# Patient Record
Sex: FEMALE | Race: WHITE | NOT HISPANIC OR LATINO | Employment: UNEMPLOYED | URBAN - METROPOLITAN AREA
[De-identification: names, ages, dates, MRNs, and addresses within clinical notes are randomized per-mention and may not be internally consistent; named-entity substitution may affect disease eponyms.]

---

## 2017-01-09 RX ORDER — DIGOXIN 125 MCG
125 TABLET ORAL
Qty: 90 TABLET | Refills: 0 | Status: SHIPPED | OUTPATIENT
Start: 2017-01-09 | End: 2017-04-19 | Stop reason: SDUPTHER

## 2017-01-10 RX ORDER — DIGOXIN 125 MCG
TABLET ORAL
Qty: 90 TABLET | Refills: 0 | OUTPATIENT
Start: 2017-01-10

## 2017-04-17 DIAGNOSIS — I48.0 PAF (PAROXYSMAL ATRIAL FIBRILLATION) (HCC): Primary | ICD-10-CM

## 2017-04-18 RX ORDER — DIGOXIN 125 MCG
TABLET ORAL
Qty: 90 TABLET | Refills: 0 | OUTPATIENT
Start: 2017-04-18

## 2017-04-18 NOTE — TELEPHONE ENCOUNTER
[facility] called about refilling pt's digoxin. I told the pharmacist the reason is she hasn't been seen since 2015 and she needed labs. She is going to deny it and let the pt know.

## 2017-04-19 RX ORDER — DIGOXIN 125 MCG
125 TABLET ORAL
Qty: 7 TABLET | Refills: 0 | Status: SHIPPED | OUTPATIENT
Start: 2017-04-19 | End: 2017-04-27

## 2017-04-27 ENCOUNTER — OFFICE VISIT (OUTPATIENT)
Dept: CARDIOLOGY | Facility: CLINIC | Age: 62
End: 2017-04-27

## 2017-04-27 VITALS
BODY MASS INDEX: 25.43 KG/M2 | DIASTOLIC BLOOD PRESSURE: 70 MMHG | HEART RATE: 62 BPM | WEIGHT: 162 LBS | HEIGHT: 67 IN | SYSTOLIC BLOOD PRESSURE: 120 MMHG

## 2017-04-27 DIAGNOSIS — I25.2 HISTORY OF MYOCARDIAL INFARCTION IN ADULTHOOD: Chronic | ICD-10-CM

## 2017-04-27 DIAGNOSIS — I48.20 CHRONIC ATRIAL FIBRILLATION (HCC): ICD-10-CM

## 2017-04-27 DIAGNOSIS — I27.20 PULMONARY HYPERTENSION (HCC): ICD-10-CM

## 2017-04-27 DIAGNOSIS — I10 ESSENTIAL HYPERTENSION: ICD-10-CM

## 2017-04-27 DIAGNOSIS — I25.10 CORONARY ARTERY DISEASE INVOLVING NATIVE CORONARY ARTERY OF NATIVE HEART WITHOUT ANGINA PECTORIS: Primary | Chronic | ICD-10-CM

## 2017-04-27 DIAGNOSIS — I50.32 CHRONIC DIASTOLIC HEART FAILURE (HCC): ICD-10-CM

## 2017-04-27 PROBLEM — I50.30 DIASTOLIC HEART FAILURE (HCC): Status: ACTIVE | Noted: 2017-04-27

## 2017-04-27 PROBLEM — Z72.0 TOBACCO ABUSE: Chronic | Status: ACTIVE | Noted: 2017-04-27

## 2017-04-27 PROBLEM — I48.91 ATRIAL FIBRILLATION (HCC): Status: ACTIVE | Noted: 2017-04-27

## 2017-04-27 PROBLEM — IMO0002 HISTORY OF RENAL STENT: Chronic | Status: ACTIVE | Noted: 2017-04-27

## 2017-04-27 PROBLEM — I67.1 NONRUPTURED CEREBRAL ANEURYSM: Status: ACTIVE | Noted: 2017-04-27

## 2017-04-27 PROCEDURE — 99213 OFFICE O/P EST LOW 20 MIN: CPT | Performed by: INTERNAL MEDICINE

## 2017-04-27 PROCEDURE — 93000 ELECTROCARDIOGRAM COMPLETE: CPT | Performed by: INTERNAL MEDICINE

## 2017-04-27 RX ORDER — HYDRALAZINE HYDROCHLORIDE 10 MG/1
10 TABLET, FILM COATED ORAL 3 TIMES DAILY
COMMUNITY
End: 2018-04-09 | Stop reason: HOSPADM

## 2017-04-27 RX ORDER — CARBAMAZEPINE 200 MG/1
200 TABLET ORAL 2 TIMES DAILY
COMMUNITY

## 2017-04-27 RX ORDER — ROSUVASTATIN CALCIUM 40 MG/1
40 TABLET, COATED ORAL NIGHTLY
COMMUNITY

## 2017-04-27 RX ORDER — AMLODIPINE BESYLATE 10 MG/1
10 TABLET ORAL DAILY
Status: ON HOLD | COMMUNITY
End: 2018-03-27

## 2017-04-27 RX ORDER — ESOMEPRAZOLE MAGNESIUM 40 MG/1
40 CAPSULE, DELAYED RELEASE ORAL
COMMUNITY

## 2017-04-27 RX ORDER — PRIMIDONE 250 MG/1
250 TABLET ORAL DAILY
COMMUNITY

## 2017-04-27 RX ORDER — ASPIRIN 81 MG/1
81 TABLET ORAL DAILY
COMMUNITY
End: 2018-04-09 | Stop reason: HOSPADM

## 2017-04-27 RX ORDER — ATENOLOL 50 MG/1
50 TABLET ORAL 2 TIMES DAILY
COMMUNITY
End: 2018-04-09 | Stop reason: HOSPADM

## 2017-04-27 NOTE — PROGRESS NOTES
Subjective:     Encounter Date:04/27/2017      Patient ID: Destiny Catherine is a 61 y.o. female.    Chief Complaint:  History of Present Illness    Dear Dr. Unger,    I had the pleasure of seeing this patient in the office today for follow-up of her cardiac status.  It's been a year and a half since her last visit.  She comes in simply because it was time for her routine follow-up.  She missed her one year appointment last October.    She denies any cardiac complaints.This patient denies any chest pain, pressure, tightness, squeezing, or heartburn.  This patient has not experienced any feeling of palpitations, tachycardia or heart racing and no presyncope or syncope.  There has not been any problems with dizziness or lightheadedness.  There has not been any orthopnea or PND, and no problems with lower extremity edema.  This patient denies any shortness of breath at rest or with activity and has not had any wheezing.  This patient has not had any problems with unexplained nausea or vomiting. The patient has continued to perform daily activities of living without any specific problem or change in the level of activity.  This patient has not been recently hospitalized for any reason.    She has a history of CAD and had a myocardial infarction in 2000. She had a catheter-based intervention at that time. That was before she was seen by Dr. Rausch and apparently he was never able to get the records. However, she states that since then she has not had any problems. She has not had any complaints of chest pain, pressure, tightness, squeezing, or heartburn. She has not been hospitalized or her heart for any reason. She has had 2 strokes in the past. She had a stroke in 2009 with distal beading on an angiogram at [facility] of the distal MCA and YOEL, then she had a cerebellar CVA in January 2010. Apparently on one of these to hospitalization she was started on Aggrenox and she has been on it ever since until it  was just recently stopped.. She had a cardiac evaluation at [facility] including a transesophageal echocardiogram that was normal. Normal LV systolic function and no cardiac source of emboli seen. Valvular function was normal.     The following portions of the patient's history were reviewed and updated as appropriate: allergies, current medications, past family history, past medical history, past social history, past surgical history and problem list.    Past Medical History:   Diagnosis Date   • Hyperlipidemia    • Hypertension    • Stroke        Past Surgical History:   Procedure Laterality Date   • APPENDECTOMY     • CHOLECYSTECTOMY     • CORONARY STENT PLACEMENT     • TOTAL HIP ARTHROPLASTY         Social History     Social History   • Marital status:      Spouse name: N/A   • Number of children: N/A   • Years of education: N/A     Occupational History   • Not on file.     Social History Main Topics   • Smoking status: Current Every Day Smoker     Types: Electronic Cigarette   • Smokeless tobacco: Not on file      Comment: caffine use   • Alcohol use No   • Drug use: No   • Sexual activity: Not on file     Other Topics Concern   • Not on file     Social History Narrative   • No narrative on file       Review of Systems   Constitution: Positive for weakness and malaise/fatigue. Negative for chills, decreased appetite, fever and night sweats.   HENT: Negative for ear discharge, ear pain, hearing loss, nosebleeds and sore throat.    Eyes: Negative for blurred vision, double vision and pain.   Cardiovascular: Negative for cyanosis.   Respiratory: Negative for hemoptysis and sputum production.    Endocrine: Negative for cold intolerance and heat intolerance.   Hematologic/Lymphatic: Negative for adenopathy.   Skin: Negative for dry skin, itching, nail changes, rash and suspicious lesions.   Musculoskeletal: Negative for arthritis, gout, muscle cramps, muscle weakness, myalgias and neck pain.  "  Gastrointestinal: Negative for anorexia, bowel incontinence, constipation, diarrhea, dysphagia, hematemesis and jaundice.   Genitourinary: Negative for bladder incontinence, dysuria, flank pain, frequency, hematuria and nocturia.   Neurological: Negative for focal weakness, numbness, paresthesias and seizures.   Psychiatric/Behavioral: Negative for altered mental status, hallucinations, hypervigilance, suicidal ideas and thoughts of violence.   Allergic/Immunologic: Negative for persistent infections.         ECG 12 Lead  Date/Time: 4/27/2017 1:55 PM  Performed by: ZURI FAY III  Authorized by: ZURI FAY III   Comparison: compared with previous ECG   Similar to previous ECG  Rhythm: atrial fibrillation  Rate: normal  Conduction: conduction normal  ST Segments: ST segments normal  T Waves: T waves normal  QRS axis: normal  Other: no other findings  Other findings: PRWP  Clinical impression: abnormal ECG               Objective:     Vitals:    04/27/17 1255   BP: 120/70   Pulse: 62   Weight: 162 lb (73.5 kg)   Height: 67\" (170.2 cm)         Physical Exam   Constitutional: She is oriented to person, place, and time. She appears well-developed and well-nourished. No distress.   HENT:   Head: Normocephalic and atraumatic.   Nose: Nose normal.   Mouth/Throat: Oropharynx is clear and moist.   Eyes: Conjunctivae and EOM are normal. Pupils are equal, round, and reactive to light. Right eye exhibits no discharge. Left eye exhibits no discharge.   Neck: Normal range of motion. Neck supple. No tracheal deviation present. No thyromegaly present.   Cardiovascular: Normal rate, S1 normal, S2 normal, normal heart sounds and normal pulses.  An irregularly irregular rhythm present. Exam reveals no S3.    Pulmonary/Chest: Effort normal and breath sounds normal. No stridor. No respiratory distress. She exhibits no tenderness.   Abdominal: Soft. Bowel sounds are normal. She exhibits no distension and no mass. There is no " tenderness. There is no rebound and no guarding.   Musculoskeletal: Normal range of motion. She exhibits no tenderness or deformity.   Lymphadenopathy:     She has no cervical adenopathy.   Neurological: She is alert and oriented to person, place, and time. She has normal reflexes.   Skin: Skin is warm and dry. No rash noted. She is not diaphoretic. No erythema.   Psychiatric: She has a normal mood and affect. Thought content normal.       Lab Review:             Performed        Assessment:          Diagnosis Plan   1. Coronary artery disease involving native coronary artery of native heart without angina pectoris  ECG 12 Lead   2. Chronic atrial fibrillation  ECG 12 Lead   3. Chronic diastolic heart failure  ECG 12 Lead   4. Essential hypertension     5. Pulmonary hypertension     6. History of myocardial infarction in adulthood  ECG 12 Lead          Plan:       1. Atrial Fibrillation and Atrial Flutter  Assessment  • The patient has permanent atrial fibrillation  • This is non-valvular in etiology  • The patient's CHADS2-VASc score is 5  • A [id number] score of 2 or more is considered a high risk for a thromboembolic event  • Dabigatran not prescribed for medical reasons  • Rivaroxaban not prescribed for medical reasons  • Apixaban not prescribed for medical reasons    Plan  • Continue in atrial fibrillation with rate control  • Continue aspirin for antithrombotic therapy, bleeding issues discussed  • Continue beta blocker and diltiazem for rate control    We will discontinue the digoxin.    2. Coronary Artery Disease  Assessment  • The patient has no angina    Plan  • Lifestyle modifications discussed include adhering to a heart healthy diet, avoidance of tobacco products, maintenance of a healthy weight, medication compliance, regular exercise and regular monitoring of cholesterol and blood pressure    Subjective - Objective  • There is a history of past MI  • Current antiplatelet therapy includes aspirin 81  mg and ticagrelor 90 mg      Thank you very much for allowing us to participate in the care of this pleasant patient.  Please don't hesitate to call if I can be of assistance in any way.      Current Outpatient Prescriptions:   •  amLODIPine (NORVASC) 10 MG tablet, Take 10 mg by mouth Daily., Disp: , Rfl:   •  aspirin 81 MG EC tablet, Take 81 mg by mouth Daily., Disp: , Rfl:   •  atenolol (TENORMIN) 50 MG tablet, Take 50 mg by mouth 3 (Three) Times a Day., Disp: , Rfl:   •  carBAMazepine (TEGretol) 200 MG tablet, Take 200 mg by mouth 4 (Four) Times a Day., Disp: , Rfl:   •  Ergocalciferol (VITAMIN D2 PO), Take  by mouth., Disp: , Rfl:   •  esomeprazole (nexIUM) 40 MG capsule, Take 40 mg by mouth Every Morning Before Breakfast., Disp: , Rfl:   •  hydrALAZINE (APRESOLINE) 10 MG tablet, Take 10 mg by mouth Daily., Disp: , Rfl:   •  primidone (MYSOLINE) 250 MG tablet, Take 250 mg by mouth 3 (Three) Times a Day., Disp: , Rfl:   •  rosuvastatin (CRESTOR) 40 MG tablet, Take 40 mg by mouth Daily., Disp: , Rfl:   •  ticagrelor (BRILINTA) 90 MG tablet tablet, Take 90 mg by mouth 2 (Two) Times a Day., Disp: , Rfl:          EMR Dragon/Transcription disclaimer:    Much of this encounter note is an electronic transcription/translation of spoken language to printed text. The electronic translation of spoken language may permit erroneous, or at times, nonsensical words or phrases to be inadvertently transcribed; Although I have reviewed the note for such errors, some may still exist.

## 2018-03-27 ENCOUNTER — HOSPITAL ENCOUNTER (INPATIENT)
Facility: HOSPITAL | Age: 63
LOS: 3 days | Discharge: HOME HEALTH | End: 2018-03-30
Attending: INTERNAL MEDICINE | Admitting: INTERNAL MEDICINE

## 2018-03-27 ENCOUNTER — APPOINTMENT (OUTPATIENT)
Dept: GENERAL RADIOLOGY | Facility: HOSPITAL | Age: 63
End: 2018-03-27

## 2018-03-27 DIAGNOSIS — I50.33 ACUTE ON CHRONIC DIASTOLIC HEART FAILURE (HCC): Primary | ICD-10-CM

## 2018-03-27 DIAGNOSIS — J96.21 ACUTE ON CHRONIC RESPIRATORY FAILURE WITH HYPOXIA (HCC): ICD-10-CM

## 2018-03-27 DIAGNOSIS — I27.20 PULMONARY HYPERTENSION (HCC): ICD-10-CM

## 2018-03-27 DIAGNOSIS — R26.2 DIFFICULTY WALKING: ICD-10-CM

## 2018-03-27 PROBLEM — G40.909 EPILEPSY (HCC): Status: ACTIVE | Noted: 2018-03-27

## 2018-03-27 LAB — TROPONIN T SERPL-MCNC: <0.01 NG/ML (ref 0–0.03)

## 2018-03-27 PROCEDURE — 71045 X-RAY EXAM CHEST 1 VIEW: CPT

## 2018-03-27 PROCEDURE — 25010000002 FUROSEMIDE PER 20 MG: Performed by: INTERNAL MEDICINE

## 2018-03-27 PROCEDURE — 93005 ELECTROCARDIOGRAM TRACING: CPT | Performed by: INTERNAL MEDICINE

## 2018-03-27 PROCEDURE — 25010000002 ENOXAPARIN PER 10 MG: Performed by: INTERNAL MEDICINE

## 2018-03-27 PROCEDURE — 93010 ELECTROCARDIOGRAM REPORT: CPT | Performed by: INTERNAL MEDICINE

## 2018-03-27 PROCEDURE — 84484 ASSAY OF TROPONIN QUANT: CPT | Performed by: INTERNAL MEDICINE

## 2018-03-27 RX ORDER — HYDRALAZINE HYDROCHLORIDE 10 MG/1
10 TABLET, FILM COATED ORAL 3 TIMES DAILY
Status: DISCONTINUED | OUTPATIENT
Start: 2018-03-27 | End: 2018-03-30 | Stop reason: HOSPADM

## 2018-03-27 RX ORDER — FUROSEMIDE 10 MG/ML
40 INJECTION INTRAMUSCULAR; INTRAVENOUS EVERY 12 HOURS
Status: DISCONTINUED | OUTPATIENT
Start: 2018-03-27 | End: 2018-03-29

## 2018-03-27 RX ORDER — CARBAMAZEPINE 200 MG/1
200 TABLET ORAL 2 TIMES DAILY
Status: DISCONTINUED | OUTPATIENT
Start: 2018-03-27 | End: 2018-03-30 | Stop reason: HOSPADM

## 2018-03-27 RX ORDER — AMLODIPINE BESYLATE 10 MG/1
10 TABLET ORAL DAILY
Status: ON HOLD | COMMUNITY
End: 2019-05-08

## 2018-03-27 RX ORDER — PANTOPRAZOLE SODIUM 40 MG/1
40 TABLET, DELAYED RELEASE ORAL EVERY MORNING
Status: DISCONTINUED | OUTPATIENT
Start: 2018-03-28 | End: 2018-03-30 | Stop reason: HOSPADM

## 2018-03-27 RX ORDER — ATENOLOL 50 MG/1
50 TABLET ORAL 2 TIMES DAILY
Status: DISCONTINUED | OUTPATIENT
Start: 2018-03-27 | End: 2018-03-30 | Stop reason: HOSPADM

## 2018-03-27 RX ORDER — ALBUTEROL SULFATE 2.5 MG/3ML
2.5 SOLUTION RESPIRATORY (INHALATION) EVERY 6 HOURS PRN
Status: DISCONTINUED | OUTPATIENT
Start: 2018-03-27 | End: 2018-03-30 | Stop reason: HOSPADM

## 2018-03-27 RX ORDER — ACETAMINOPHEN 325 MG/1
650 TABLET ORAL EVERY 4 HOURS PRN
Status: DISCONTINUED | OUTPATIENT
Start: 2018-03-27 | End: 2018-03-30 | Stop reason: HOSPADM

## 2018-03-27 RX ORDER — ROSUVASTATIN CALCIUM 40 MG/1
40 TABLET, COATED ORAL NIGHTLY
Status: DISCONTINUED | OUTPATIENT
Start: 2018-03-27 | End: 2018-03-30 | Stop reason: HOSPADM

## 2018-03-27 RX ORDER — AMLODIPINE BESYLATE 10 MG/1
10 TABLET ORAL DAILY
Status: DISCONTINUED | OUTPATIENT
Start: 2018-03-28 | End: 2018-03-30 | Stop reason: HOSPADM

## 2018-03-27 RX ORDER — PRIMIDONE 250 MG/1
250 TABLET ORAL DAILY
Status: DISCONTINUED | OUTPATIENT
Start: 2018-03-28 | End: 2018-03-30 | Stop reason: HOSPADM

## 2018-03-27 RX ORDER — SODIUM CHLORIDE 0.9 % (FLUSH) 0.9 %
1-10 SYRINGE (ML) INJECTION AS NEEDED
Status: DISCONTINUED | OUTPATIENT
Start: 2018-03-27 | End: 2018-03-30 | Stop reason: HOSPADM

## 2018-03-27 RX ADMIN — FUROSEMIDE 40 MG: 10 INJECTION, SOLUTION INTRAMUSCULAR; INTRAVENOUS at 23:09

## 2018-03-27 RX ADMIN — ROSUVASTATIN CALCIUM 40 MG: 40 TABLET, FILM COATED ORAL at 23:09

## 2018-03-27 RX ADMIN — TICAGRELOR 90 MG: 90 TABLET ORAL at 23:09

## 2018-03-27 RX ADMIN — ATENOLOL 50 MG: 50 TABLET ORAL at 23:09

## 2018-03-27 RX ADMIN — ENOXAPARIN SODIUM 40 MG: 40 INJECTION SUBCUTANEOUS at 23:09

## 2018-03-27 RX ADMIN — HYDRALAZINE HYDROCHLORIDE 10 MG: 10 TABLET, FILM COATED ORAL at 23:09

## 2018-03-27 RX ADMIN — CARBAMAZEPINE 200 MG: 200 TABLET ORAL at 23:09

## 2018-03-28 ENCOUNTER — APPOINTMENT (OUTPATIENT)
Dept: CARDIOLOGY | Facility: HOSPITAL | Age: 63
End: 2018-03-28
Attending: INTERNAL MEDICINE

## 2018-03-28 PROBLEM — D72.829 LEUKOCYTOSIS: Status: ACTIVE | Noted: 2018-03-28

## 2018-03-28 PROBLEM — N39.0 UTI (URINARY TRACT INFECTION): Status: ACTIVE | Noted: 2018-03-28

## 2018-03-28 PROBLEM — I10 HTN (HYPERTENSION): Chronic | Status: ACTIVE | Noted: 2018-03-28

## 2018-03-28 PROBLEM — J96.01 ACUTE RESPIRATORY FAILURE WITH HYPOXIA (HCC): Status: ACTIVE | Noted: 2018-03-28

## 2018-03-28 LAB
ANION GAP SERPL CALCULATED.3IONS-SCNC: 14.8 MMOL/L
ASCENDING AORTA: 2.9 CM
BACTERIA UR QL AUTO: ABNORMAL /HPF
BASOPHILS # BLD AUTO: 0.02 10*3/MM3 (ref 0–0.2)
BASOPHILS NFR BLD AUTO: 0.2 % (ref 0–1.5)
BH CV ECHO MEAS - AO MAX PG: 22 MMHG
BH CV ECHO MEAS - AO MEAN PG (FULL): 2 MMHG
BH CV ECHO MEAS - AO MEAN PG: 5 MMHG
BH CV ECHO MEAS - AO V2 MAX: 116 CM/SEC
BH CV ECHO MEAS - AO V2 MEAN: 109 CM/SEC
BH CV ECHO MEAS - AO V2 VTI: 33.1 CM
BH CV ECHO MEAS - AVA(I,A): 2.1 CM^2
BH CV ECHO MEAS - AVA(I,D): 2.1 CM^2
BH CV ECHO MEAS - BSA(HAYCOCK): 1.8 M^2
BH CV ECHO MEAS - BSA: 1.8 M^2
BH CV ECHO MEAS - BZI_BMI: 24.1 KILOGRAMS/M^2
BH CV ECHO MEAS - BZI_METRIC_HEIGHT: 170.2 CM
BH CV ECHO MEAS - BZI_METRIC_WEIGHT: 69.9 KG
BH CV ECHO MEAS - CONTRAST EF (2CH): 56.4 ML/M^2
BH CV ECHO MEAS - CONTRAST EF 4CH: 58 ML/M^2
BH CV ECHO MEAS - EDV(MOD-SP2): 55 ML
BH CV ECHO MEAS - EDV(MOD-SP4): 50 ML
BH CV ECHO MEAS - EDV(TEICH): 102.4 ML
BH CV ECHO MEAS - EF(CUBED): 65.4 %
BH CV ECHO MEAS - EF(MOD-SP2): 56.4 %
BH CV ECHO MEAS - EF(MOD-SP4): 57 %
BH CV ECHO MEAS - EF(TEICH): 56.9 %
BH CV ECHO MEAS - ESV(MOD-SP2): 24 ML
BH CV ECHO MEAS - ESV(MOD-SP4): 21 ML
BH CV ECHO MEAS - ESV(TEICH): 44.1 ML
BH CV ECHO MEAS - FS: 29.8 %
BH CV ECHO MEAS - IVS/LVPW: 1
BH CV ECHO MEAS - IVSD: 1 CM
BH CV ECHO MEAS - LAT PEAK E' VEL: 12 CM/SEC
BH CV ECHO MEAS - LV DIASTOLIC VOL/BSA (35-75): 27.6 ML/M^2
BH CV ECHO MEAS - LV MASS(C)D: 164.5 GRAMS
BH CV ECHO MEAS - LV MASS(C)DI: 90.9 GRAMS/M^2
BH CV ECHO MEAS - LV MAX PG: 33 MMHG
BH CV ECHO MEAS - LV MEAN PG: 3 MMHG
BH CV ECHO MEAS - LV SYSTOLIC VOL/BSA (12-30): 11.6 ML/M^2
BH CV ECHO MEAS - LV V1 MAX: 154 CM/SEC
BH CV ECHO MEAS - LV V1 MEAN: 86.3 CM/SEC
BH CV ECHO MEAS - LV V1 VTI: 22.4 CM
BH CV ECHO MEAS - LVIDD: 4.7 CM
BH CV ECHO MEAS - LVIDS: 3.3 CM
BH CV ECHO MEAS - LVLD AP2: 5.6 CM
BH CV ECHO MEAS - LVLD AP4: 6.7 CM
BH CV ECHO MEAS - LVLS AP2: 5 CM
BH CV ECHO MEAS - LVLS AP4: 5.8 CM
BH CV ECHO MEAS - LVOT AREA (M): 3.1 CM^2
BH CV ECHO MEAS - LVOT AREA: 3.1 CM^2
BH CV ECHO MEAS - LVOT DIAM: 2 CM
BH CV ECHO MEAS - LVPWD: 1 CM
BH CV ECHO MEAS - MED PEAK E' VEL: 16 CM/SEC
BH CV ECHO MEAS - MR MAX PG: 82.4 MMHG
BH CV ECHO MEAS - MR MAX VEL: 454 CM/SEC
BH CV ECHO MEAS - MV DEC SLOPE: 725 CM/SEC^2
BH CV ECHO MEAS - MV DEC TIME: 0.17 SEC
BH CV ECHO MEAS - MV E MAX VEL: 112 CM/SEC
BH CV ECHO MEAS - MV MEAN PG: 2 MMHG
BH CV ECHO MEAS - MV P1/2T MAX VEL: 115 CM/SEC
BH CV ECHO MEAS - MV P1/2T: 46.5 MSEC
BH CV ECHO MEAS - MV V2 MEAN: 61.7 CM/SEC
BH CV ECHO MEAS - MV V2 VTI: 18.8 CM
BH CV ECHO MEAS - MVA P1/2T LCG: 1.9 CM^2
BH CV ECHO MEAS - MVA(P1/2T): 4.7 CM^2
BH CV ECHO MEAS - MVA(VTI): 3.7 CM^2
BH CV ECHO MEAS - PA MAX PG (FULL): 2.3 MMHG
BH CV ECHO MEAS - PA MAX PG: 4.8 MMHG
BH CV ECHO MEAS - PA V2 MAX: 110 CM/SEC
BH CV ECHO MEAS - PULM A REVS DUR: 0.14 SEC
BH CV ECHO MEAS - PULM A REVS VEL: 32.9 CM/SEC
BH CV ECHO MEAS - PULM DIAS VEL: 51.1 CM/SEC
BH CV ECHO MEAS - PULM S/D: 0.86
BH CV ECHO MEAS - PULM SYS VEL: 44.2 CM/SEC
BH CV ECHO MEAS - PVA(V,A): 2 CM^2
BH CV ECHO MEAS - PVA(V,D): 2 CM^2
BH CV ECHO MEAS - QP/QS: 0.53
BH CV ECHO MEAS - RAP SYSTOLE: 8 MMHG
BH CV ECHO MEAS - RV MAX PG: 2.5 MMHG
BH CV ECHO MEAS - RV MEAN PG: 1 MMHG
BH CV ECHO MEAS - RV V1 MAX: 79 CM/SEC
BH CV ECHO MEAS - RV V1 MEAN: 43.7 CM/SEC
BH CV ECHO MEAS - RV V1 VTI: 13.2 CM
BH CV ECHO MEAS - RVOT AREA: 2.8 CM^2
BH CV ECHO MEAS - RVOT DIAM: 1.9 CM
BH CV ECHO MEAS - RVSP: 73 MMHG
BH CV ECHO MEAS - SI(CUBED): 37.5 ML/M^2
BH CV ECHO MEAS - SI(LVOT): 38.9 ML/M^2
BH CV ECHO MEAS - SI(MOD-SP2): 17.1 ML/M^2
BH CV ECHO MEAS - SI(MOD-SP4): 16 ML/M^2
BH CV ECHO MEAS - SI(TEICH): 32.2 ML/M^2
BH CV ECHO MEAS - SUP REN AO DIAM: 1.9 CM
BH CV ECHO MEAS - SV(CUBED): 67.9 ML
BH CV ECHO MEAS - SV(LVOT): 70.4 ML
BH CV ECHO MEAS - SV(MOD-SP2): 31 ML
BH CV ECHO MEAS - SV(MOD-SP4): 29 ML
BH CV ECHO MEAS - SV(RVOT): 37.4 ML
BH CV ECHO MEAS - SV(TEICH): 58.2 ML
BH CV ECHO MEAS - TAPSE (>1.6): 1.2 CM2
BH CV ECHO MEAS - TR MAX VEL: 404 CM/SEC
BH CV XLRA - RV BASE: 3.4 CM
BH CV XLRA - TDI S': 7 CM/SEC
BILIRUB UR QL STRIP: NEGATIVE
BUN BLD-MCNC: 20 MG/DL (ref 8–23)
BUN/CREAT SERPL: 19.2 (ref 7–25)
CALCIUM SPEC-SCNC: 8.9 MG/DL (ref 8.6–10.5)
CHLORIDE SERPL-SCNC: 100 MMOL/L (ref 98–107)
CLARITY UR: ABNORMAL
CO2 SERPL-SCNC: 23.2 MMOL/L (ref 22–29)
COLOR UR: YELLOW
CREAT BLD-MCNC: 1.04 MG/DL (ref 0.57–1)
DEPRECATED RDW RBC AUTO: 47 FL (ref 37–54)
E/E' RATIO: 14
EOSINOPHIL # BLD AUTO: 0.02 10*3/MM3 (ref 0–0.7)
EOSINOPHIL NFR BLD AUTO: 0.2 % (ref 0.3–6.2)
ERYTHROCYTE [DISTWIDTH] IN BLOOD BY AUTOMATED COUNT: 14.6 % (ref 11.7–13)
GFR SERPL CREATININE-BSD FRML MDRD: 54 ML/MIN/1.73
GLUCOSE BLD-MCNC: 100 MG/DL (ref 65–99)
GLUCOSE UR STRIP-MCNC: NEGATIVE MG/DL
HBA1C MFR BLD: 4.8 % (ref 4.8–5.6)
HCT VFR BLD AUTO: 31.2 % (ref 35.6–45.5)
HGB BLD-MCNC: 9.7 G/DL (ref 11.9–15.5)
HGB UR QL STRIP.AUTO: NEGATIVE
HYALINE CASTS UR QL AUTO: ABNORMAL /LPF
IMM GRANULOCYTES # BLD: 0.03 10*3/MM3 (ref 0–0.03)
IMM GRANULOCYTES NFR BLD: 0.2 % (ref 0–0.5)
KETONES UR QL STRIP: NEGATIVE
LEFT ATRIUM VOLUME INDEX: 47 ML/M2
LEUKOCYTE ESTERASE UR QL STRIP.AUTO: ABNORMAL
LV EF 2D ECHO EST: 57 %
LYMPHOCYTES # BLD AUTO: 1.2 10*3/MM3 (ref 0.9–4.8)
LYMPHOCYTES NFR BLD AUTO: 9.5 % (ref 19.6–45.3)
MCH RBC QN AUTO: 27.4 PG (ref 26.9–32)
MCHC RBC AUTO-ENTMCNC: 31.1 G/DL (ref 32.4–36.3)
MCV RBC AUTO: 88.1 FL (ref 80.5–98.2)
MONOCYTES # BLD AUTO: 1.1 10*3/MM3 (ref 0.2–1.2)
MONOCYTES NFR BLD AUTO: 8.7 % (ref 5–12)
NEUTROPHILS # BLD AUTO: 10.25 10*3/MM3 (ref 1.9–8.1)
NEUTROPHILS NFR BLD AUTO: 81.2 % (ref 42.7–76)
NITRITE UR QL STRIP: POSITIVE
PH UR STRIP.AUTO: 5.5 [PH] (ref 5–8)
PLATELET # BLD AUTO: 175 10*3/MM3 (ref 140–500)
PMV BLD AUTO: 10.7 FL (ref 6–12)
POTASSIUM BLD-SCNC: 4.1 MMOL/L (ref 3.5–5.2)
PROT UR QL STRIP: NEGATIVE
RBC # BLD AUTO: 3.54 10*6/MM3 (ref 3.9–5.2)
RBC # UR: ABNORMAL /HPF
REF LAB TEST METHOD: ABNORMAL
SINUS: 3 CM
SODIUM BLD-SCNC: 138 MMOL/L (ref 136–145)
SP GR UR STRIP: 1.01 (ref 1–1.03)
SQUAMOUS #/AREA URNS HPF: ABNORMAL /HPF
STJ: 2.6 CM
UROBILINOGEN UR QL STRIP: ABNORMAL
WBC NRBC COR # BLD: 12.62 10*3/MM3 (ref 4.5–10.7)
WBC UR QL AUTO: ABNORMAL /HPF

## 2018-03-28 PROCEDURE — 94799 UNLISTED PULMONARY SVC/PX: CPT

## 2018-03-28 PROCEDURE — 80048 BASIC METABOLIC PNL TOTAL CA: CPT | Performed by: INTERNAL MEDICINE

## 2018-03-28 PROCEDURE — 81001 URINALYSIS AUTO W/SCOPE: CPT | Performed by: HOSPITALIST

## 2018-03-28 PROCEDURE — 93306 TTE W/DOPPLER COMPLETE: CPT | Performed by: INTERNAL MEDICINE

## 2018-03-28 PROCEDURE — 99252 IP/OBS CONSLTJ NEW/EST SF 35: CPT | Performed by: INTERNAL MEDICINE

## 2018-03-28 PROCEDURE — 83036 HEMOGLOBIN GLYCOSYLATED A1C: CPT | Performed by: INTERNAL MEDICINE

## 2018-03-28 PROCEDURE — 94640 AIRWAY INHALATION TREATMENT: CPT

## 2018-03-28 PROCEDURE — 93306 TTE W/DOPPLER COMPLETE: CPT

## 2018-03-28 PROCEDURE — 25010000002 ENOXAPARIN PER 10 MG: Performed by: INTERNAL MEDICINE

## 2018-03-28 PROCEDURE — 87086 URINE CULTURE/COLONY COUNT: CPT | Performed by: HOSPITALIST

## 2018-03-28 PROCEDURE — 25010000002 FUROSEMIDE PER 20 MG: Performed by: INTERNAL MEDICINE

## 2018-03-28 PROCEDURE — 85025 COMPLETE CBC W/AUTO DIFF WBC: CPT | Performed by: INTERNAL MEDICINE

## 2018-03-28 PROCEDURE — 25010000002 CEFTRIAXONE PER 250 MG: Performed by: HOSPITALIST

## 2018-03-28 PROCEDURE — 87186 SC STD MICRODIL/AGAR DIL: CPT | Performed by: HOSPITALIST

## 2018-03-28 RX ORDER — IPRATROPIUM BROMIDE AND ALBUTEROL SULFATE 2.5; .5 MG/3ML; MG/3ML
3 SOLUTION RESPIRATORY (INHALATION)
Status: DISCONTINUED | OUTPATIENT
Start: 2018-03-28 | End: 2018-03-30 | Stop reason: HOSPADM

## 2018-03-28 RX ORDER — CEFTRIAXONE SODIUM 1 G/50ML
1 INJECTION, SOLUTION INTRAVENOUS EVERY 24 HOURS
Status: DISCONTINUED | OUTPATIENT
Start: 2018-03-28 | End: 2018-03-30

## 2018-03-28 RX ADMIN — ENOXAPARIN SODIUM 40 MG: 40 INJECTION SUBCUTANEOUS at 22:19

## 2018-03-28 RX ADMIN — FUROSEMIDE 40 MG: 10 INJECTION, SOLUTION INTRAMUSCULAR; INTRAVENOUS at 10:04

## 2018-03-28 RX ADMIN — HYDRALAZINE HYDROCHLORIDE 10 MG: 10 TABLET, FILM COATED ORAL at 20:51

## 2018-03-28 RX ADMIN — CARBAMAZEPINE 200 MG: 200 TABLET ORAL at 20:49

## 2018-03-28 RX ADMIN — ATENOLOL 50 MG: 50 TABLET ORAL at 20:49

## 2018-03-28 RX ADMIN — ATENOLOL 50 MG: 50 TABLET ORAL at 10:04

## 2018-03-28 RX ADMIN — TICAGRELOR 90 MG: 90 TABLET ORAL at 10:04

## 2018-03-28 RX ADMIN — HYDRALAZINE HYDROCHLORIDE 10 MG: 10 TABLET, FILM COATED ORAL at 10:04

## 2018-03-28 RX ADMIN — TICAGRELOR 90 MG: 90 TABLET ORAL at 20:49

## 2018-03-28 RX ADMIN — PRIMIDONE 250 MG: 250 TABLET ORAL at 10:04

## 2018-03-28 RX ADMIN — CEFTRIAXONE SODIUM 1 G: 1 INJECTION, SOLUTION INTRAVENOUS at 16:41

## 2018-03-28 RX ADMIN — IPRATROPIUM BROMIDE AND ALBUTEROL SULFATE 3 ML: .5; 3 SOLUTION RESPIRATORY (INHALATION) at 20:09

## 2018-03-28 RX ADMIN — IPRATROPIUM BROMIDE AND ALBUTEROL SULFATE 3 ML: .5; 3 SOLUTION RESPIRATORY (INHALATION) at 15:50

## 2018-03-28 RX ADMIN — ROSUVASTATIN CALCIUM 40 MG: 40 TABLET, FILM COATED ORAL at 20:49

## 2018-03-28 RX ADMIN — AMLODIPINE BESYLATE 10 MG: 10 TABLET ORAL at 10:04

## 2018-03-28 RX ADMIN — HYDRALAZINE HYDROCHLORIDE 10 MG: 10 TABLET, FILM COATED ORAL at 16:46

## 2018-03-28 RX ADMIN — PANTOPRAZOLE SODIUM 40 MG: 40 TABLET, DELAYED RELEASE ORAL at 06:36

## 2018-03-28 RX ADMIN — CARBAMAZEPINE 200 MG: 200 TABLET ORAL at 10:04

## 2018-03-29 LAB
ANION GAP SERPL CALCULATED.3IONS-SCNC: 14.3 MMOL/L
BUN BLD-MCNC: 21 MG/DL (ref 8–23)
BUN/CREAT SERPL: 20.8 (ref 7–25)
CALCIUM SPEC-SCNC: 8.2 MG/DL (ref 8.6–10.5)
CHLORIDE SERPL-SCNC: 93 MMOL/L (ref 98–107)
CO2 SERPL-SCNC: 24.7 MMOL/L (ref 22–29)
CREAT BLD-MCNC: 1.01 MG/DL (ref 0.57–1)
DEPRECATED RDW RBC AUTO: 47.7 FL (ref 37–54)
ERYTHROCYTE [DISTWIDTH] IN BLOOD BY AUTOMATED COUNT: 14.7 % (ref 11.7–13)
GFR SERPL CREATININE-BSD FRML MDRD: 56 ML/MIN/1.73
GLUCOSE BLD-MCNC: 106 MG/DL (ref 65–99)
HCT VFR BLD AUTO: 30.9 % (ref 35.6–45.5)
HGB BLD-MCNC: 9.4 G/DL (ref 11.9–15.5)
MCH RBC QN AUTO: 27.1 PG (ref 26.9–32)
MCHC RBC AUTO-ENTMCNC: 30.4 G/DL (ref 32.4–36.3)
MCV RBC AUTO: 89 FL (ref 80.5–98.2)
NT-PROBNP SERPL-MCNC: 2734 PG/ML (ref 0–900)
PLATELET # BLD AUTO: 168 10*3/MM3 (ref 140–500)
PMV BLD AUTO: 10.8 FL (ref 6–12)
POTASSIUM BLD-SCNC: 3.5 MMOL/L (ref 3.5–5.2)
RBC # BLD AUTO: 3.47 10*6/MM3 (ref 3.9–5.2)
SODIUM BLD-SCNC: 132 MMOL/L (ref 136–145)
WBC NRBC COR # BLD: 8.69 10*3/MM3 (ref 4.5–10.7)

## 2018-03-29 PROCEDURE — 80048 BASIC METABOLIC PNL TOTAL CA: CPT | Performed by: INTERNAL MEDICINE

## 2018-03-29 PROCEDURE — 99233 SBSQ HOSP IP/OBS HIGH 50: CPT | Performed by: INTERNAL MEDICINE

## 2018-03-29 PROCEDURE — 25010000002 CEFTRIAXONE PER 250 MG: Performed by: HOSPITALIST

## 2018-03-29 PROCEDURE — 25010000002 ENOXAPARIN PER 10 MG: Performed by: INTERNAL MEDICINE

## 2018-03-29 PROCEDURE — 94799 UNLISTED PULMONARY SVC/PX: CPT

## 2018-03-29 PROCEDURE — 85027 COMPLETE CBC AUTOMATED: CPT | Performed by: HOSPITALIST

## 2018-03-29 PROCEDURE — 83880 ASSAY OF NATRIURETIC PEPTIDE: CPT | Performed by: HOSPITALIST

## 2018-03-29 RX ORDER — FUROSEMIDE 40 MG/1
40 TABLET ORAL
Status: DISCONTINUED | OUTPATIENT
Start: 2018-03-29 | End: 2018-03-30 | Stop reason: HOSPADM

## 2018-03-29 RX ADMIN — IPRATROPIUM BROMIDE AND ALBUTEROL SULFATE 3 ML: .5; 3 SOLUTION RESPIRATORY (INHALATION) at 07:29

## 2018-03-29 RX ADMIN — ROSUVASTATIN CALCIUM 40 MG: 40 TABLET, FILM COATED ORAL at 21:45

## 2018-03-29 RX ADMIN — PANTOPRAZOLE SODIUM 40 MG: 40 TABLET, DELAYED RELEASE ORAL at 06:14

## 2018-03-29 RX ADMIN — ATENOLOL 50 MG: 50 TABLET ORAL at 09:58

## 2018-03-29 RX ADMIN — FUROSEMIDE 40 MG: 40 TABLET ORAL at 17:10

## 2018-03-29 RX ADMIN — TICAGRELOR 90 MG: 90 TABLET ORAL at 09:58

## 2018-03-29 RX ADMIN — IPRATROPIUM BROMIDE AND ALBUTEROL SULFATE 3 ML: .5; 3 SOLUTION RESPIRATORY (INHALATION) at 19:53

## 2018-03-29 RX ADMIN — CEFTRIAXONE SODIUM 1 G: 1 INJECTION, SOLUTION INTRAVENOUS at 15:23

## 2018-03-29 RX ADMIN — FUROSEMIDE 40 MG: 40 TABLET ORAL at 09:59

## 2018-03-29 RX ADMIN — ENOXAPARIN SODIUM 40 MG: 40 INJECTION SUBCUTANEOUS at 21:45

## 2018-03-29 RX ADMIN — CARBAMAZEPINE 200 MG: 200 TABLET ORAL at 09:59

## 2018-03-29 RX ADMIN — CARBAMAZEPINE 200 MG: 200 TABLET ORAL at 21:45

## 2018-03-29 RX ADMIN — IPRATROPIUM BROMIDE AND ALBUTEROL SULFATE 3 ML: .5; 3 SOLUTION RESPIRATORY (INHALATION) at 10:35

## 2018-03-29 RX ADMIN — AMLODIPINE BESYLATE 10 MG: 10 TABLET ORAL at 09:58

## 2018-03-29 RX ADMIN — TICAGRELOR 90 MG: 90 TABLET ORAL at 21:45

## 2018-03-29 RX ADMIN — IPRATROPIUM BROMIDE AND ALBUTEROL SULFATE 3 ML: .5; 3 SOLUTION RESPIRATORY (INHALATION) at 15:04

## 2018-03-29 RX ADMIN — HYDRALAZINE HYDROCHLORIDE 10 MG: 10 TABLET, FILM COATED ORAL at 21:45

## 2018-03-29 RX ADMIN — ATENOLOL 50 MG: 50 TABLET ORAL at 21:45

## 2018-03-29 RX ADMIN — PRIMIDONE 250 MG: 250 TABLET ORAL at 09:58

## 2018-03-29 RX ADMIN — HYDRALAZINE HYDROCHLORIDE 10 MG: 10 TABLET, FILM COATED ORAL at 15:23

## 2018-03-29 RX ADMIN — HYDRALAZINE HYDROCHLORIDE 10 MG: 10 TABLET, FILM COATED ORAL at 09:58

## 2018-03-30 VITALS
HEIGHT: 67 IN | DIASTOLIC BLOOD PRESSURE: 64 MMHG | TEMPERATURE: 97.7 F | HEART RATE: 76 BPM | SYSTOLIC BLOOD PRESSURE: 99 MMHG | WEIGHT: 293 LBS | OXYGEN SATURATION: 96 % | RESPIRATION RATE: 20 BRPM | BODY MASS INDEX: 45.99 KG/M2

## 2018-03-30 PROBLEM — D64.9 ANEMIA: Status: ACTIVE | Noted: 2018-03-30

## 2018-03-30 PROBLEM — J96.21 ACUTE ON CHRONIC RESPIRATORY FAILURE WITH HYPOXIA (HCC): Status: ACTIVE | Noted: 2018-03-28

## 2018-03-30 LAB
ANION GAP SERPL CALCULATED.3IONS-SCNC: 13.6 MMOL/L
BACTERIA SPEC AEROBE CULT: ABNORMAL
BACTERIA SPEC AEROBE CULT: ABNORMAL
BUN BLD-MCNC: 21 MG/DL (ref 8–23)
BUN/CREAT SERPL: 22.3 (ref 7–25)
CALCIUM SPEC-SCNC: 8.6 MG/DL (ref 8.6–10.5)
CHLORIDE SERPL-SCNC: 91 MMOL/L (ref 98–107)
CO2 SERPL-SCNC: 27.4 MMOL/L (ref 22–29)
CREAT BLD-MCNC: 0.94 MG/DL (ref 0.57–1)
DEPRECATED RDW RBC AUTO: 46.7 FL (ref 37–54)
ERYTHROCYTE [DISTWIDTH] IN BLOOD BY AUTOMATED COUNT: 14.5 % (ref 11.7–13)
GFR SERPL CREATININE-BSD FRML MDRD: 60 ML/MIN/1.73
GLUCOSE BLD-MCNC: 89 MG/DL (ref 65–99)
HCT VFR BLD AUTO: 29.8 % (ref 35.6–45.5)
HGB BLD-MCNC: 9.3 G/DL (ref 11.9–15.5)
MCH RBC QN AUTO: 27.4 PG (ref 26.9–32)
MCHC RBC AUTO-ENTMCNC: 31.2 G/DL (ref 32.4–36.3)
MCV RBC AUTO: 87.6 FL (ref 80.5–98.2)
NT-PROBNP SERPL-MCNC: 2125 PG/ML (ref 0–900)
PLATELET # BLD AUTO: 166 10*3/MM3 (ref 140–500)
PMV BLD AUTO: 11.1 FL (ref 6–12)
POTASSIUM BLD-SCNC: 4 MMOL/L (ref 3.5–5.2)
RBC # BLD AUTO: 3.4 10*6/MM3 (ref 3.9–5.2)
SODIUM BLD-SCNC: 132 MMOL/L (ref 136–145)
WBC NRBC COR # BLD: 7.43 10*3/MM3 (ref 4.5–10.7)

## 2018-03-30 PROCEDURE — 80048 BASIC METABOLIC PNL TOTAL CA: CPT | Performed by: INTERNAL MEDICINE

## 2018-03-30 PROCEDURE — 97110 THERAPEUTIC EXERCISES: CPT

## 2018-03-30 PROCEDURE — 97162 PT EVAL MOD COMPLEX 30 MIN: CPT

## 2018-03-30 PROCEDURE — 83880 ASSAY OF NATRIURETIC PEPTIDE: CPT | Performed by: HOSPITALIST

## 2018-03-30 PROCEDURE — 99232 SBSQ HOSP IP/OBS MODERATE 35: CPT | Performed by: INTERNAL MEDICINE

## 2018-03-30 PROCEDURE — 85027 COMPLETE CBC AUTOMATED: CPT | Performed by: HOSPITALIST

## 2018-03-30 PROCEDURE — 94799 UNLISTED PULMONARY SVC/PX: CPT

## 2018-03-30 RX ORDER — FUROSEMIDE 40 MG/1
40 TABLET ORAL DAILY
Qty: 30 TABLET | Refills: 0 | Status: SHIPPED | OUTPATIENT
Start: 2018-03-30 | End: 2019-12-18 | Stop reason: HOSPADM

## 2018-03-30 RX ORDER — CEPHALEXIN 500 MG/1
500 CAPSULE ORAL EVERY 8 HOURS SCHEDULED
Status: DISCONTINUED | OUTPATIENT
Start: 2018-03-30 | End: 2018-03-30 | Stop reason: HOSPADM

## 2018-03-30 RX ORDER — CEPHALEXIN 500 MG/1
500 CAPSULE ORAL EVERY 8 HOURS SCHEDULED
Qty: 6 CAPSULE | Refills: 0 | Status: SHIPPED | OUTPATIENT
Start: 2018-03-30 | End: 2018-04-01

## 2018-03-30 RX ADMIN — TICAGRELOR 90 MG: 90 TABLET ORAL at 08:37

## 2018-03-30 RX ADMIN — PANTOPRAZOLE SODIUM 40 MG: 40 TABLET, DELAYED RELEASE ORAL at 07:16

## 2018-03-30 RX ADMIN — PRIMIDONE 250 MG: 250 TABLET ORAL at 08:37

## 2018-03-30 RX ADMIN — CARBAMAZEPINE 200 MG: 200 TABLET ORAL at 08:37

## 2018-03-30 RX ADMIN — IPRATROPIUM BROMIDE AND ALBUTEROL SULFATE 3 ML: .5; 3 SOLUTION RESPIRATORY (INHALATION) at 07:04

## 2018-03-30 RX ADMIN — ATENOLOL 50 MG: 50 TABLET ORAL at 08:37

## 2018-03-30 RX ADMIN — CEPHALEXIN 500 MG: 500 CAPSULE ORAL at 13:36

## 2018-03-30 RX ADMIN — HYDRALAZINE HYDROCHLORIDE 10 MG: 10 TABLET, FILM COATED ORAL at 08:37

## 2018-03-30 RX ADMIN — AMLODIPINE BESYLATE 10 MG: 10 TABLET ORAL at 08:37

## 2018-04-09 ENCOUNTER — OFFICE VISIT (OUTPATIENT)
Dept: CARDIOLOGY | Facility: CLINIC | Age: 63
End: 2018-04-09

## 2018-04-09 ENCOUNTER — HOSPITAL ENCOUNTER (OUTPATIENT)
Dept: CARDIOLOGY | Facility: HOSPITAL | Age: 63
Discharge: HOME | End: 2018-04-09
Admitting: NURSE PRACTITIONER

## 2018-04-09 VITALS
HEART RATE: 86 BPM | HEIGHT: 67 IN | SYSTOLIC BLOOD PRESSURE: 124 MMHG | DIASTOLIC BLOOD PRESSURE: 76 MMHG | BODY MASS INDEX: 28.41 KG/M2 | WEIGHT: 181 LBS

## 2018-04-09 DIAGNOSIS — R60.0 LOCALIZED EDEMA: ICD-10-CM

## 2018-04-09 DIAGNOSIS — I27.20 PULMONARY HYPERTENSION (HCC): ICD-10-CM

## 2018-04-09 DIAGNOSIS — I50.33 ACUTE ON CHRONIC DIASTOLIC HEART FAILURE (HCC): Primary | ICD-10-CM

## 2018-04-09 DIAGNOSIS — I25.10 CORONARY ARTERY DISEASE INVOLVING NATIVE CORONARY ARTERY OF NATIVE HEART WITHOUT ANGINA PECTORIS: Chronic | ICD-10-CM

## 2018-04-09 DIAGNOSIS — I48.20 CHRONIC ATRIAL FIBRILLATION (HCC): ICD-10-CM

## 2018-04-09 DIAGNOSIS — J96.21 ACUTE ON CHRONIC RESPIRATORY FAILURE WITH HYPOXIA (HCC): ICD-10-CM

## 2018-04-09 DIAGNOSIS — I10 ESSENTIAL HYPERTENSION: ICD-10-CM

## 2018-04-09 LAB
BH CV LOWER VASCULAR LEFT COMMON FEMORAL AUGMENT: NORMAL
BH CV LOWER VASCULAR LEFT COMMON FEMORAL COMPETENT: NORMAL
BH CV LOWER VASCULAR LEFT COMMON FEMORAL COMPRESS: NORMAL
BH CV LOWER VASCULAR LEFT COMMON FEMORAL PHASIC: NORMAL
BH CV LOWER VASCULAR LEFT COMMON FEMORAL SPONT: NORMAL
BH CV LOWER VASCULAR LEFT DISTAL FEMORAL COMPRESS: NORMAL
BH CV LOWER VASCULAR LEFT GASTRONEMIUS COMPRESS: NORMAL
BH CV LOWER VASCULAR LEFT GREATER SAPH AK COMPRESS: NORMAL
BH CV LOWER VASCULAR LEFT GREATER SAPH BK COMPRESS: NORMAL
BH CV LOWER VASCULAR LEFT LESSER SAPH COMPRESS: NORMAL
BH CV LOWER VASCULAR LEFT MID FEMORAL AUGMENT: NORMAL
BH CV LOWER VASCULAR LEFT MID FEMORAL COMPETENT: NORMAL
BH CV LOWER VASCULAR LEFT MID FEMORAL COMPRESS: NORMAL
BH CV LOWER VASCULAR LEFT MID FEMORAL PHASIC: NORMAL
BH CV LOWER VASCULAR LEFT MID FEMORAL SPONT: NORMAL
BH CV LOWER VASCULAR LEFT PERONEAL COMPRESS: NORMAL
BH CV LOWER VASCULAR LEFT POPLITEAL AUGMENT: NORMAL
BH CV LOWER VASCULAR LEFT POPLITEAL COMPETENT: NORMAL
BH CV LOWER VASCULAR LEFT POPLITEAL COMPRESS: NORMAL
BH CV LOWER VASCULAR LEFT POPLITEAL PHASIC: NORMAL
BH CV LOWER VASCULAR LEFT POPLITEAL SPONT: NORMAL
BH CV LOWER VASCULAR LEFT POSTERIOR TIBIAL COMPRESS: NORMAL
BH CV LOWER VASCULAR LEFT PROXIMAL FEMORAL COMPRESS: NORMAL
BH CV LOWER VASCULAR LEFT SAPHENOFEMORAL JUNCTION AUGMENT: NORMAL
BH CV LOWER VASCULAR LEFT SAPHENOFEMORAL JUNCTION COMPETENT: NORMAL
BH CV LOWER VASCULAR LEFT SAPHENOFEMORAL JUNCTION COMPRESS: NORMAL
BH CV LOWER VASCULAR LEFT SAPHENOFEMORAL JUNCTION PHASIC: NORMAL
BH CV LOWER VASCULAR LEFT SAPHENOFEMORAL JUNCTION SPONT: NORMAL
BH CV LOWER VASCULAR RIGHT COMMON FEMORAL AUGMENT: NORMAL
BH CV LOWER VASCULAR RIGHT COMMON FEMORAL COMPETENT: NORMAL
BH CV LOWER VASCULAR RIGHT COMMON FEMORAL COMPRESS: NORMAL
BH CV LOWER VASCULAR RIGHT COMMON FEMORAL PHASIC: NORMAL
BH CV LOWER VASCULAR RIGHT COMMON FEMORAL SPONT: NORMAL

## 2018-04-09 PROCEDURE — 99214 OFFICE O/P EST MOD 30 MIN: CPT | Performed by: NURSE PRACTITIONER

## 2018-04-09 PROCEDURE — 93971 EXTREMITY STUDY: CPT

## 2018-04-09 PROCEDURE — 93000 ELECTROCARDIOGRAM COMPLETE: CPT | Performed by: NURSE PRACTITIONER

## 2018-04-09 RX ORDER — METOPROLOL SUCCINATE 100 MG/1
100 TABLET, EXTENDED RELEASE ORAL DAILY
COMMUNITY
End: 2020-01-10 | Stop reason: HOSPADM

## 2018-04-09 NOTE — PROGRESS NOTES
Date of Office Visit: 2018  Encounter Provider: FERDINAND Pate  Place of Service: [facility]  Patient Name: Destiny Catherine  :1955    Chief Complaint   Patient presents with   • Congestive Heart Failure     Diastolic   :     HPI: Destiny Catherine is a 62 y.o. female who presents today for Hospital follow-up.  She is a new patient to me and her previous records have been reviewed.  She has a past medical history of acute on chronic diastolic heart failure, pulmonary hypertension, nicotine dependence, coronary artery disease, myocardial infarction , atrial fibrillation, stroke ×2, cerebral aneurysm status post stenting (Brilinta), hypertension, and hyperlipidemia.  She is an established patient of Dr. Floyd Musa and was last seen in our office in 2017.    She recently presented to [facility] emergency department after having a syncopal episode.  She was hypoxic in the ED and was transferred to [facility].  and was diagnosed with acute on chronic diastolic heart failure with severe pulmonary hypertension.  She diuresed successfully and was placed on home oxygen.  She was also diagnosed with a urinary tract infection and started on antibiotic therapy.  A 2-D echocardiogram was obtained which showed the following: EF 57%, RVSP 73 mmHg, aortic valve calcification, mild tricuspid valve regurgitation, left atrium moderately dilated, and moderately reduced right ventricular systolic function.    Mrs. Catherine presents today for follow-up. Upon review of the discharge summary, she was on atenolol and she is now taking metoprolol which was changed by her primary care provider.  She was also on hydralazine 10 mg 3 times daily which was also discontinued.  She has continued with the other medications including the furosemide as directed.  Her blood pressure at home is been averaging 120s over 70s.  She has continued on 3 L oxygen nasal cannula and feels that  her shortness of breath has resolved.  She has noticed left lower extremity edema, not present on the right.  She denies any redness, warmness, or calf pain.  She remains fatigued.  She denies chest pain, paroxysmal nocturnal dyspnea, orthopnea, cough, palpitations, dizziness, or syncope.    The following portion of the patient's history were reviewed and updated as appropriate: past medical history, past surgical history, past social history, past family history, allergies, current medications, and problem list.    Past Medical History:   Diagnosis Date   • Acute and chronic respiratory failure with hypoxia    • Acute on chronic diastolic heart failure    • Anemia    • Atrial fibrillation    • CAD (coronary artery disease)    • Epilepsy    • History of renal stent 4/27/2017   • Hyperlipidemia    • Hypertension    • Leukocytosis    • Malignant neoplasm of anus 4/11/2018   • Myocardial infarction 2000   • Nonruptured cerebral aneurysm    • Pulmonary hypertension    • Stroke     2009 and January 2010- [facility]   • Tobacco abuse    • UTI (urinary tract infection)        Past Surgical History:   Procedure Laterality Date   • APPENDECTOMY     • CEREBRAL ANGIOGRAM      stent placement at [facility]   • CHOLECYSTECTOMY     • CORONARY STENT PLACEMENT     • TOTAL HIP ARTHROPLASTY         Social History     Social History   • Marital status:      Spouse name: N/A   • Number of children: N/A   • Years of education: N/A     Occupational History   • Not on file.     Social History Main Topics   • Smoking status: Former Smoker     Types: Electronic Cigarette   • Smokeless tobacco: Never Used      Comment: caffine use   • Alcohol use No   • Drug use: No   • Sexual activity: Not on file       Family History   Problem Relation Age of Onset   • Hypertension Mother    • Lung cancer Mother    • Heart disease Father    • Stroke Father        Review of Systems   Constitution: Positive for malaise/fatigue. Negative for chills, diaphoresis,  fever, night sweats, weight gain and weight loss.   HENT: Negative for hearing loss, nosebleeds, sore throat and tinnitus.    Eyes: Negative for blurred vision, double vision, pain and visual disturbance.   Cardiovascular: Positive for dyspnea on exertion and leg swelling. Negative for chest pain, claudication, cyanosis, irregular heartbeat, near-syncope, orthopnea, palpitations, paroxysmal nocturnal dyspnea and syncope.   Respiratory: Positive for snoring and wheezing. Negative for cough and hemoptysis.    Endocrine: Negative for cold intolerance, heat intolerance and polyuria.   Hematologic/Lymphatic: Negative for bleeding problem. Does not bruise/bleed easily.   Skin: Negative for color change, dry skin, flushing and itching.   Musculoskeletal: Negative for falls, joint pain, joint swelling, muscle cramps, muscle weakness and myalgias.   Gastrointestinal: Negative for abdominal pain, constipation, heartburn, melena, nausea and vomiting.   Genitourinary: Positive for frequency. Negative for dysuria and hematuria.   Neurological: Positive for excessive daytime sleepiness. Negative for dizziness, light-headedness, loss of balance, numbness, paresthesias, seizures and vertigo.   Psychiatric/Behavioral: Negative for altered mental status, depression, memory loss and substance abuse. The patient does not have insomnia and is not nervous/anxious.    Allergic/Immunologic: Negative for environmental allergies.       Allergies   Allergen Reactions   • Plavix [Clopidogrel Bisulfate] Other (See Comments)     bruises         Current Outpatient Prescriptions:   •  amLODIPine (NORVASC) 10 MG tablet, Take 10 mg by mouth Daily., Disp: , Rfl:   •  carBAMazepine (TEGretol) 200 MG tablet, Take 200 mg by mouth 2 (Two) Times a Day., Disp: , Rfl:   •  Ergocalciferol (VITAMIN D2 PO), Take 50,000 tablets by mouth 1 (One) Time Per Week., Disp: , Rfl:   •  esomeprazole (nexIUM) 40 MG capsule, Take 40 mg by mouth Every Morning Before  "Breakfast., Disp: , Rfl:   •  furosemide (LASIX) 40 MG tablet, Take 1 tablet by mouth Daily. (Patient taking differently: Take 40 mg by mouth Daily. 60mg daily), Disp: 30 tablet, Rfl: 0  •  ipratropium-albuterol (COMBIVENT RESPIMAT)  MCG/ACT inhaler, Inhale 1 puff 4 (Four) Times a Day., Disp: 4 g, Rfl: 0  •  metoprolol succinate XL (TOPROL-XL) 100 MG 24 hr tablet, Take 100 mg by mouth Daily., Disp: , Rfl:   •  primidone (MYSOLINE) 250 MG tablet, Take 250 mg by mouth Daily., Disp: , Rfl:   •  rosuvastatin (CRESTOR) 40 MG tablet, Take 40 mg by mouth Every Night., Disp: , Rfl:   •  ticagrelor (BRILINTA) 90 MG tablet tablet, Take 90 mg by mouth 2 (Two) Times a Day., Disp: , Rfl:       Objective:     Vitals:    04/09/18 1001   BP: 124/76   Pulse: 86   Weight: 82.1 kg (181 lb)   Height: 170.2 cm (67\")     Body mass index is 28.35 kg/m².    PHYSICAL EXAM:    Vitals Reviewed.   General Appearance: No acute distress, well developed and well nourished.   Eyes: Conjunctiva and lids: No erythema, swelling, or discharge. Sclera non-icteric.   HENT: Atraumatic, normocephalic. External eyes, ears, and nose normal. No hearing loss noted. Mucous membranes normal. Lips not cyanotic. Neck supple with no tenderness.  Respiratory: No signs of respiratory distress. Respiration rhythm and depth normal. 3 L nasal cannula oxygen, diminished lung sounds.  Cardiovascular:  Jugular Venous Pressure: Normal  Heart Rate and Rhythm: Irregularly, irregular.  Heart Sounds: Normal S1 and S2. No S3 or S4 noted.  Murmurs: No murmurs noted. No rubs, thrills, or gallops.   Arterial Pulses: Carotid pulses normal. No carotid bruit noted. Posterior tibialis and dorsalis pedis pulses normal.   Lower Extremities: S1 left lower extremity edema noted.  Gastrointestinal:  Abdomen soft, non-distended, non-tender. Normal bowel sounds. No hepatomegaly.   Musculoskeletal: Normal movement of extremities  Skin and Nails: General appearance normal. No pallor, " cyanosis, diaphoresis. Skin temperature normal. No clubbing of fingernails.   Psychiatric: Patient alert and oriented to person, place, and time. Speech and behavior appropriate. Normal mood and affect.       ECG 12 Lead  Date/Time: 4/9/2018 10:02 AM  Performed by: RAIN NICOLE  Authorized by: RAIN NICOLE   Comparison: compared with previous ECG from 4/27/2017  Similar to previous ECG  Rhythm: atrial fibrillation  Rate: normal  BPM: 86  Conduction: conduction normal  ST Segments: ST segments normal  T Waves: T waves normal  QRS axis: normal  Other: no other findings  Clinical impression: abnormal ECG              Assessment:       Diagnosis Plan   1. Acute on chronic diastolic heart failure     2. Pulmonary hypertension     3. Acute on chronic respiratory failure with hypoxia     4. Localized edema  Duplex Venous Lower Extremity - Left CAR   5. Chronic atrial fibrillation  Duplex Venous Lower Extremity - Left CAR   6. Coronary artery disease involving native coronary artery of native heart without angina pectoris     7. Essential hypertension            Plan:       1.  Diastolic Heart failure: She appears well compensated today and her shortness of breath has improved on oxygen therapy.  She remains on her diuretic therapy and her beta blocker was changed to Toprol. Hydralazine discontinued.  We discussed the importance of following a low sodium diet and calling with any worsening symptoms of heart failure.    Heart Failure  Assessment  • NYHA class III-A - There is limitation of physical activity. The patient is comfortable at rest, but ordinary activity causes fatigue, palpitations or shortness of breath.  • Beta blocker prescribed  • Diuretics prescribed  • Calcium channel blockers prescribed  • Left ventricular function is normal by qualitative assessment    Plan  • The patient has received heart failure education on the following topics: dietary sodium restriction, medication instructions, smoking  cessation, symptom management, physical activity and weight monitoring        2.  Pulmonary Hypertension: Severe; We'll continue with her diuretic therapy.    3.  Respiratory Failure with hypoxia: This is followed by her pulmonologist and she remains on oxygen.    4.  Edema: She has +1 left lower extremity edema and no edema present on the right.  I'm concerned with the possibility of a blood clot and will arrange for a left venous duplex today.    5.  Atrial fibrillation: Chronic and rate controlled.  She has not felt to be an anticoagulant candidate and will remain on Brilinta for her previous stroke.    Atrial Fibrillation and Atrial Flutter  Assessment  • The patient has persistent atrial fibrillation  • The patient's CHADS2-VASc score is 6  • A [id number] score of 2 or more is considered a high risk for a thromboembolic event    Plan  • Continue in atrial fibrillation with rate control  • Continue beta blocker for rate control    6. Coronary Artery Disease  Assessment  • The patient has no angina    Plan  • Lifestyle modifications discussed include adhering to a heart healthy diet, avoidance of tobacco products, maintenance of a healthy weight, medication compliance, regular exercise and regular monitoring of cholesterol and blood pressure    Subjective - Objective  • There is a history of past MI  • Current antiplatelet therapy includes ticagrelor 90 mg      7.  Hypertension: Her blood pressures averaging 120s over 70s at home is well-controlled today.  Her hydralazine was recently discontinued.    8.  She will follow-up with Dr. Floyd Musa in 4-6 weeks at the [place] location.    Addendum 4/10/18: I reviewed the left venous duplex and this was negative for DVT.  She was informed and verbalizes understanding.    As always, it has been a pleasure to participate in your patient's care.      Sincerely,         FERDINAND Art

## 2018-04-11 PROBLEM — R60.0 LOCALIZED EDEMA: Status: ACTIVE | Noted: 2018-04-11

## 2018-04-11 PROBLEM — C21.0 MALIGNANT NEOPLASM OF ANUS (HCC): Status: RESOLVED | Noted: 2018-04-11 | Resolved: 2018-04-11

## 2018-04-11 PROBLEM — C21.0 MALIGNANT NEOPLASM OF ANUS (HCC): Status: ACTIVE | Noted: 2018-04-11

## 2018-05-03 ENCOUNTER — OFFICE VISIT (OUTPATIENT)
Dept: CARDIOLOGY | Facility: CLINIC | Age: 63
End: 2018-05-03

## 2018-05-03 VITALS
HEART RATE: 82 BPM | WEIGHT: 175 LBS | BODY MASS INDEX: 27.47 KG/M2 | SYSTOLIC BLOOD PRESSURE: 112 MMHG | DIASTOLIC BLOOD PRESSURE: 84 MMHG | HEIGHT: 67 IN

## 2018-05-03 DIAGNOSIS — I50.32 CHRONIC DIASTOLIC HEART FAILURE (HCC): Primary | ICD-10-CM

## 2018-05-03 DIAGNOSIS — J96.21 ACUTE ON CHRONIC RESPIRATORY FAILURE WITH HYPOXIA (HCC): ICD-10-CM

## 2018-05-03 DIAGNOSIS — I48.20 CHRONIC ATRIAL FIBRILLATION (HCC): ICD-10-CM

## 2018-05-03 DIAGNOSIS — Z72.0 TOBACCO ABUSE: Chronic | ICD-10-CM

## 2018-05-03 DIAGNOSIS — I27.20 PULMONARY HYPERTENSION (HCC): Chronic | ICD-10-CM

## 2018-05-03 DIAGNOSIS — I25.2 HISTORY OF MYOCARDIAL INFARCTION IN ADULTHOOD: Chronic | ICD-10-CM

## 2018-05-03 PROBLEM — I48.91 ATRIAL FIBRILLATION (HCC): Chronic | Status: ACTIVE | Noted: 2017-04-27

## 2018-05-03 PROCEDURE — 93000 ELECTROCARDIOGRAM COMPLETE: CPT | Performed by: INTERNAL MEDICINE

## 2018-05-03 PROCEDURE — 99214 OFFICE O/P EST MOD 30 MIN: CPT | Performed by: INTERNAL MEDICINE

## 2018-05-03 NOTE — PROGRESS NOTES
Subjective:     Encounter Date:04/27/2017      Patient ID: Destiny Catherine is a 62 y.o. female.    Chief Complaint: CAD, Pulm HTN  History of Present Illness    Dear Dr. Unger,    I had the pleasure of seeing this patient in the office today for follow-up of her cardiac status.  He was recently seen in our office by Angélica MOYA in follow-up after her recent hospitalization for heart failure and pulmonary hypertension.    Today she says she's continuing to do well.  Her weight has been stable.  She is not having any new complaints.  She denies any chest pain or chest discomfort.  No new shortness of breath.  She does get short of breath with exertion but she feels like she is back to baseline.  Her weight has been stable with no changes since she's been home.  She still feels fatigued.    She recently presented to [facility] emergency department after having a syncopal episode.  She was hypoxic in the ED and was transferred to [facility].  and was diagnosed with acute on chronic diastolic heart failure with severe pulmonary hypertension.  She diuresed successfully and was placed on home oxygen.  She was also diagnosed with a urinary tract infection and started on antibiotic therapy.  A 2-D echocardiogram was obtained which showed the following: EF 57%, RVSP 73 mmHg, aortic valve calcification, mild tricuspid valve regurgitation, left atrium moderately dilated, and moderately reduced right ventricular systolic function.    She has a history of CAD and had a myocardial infarction in 2000. She had a catheter-based intervention at that time. That was before she was seen by Dr. Rausch and apparently he was never able to get the records. However, she states that since then she has not had any problems. She has not had any complaints of chest pain, pressure, tightness, squeezing, or heartburn. She has not been hospitalized or her heart for any reason. She has had 2 strokes in the past. She had a stroke in  2009 with distal beading on an angiogram at [facility] of the distal MCA and YOEL, then she had a cerebellar CVA in January 2010. Apparently on one of these to hospitalization she was started on Aggrenox and she has been on it ever since until it was just recently stopped.. She had a cardiac evaluation at [facility] including a transesophageal echocardiogram that was normal. Normal LV systolic function and no cardiac source of emboli seen. Valvular function was normal.     The following portions of the patient's history were reviewed and updated as appropriate: allergies, current medications, past family history, past medical history, past social history, past surgical history and problem list.    Past Medical History:   Diagnosis Date   • Acute and chronic respiratory failure with hypoxia    • Acute on chronic diastolic heart failure    • Anemia    • Atrial fibrillation    • CAD (coronary artery disease)    • Chronic diastolic heart failure 4/27/2017   • Epilepsy    • History of renal stent 4/27/2017   • Hyperlipidemia    • Hypertension    • Leukocytosis    • Malignant neoplasm of anus 4/11/2018   • Myocardial infarction 2000   • Nonruptured cerebral aneurysm    • Pulmonary hypertension    • Stroke     2009 and January 2010- [facility]   • Tobacco abuse    • UTI (urinary tract infection)        Past Surgical History:   Procedure Laterality Date   • APPENDECTOMY     • CEREBRAL ANGIOGRAM      stent [facility]   • CHOLECYSTECTOMY     • CORONARY STENT PLACEMENT     • TOTAL HIP ARTHROPLASTY         Social History     Social History   • Marital status:      Spouse name: N/A   • Number of children: N/A   • Years of education: N/A     Occupational History   • Not on file.     Social History Main Topics   • Smoking status: Former Smoker     Types: Electronic Cigarette   • Smokeless tobacco: Never Used      Comment: caffine use   • Alcohol use No   • Drug use: No   • Sexual activity: Not on  "file     Other Topics Concern   • Not on file     Social History Narrative   • No narrative on file       Review of Systems   Constitution: Positive for weakness and malaise/fatigue. Negative for chills, decreased appetite, fever and night sweats.   HENT: Negative for ear discharge, ear pain, hearing loss, nosebleeds and sore throat.    Eyes: Negative for blurred vision, double vision and pain.   Cardiovascular: Negative for cyanosis.   Respiratory: Negative for hemoptysis and sputum production.    Endocrine: Negative for cold intolerance and heat intolerance.   Hematologic/Lymphatic: Negative for adenopathy.   Skin: Negative for dry skin, itching, nail changes, rash and suspicious lesions.   Musculoskeletal: Negative for arthritis, gout, muscle cramps, muscle weakness, myalgias and neck pain.   Gastrointestinal: Negative for anorexia, bowel incontinence, constipation, diarrhea, dysphagia, hematemesis and jaundice.   Genitourinary: Negative for bladder incontinence, dysuria, flank pain, frequency, hematuria and nocturia.   Neurological: Negative for focal weakness, numbness, paresthesias and seizures.   Psychiatric/Behavioral: Negative for altered mental status, hallucinations, hypervigilance, suicidal ideas and thoughts of violence.   Allergic/Immunologic: Negative for persistent infections.     An ECG was not performed during today's exam  ECG 12 Lead  Date/Time: 5/3/2018 3:51 PM  Performed by: ZURI FAY III.  Authorized by: ZURI FAY III                  Objective:     Vitals:    05/03/18 1451   BP: 112/84   Pulse: 82   Weight: 79.4 kg (175 lb)   Height: 170.2 cm (67\")         Physical Exam   Constitutional: She is oriented to person, place, and time. She appears well-developed and well-nourished. No distress.   HENT:   Head: Normocephalic and atraumatic.   Nose: Nose normal.   Mouth/Throat: Oropharynx is clear and moist.   Eyes: Conjunctivae and EOM are normal. Pupils are equal, round, and reactive to " light. Right eye exhibits no discharge. Left eye exhibits no discharge.   Neck: Normal range of motion. Neck supple. No tracheal deviation present. No thyromegaly present.   Cardiovascular: Normal rate, S1 normal, S2 normal, normal heart sounds and normal pulses.  An irregularly irregular rhythm present. Exam reveals no S3.    Pulmonary/Chest: Effort normal and breath sounds normal. No stridor. No respiratory distress. She exhibits no tenderness.   Abdominal: Soft. Bowel sounds are normal. She exhibits no distension and no mass. There is no tenderness. There is no rebound and no guarding.   Musculoskeletal: Normal range of motion. She exhibits no tenderness or deformity.   Lymphadenopathy:     She has no cervical adenopathy.   Neurological: She is alert and oriented to person, place, and time. She has normal reflexes.   Skin: Skin is warm and dry. No rash noted. She is not diaphoretic. No erythema.   Psychiatric: She has a normal mood and affect. Thought content normal.       Lab Review:             Performed        Assessment:          Diagnosis Plan   1. Chronic diastolic heart failure  ECG 12 Lead   2. Chronic atrial fibrillation  ECG 12 Lead   3. Pulmonary hypertension  ECG 12 Lead   4. Acute on chronic respiratory failure with hypoxia     5. History of myocardial infarction in adulthood     6. Tobacco abuse            Plan:       1. Atrial Fibrillation and Atrial Flutter  Assessment  • The patient has permanent atrial fibrillation  • This is non-valvular in etiology  • The patient's CHADS2-VASc score is 5  • A [id number] score of 2 or more is considered a high risk for a thromboembolic event  • Dabigatran not prescribed for medical reasons  • Rivaroxaban not prescribed for medical reasons  • Apixaban not prescribed for medical reasons    Plan  • Continue in atrial fibrillation with rate control  • Continue aspirin for antithrombotic therapy, bleeding issues discussed  • Continue beta blocker and diltiazem for  rate control        2. Coronary Artery Disease  Assessment  • The patient has no angina    Plan  • Lifestyle modifications discussed include adhering to a heart healthy diet, avoidance of tobacco products, maintenance of a healthy weight, medication compliance, regular exercise and regular monitoring of cholesterol and blood pressure    Subjective - Objective  • There is a history of past MI  • Current antiplatelet therapy includes aspirin 81 mg and ticagrelor 90 mg      3. Heart Failure  Assessment  • Left ventricular function is normal by qualitative assessment  • Patient has chronic diastolic heart failure.  We will continue her diuretic therapy and blood pressure control.    Subjective/Objective    • Physical exam findings negative for S3 gallop.    4.  Pulmonary hypertension-continue current medical regimen  5.  Chronic respiratory failure  6.  Mixed hyperlipidemia-continue lipid-lowering therapy  Thank you very much for allowing us to participate in the care of this pleasant patient.  Please don't hesitate to call if I can be of assistance in any way.      Current Outpatient Prescriptions:   •  amLODIPine (NORVASC) 10 MG tablet, Take 10 mg by mouth Daily., Disp: , Rfl:   •  carBAMazepine (TEGretol) 200 MG tablet, Take 200 mg by mouth 2 (Two) Times a Day., Disp: , Rfl:   •  Ergocalciferol (VITAMIN D2 PO), Take 50,000 tablets by mouth 1 (One) Time Per Week., Disp: , Rfl:   •  esomeprazole (nexIUM) 40 MG capsule, Take 40 mg by mouth Every Morning Before Breakfast., Disp: , Rfl:   •  furosemide (LASIX) 40 MG tablet, Take 1 tablet by mouth Daily. (Patient taking differently: Take 40 mg by mouth Daily. 60mg daily), Disp: 30 tablet, Rfl: 0  •  ipratropium-albuterol (COMBIVENT RESPIMAT)  MCG/ACT inhaler, Inhale 1 puff 4 (Four) Times a Day., Disp: 4 g, Rfl: 0  •  metoprolol succinate XL (TOPROL-XL) 100 MG 24 hr tablet, Take 100 mg by mouth Daily., Disp: , Rfl:   •  primidone (MYSOLINE) 250 MG tablet, Take 250 mg  by mouth Daily., Disp: , Rfl:   •  rosuvastatin (CRESTOR) 40 MG tablet, Take 40 mg by mouth Every Night., Disp: , Rfl:   •  ticagrelor (BRILINTA) 90 MG tablet tablet, Take 90 mg by mouth 2 (Two) Times a Day., Disp: , Rfl:          EMR Dragon/Transcription disclaimer:    Much of this encounter note is an electronic transcription/translation of spoken language to printed text. The electronic translation of spoken language may permit erroneous, or at times, nonsensical words or phrases to be inadvertently transcribed; Although I have reviewed the note for such errors, some may still exist.

## 2018-12-05 ENCOUNTER — OFFICE VISIT (OUTPATIENT)
Dept: CARDIOLOGY | Facility: CLINIC | Age: 63
End: 2018-12-05

## 2018-12-05 VITALS
HEIGHT: 67 IN | SYSTOLIC BLOOD PRESSURE: 120 MMHG | HEART RATE: 77 BPM | DIASTOLIC BLOOD PRESSURE: 82 MMHG | BODY MASS INDEX: 28.25 KG/M2 | WEIGHT: 180 LBS

## 2018-12-05 DIAGNOSIS — I50.32 CHRONIC DIASTOLIC HEART FAILURE (HCC): Chronic | ICD-10-CM

## 2018-12-05 DIAGNOSIS — I48.20 CHRONIC ATRIAL FIBRILLATION (HCC): Chronic | ICD-10-CM

## 2018-12-05 DIAGNOSIS — I25.10 CORONARY ARTERY DISEASE INVOLVING NATIVE CORONARY ARTERY OF NATIVE HEART WITHOUT ANGINA PECTORIS: Primary | Chronic | ICD-10-CM

## 2018-12-05 PROCEDURE — 99213 OFFICE O/P EST LOW 20 MIN: CPT | Performed by: INTERNAL MEDICINE

## 2018-12-05 PROCEDURE — 93000 ELECTROCARDIOGRAM COMPLETE: CPT | Performed by: INTERNAL MEDICINE

## 2018-12-05 NOTE — PROGRESS NOTES
Subjective:     Encounter Date: 12/5/2018      Patient ID: Destiny Catherine is a 63 y.o. female.    Chief Complaint: CAD, Pulm HTN  History of Present Illness    Dear Dr. Unger,    I had the pleasure of seeing this patient in the office today for follow-up of her cardiac status.      Today she says she's continuing to do well.  She has not had any new cardiac complaints.  Her family is with her-they state that when she walks she seems to take deep breaths but she says she does not feel any shortness of breath with that.  Her weight has been stable.  She is not having any new complaints.  She denies any chest pain or chest discomfort.  No new shortness of breath.  She does get short of breath with exertion but she feels like she is back to baseline.  Her weight has been stable with no changes since she's been home.  She still feels fatigued.    She presented to [facility] emergency department 3/2018 after having a syncopal episode.  She was hypoxic in the ED and was transferred to [facility].  and was diagnosed with acute on chronic diastolic heart failure with severe pulmonary hypertension.  She diuresed successfully and was placed on home oxygen.  She was also diagnosed with a urinary tract infection and started on antibiotic therapy.  A 2-D echocardiogram was obtained which showed the following: EF 57%, RVSP 73 mmHg, aortic valve calcification, mild tricuspid valve regurgitation, left atrium moderately dilated, and moderately reduced right ventricular systolic function.    She has a history of CAD and had a myocardial infarction in 2000. She had a catheter-based intervention at that time. That was before she was seen by Dr. Rausch and apparently he was never able to get the records. However, she states that since then she has not had any problems. She has not had any complaints of chest pain, pressure, tightness, squeezing, or heartburn. She has not been hospitalized or her heart for any reason. She has  had 2 strokes in the past. She had a stroke in 2009 with distal beading on an angiogram at [facility] of the distal MCA and YOEL, then she had a cerebellar CVA in January 2010. Apparently on one of these to hospitalization she was started on Aggrenox and she has been on it ever since until it was just recently stopped.. She had a cardiac evaluation at [facility] including a transesophageal echocardiogram that was normal. Normal LV systolic function and no cardiac source of emboli seen. Valvular function was normal.     The following portions of the patient's history were reviewed and updated as appropriate: allergies, current medications, past family history, past medical history, past social history, past surgical history and problem list.    Past Medical History:   Diagnosis Date   • Acute and chronic respiratory failure with hypoxia (CMS/HCC)    • Acute on chronic diastolic heart failure (CMS/HCC)    • Anemia    • Atrial fibrillation (CMS/HCC)    • CAD (coronary artery disease)    • Chronic diastolic heart failure (CMS/HCC) 4/27/2017   • Epilepsy (CMS/HCC)    • History of renal stent 4/27/2017   • Hyperlipidemia    • Hypertension    • Leukocytosis    • Malignant neoplasm of anus (CMS/HCC) 4/11/2018   • Myocardial infarction (CMS/HCC) 2000   • Nonruptured cerebral aneurysm    • Pulmonary hypertension (CMS/HCC)    • Stroke (CMS/HCC)     2009 and January 2010- [facility]   • Tobacco abuse    • UTI (urinary tract infection)        Past Surgical History:   Procedure Laterality Date   • APPENDECTOMY     • CEREBRAL ANGIOGRAM      stent placement at [facility]   • CHOLECYSTECTOMY     • CORONARY STENT PLACEMENT     • TOTAL HIP ARTHROPLASTY         Social History     Socioeconomic History   • Marital status:      Spouse name: Not on file   • Number of children: Not on file   • Years of education: Not on file   • Highest education level: Not on file   Social Needs   • Financial resource strain: Not on  file   • Food insecurity - worry: Not on file   • Food insecurity - inability: Not on file   • Transportation needs - medical: Not on file   • Transportation needs - non-medical: Not on file   Occupational History   • Not on file   Tobacco Use   • Smoking status: Former Smoker     Types: Electronic Cigarette   • Smokeless tobacco: Never Used   • Tobacco comment: caffine use   Substance and Sexual Activity   • Alcohol use: No   • Drug use: No   • Sexual activity: Not on file   Other Topics Concern   • Not on file   Social History Narrative   • Not on file       Review of Systems   Constitution: Positive for weakness and malaise/fatigue. Negative for chills, decreased appetite, fever and night sweats.   HENT: Negative for ear discharge, ear pain, hearing loss, nosebleeds and sore throat.    Eyes: Negative for blurred vision, double vision and pain.   Cardiovascular: Negative for cyanosis.   Respiratory: Negative for hemoptysis and sputum production.    Endocrine: Negative for cold intolerance and heat intolerance.   Hematologic/Lymphatic: Negative for adenopathy.   Skin: Negative for dry skin, itching, nail changes, rash and suspicious lesions.   Musculoskeletal: Negative for arthritis, gout, muscle cramps, muscle weakness, myalgias and neck pain.   Gastrointestinal: Negative for anorexia, bowel incontinence, constipation, diarrhea, dysphagia, hematemesis and jaundice.   Genitourinary: Negative for bladder incontinence, dysuria, flank pain, frequency, hematuria and nocturia.   Neurological: Negative for focal weakness, numbness, paresthesias and seizures.   Psychiatric/Behavioral: Negative for altered mental status, hallucinations, hypervigilance, suicidal ideas and thoughts of violence.   Allergic/Immunologic: Negative for persistent infections.     An ECG was not performed during today's exam  ECG 12 Lead  Date/Time: 12/5/2018 1:21 PM  Performed by: Floyd Musa III, MD  Authorized by: Floyd Musa III, MD  "  Comparison: compared with previous ECG   Similar to previous ECG  Rhythm: atrial fibrillation  Rate: normal  Conduction: conduction normal  ST Segments: ST segments normal  T Waves: T waves normal  QRS axis: normal  Other: no other findings  Clinical impression: abnormal ECG               Objective:     Vitals:    12/05/18 1307   BP: 120/82   Pulse: 77   Weight: 81.6 kg (180 lb)   Height: 170.2 cm (67\")         Physical Exam   Constitutional: She is oriented to person, place, and time. She appears well-developed and well-nourished. No distress.   HENT:   Head: Normocephalic and atraumatic.   Nose: Nose normal.   Mouth/Throat: Oropharynx is clear and moist.   Eyes: Conjunctivae and EOM are normal. Pupils are equal, round, and reactive to light. Right eye exhibits no discharge. Left eye exhibits no discharge.   Neck: Normal range of motion. Neck supple. No tracheal deviation present. No thyromegaly present.   Cardiovascular: Normal rate, S1 normal, S2 normal, normal heart sounds and normal pulses. An irregularly irregular rhythm present. Exam reveals no S3.   Pulmonary/Chest: Effort normal and breath sounds normal. No stridor. No respiratory distress. She exhibits no tenderness.   Abdominal: Soft. Bowel sounds are normal. She exhibits no distension and no mass. There is no tenderness. There is no rebound and no guarding.   Musculoskeletal: Normal range of motion. She exhibits no tenderness or deformity.   Lymphadenopathy:     She has no cervical adenopathy.   Neurological: She is alert and oriented to person, place, and time. She has normal reflexes.   Skin: Skin is warm and dry. No rash noted. She is not diaphoretic. No erythema.   Psychiatric: She has a normal mood and affect. Thought content normal.       Lab Review:             Performed        Assessment:          Diagnosis Plan   1. Coronary artery disease involving native coronary artery of native heart without angina pectoris  ECG 12 Lead   2. Chronic atrial " fibrillation (CMS/[facility])  ECG 12 Lead   3. Chronic diastolic heart failure (CMS/HCC)  ECG 12 Lead          Plan:       1. Atrial Fibrillation and Atrial Flutter  Assessment  • The patient has permanent atrial fibrillation  • This is non-valvular in etiology  • The patient's CHADS2-VASc score is 5  • A [id number] score of 2 or more is considered a high risk for a thromboembolic event  • Dabigatran not prescribed for medical reasons  • Rivaroxaban not prescribed for medical reasons  • Apixaban not prescribed for medical reasons    Plan  • Continue in atrial fibrillation with rate control  • Continue aspirin for antithrombotic therapy, bleeding issues discussed  • Continue beta blocker and diltiazem for rate control        2. Coronary Artery Disease  Assessment  • The patient has no angina    Plan  • Lifestyle modifications discussed include adhering to a heart healthy diet, avoidance of tobacco products, maintenance of a healthy weight, medication compliance, regular exercise and regular monitoring of cholesterol and blood pressure    Subjective - Objective  • There is a history of past MI  • Current antiplatelet therapy includes aspirin 81 mg and ticagrelor 90 mg      3. Heart Failure  Assessment  • Left ventricular function is normal by qualitative assessment  • Patient has chronic diastolic heart failure.  We will continue her diuretic therapy and blood pressure control.    Subjective/Objective    • Physical exam findings negative for S3 gallop.    4.  Pulmonary hypertension-continue current medical regimen  5.  Chronic respiratory failure  6.  Mixed hyperlipidemia-continue lipid-lowering therapy  Thank you very much for allowing us to participate in the care of this pleasant patient.  Please don't hesitate to call if I can be of assistance in any way.      Current Outpatient Medications:   •  amLODIPine (NORVASC) 10 MG tablet, Take 10 mg by mouth Daily., Disp: , Rfl:   •  carBAMazepine (TEGretol) 200 MG tablet,  Take 200 mg by mouth 2 (Two) Times a Day., Disp: , Rfl:   •  Ergocalciferol (VITAMIN D2 PO), Take 50,000 tablets by mouth 1 (One) Time Per Week., Disp: , Rfl:   •  esomeprazole (nexIUM) 40 MG capsule, Take 40 mg by mouth Every Morning Before Breakfast., Disp: , Rfl:   •  furosemide (LASIX) 40 MG tablet, Take 1 tablet by mouth Daily. (Patient taking differently: Take 40 mg by mouth Daily. 60mg daily), Disp: 30 tablet, Rfl: 0  •  ipratropium-albuterol (COMBIVENT RESPIMAT)  MCG/ACT inhaler, Inhale 1 puff 4 (Four) Times a Day., Disp: 4 g, Rfl: 0  •  metoprolol succinate XL (TOPROL-XL) 100 MG 24 hr tablet, Take 100 mg by mouth Daily., Disp: , Rfl:   •  primidone (MYSOLINE) 250 MG tablet, Take 250 mg by mouth Daily., Disp: , Rfl:   •  rosuvastatin (CRESTOR) 40 MG tablet, Take 40 mg by mouth Every Night., Disp: , Rfl:   •  ticagrelor (BRILINTA) 90 MG tablet tablet, Take 90 mg by mouth 2 (Two) Times a Day., Disp: , Rfl:          EMR Dragon/Transcription disclaimer:    Much of this encounter note is an electronic transcription/translation of spoken language to printed text. The electronic translation of spoken language may permit erroneous, or at times, nonsensical words or phrases to be inadvertently transcribed; Although I have reviewed the note for such errors, some may still exist.

## 2019-04-04 ENCOUNTER — TELEPHONE (OUTPATIENT)
Dept: GASTROENTEROLOGY | Facility: CLINIC | Age: 64
End: 2019-04-04

## 2019-04-04 ENCOUNTER — OFFICE VISIT (OUTPATIENT)
Dept: GASTROENTEROLOGY | Facility: CLINIC | Age: 64
End: 2019-04-04

## 2019-04-04 VITALS
WEIGHT: 189.2 LBS | DIASTOLIC BLOOD PRESSURE: 84 MMHG | BODY MASS INDEX: 29.7 KG/M2 | SYSTOLIC BLOOD PRESSURE: 126 MMHG | HEIGHT: 67 IN

## 2019-04-04 DIAGNOSIS — K21.9 GASTROESOPHAGEAL REFLUX DISEASE, ESOPHAGITIS PRESENCE NOT SPECIFIED: ICD-10-CM

## 2019-04-04 DIAGNOSIS — Z12.11 ENCOUNTER FOR SCREENING FOR MALIGNANT NEOPLASM OF COLON: Primary | ICD-10-CM

## 2019-04-04 DIAGNOSIS — Z86.010 HISTORY OF COLONIC POLYPS: ICD-10-CM

## 2019-04-04 PROCEDURE — 99214 OFFICE O/P EST MOD 30 MIN: CPT | Performed by: INTERNAL MEDICINE

## 2019-04-04 RX ORDER — HYDRALAZINE HYDROCHLORIDE 10 MG/1
10 TABLET, FILM COATED ORAL 3 TIMES DAILY
COMMUNITY
End: 2019-04-04

## 2019-04-04 RX ORDER — DIGOXIN 125 MCG
125 TABLET ORAL
COMMUNITY
End: 2019-04-04

## 2019-04-04 RX ORDER — ASPIRIN 81 MG/1
81 TABLET ORAL DAILY
Status: ON HOLD | COMMUNITY
End: 2019-05-08 | Stop reason: SDDI

## 2019-04-04 RX ORDER — NITROGLYCERIN 0.4 MG/1
TABLET SUBLINGUAL
Refills: 1 | COMMUNITY
Start: 2019-03-11

## 2019-04-04 NOTE — PROGRESS NOTES
PATIENT INFORMATION  Destiny Catherine       - 1955    CHIEF COMPLAINT  Chief Complaint   Patient presents with   • Colonoscopy     due for c/s       HISTORY OF PRESENT ILLNESS  HPI  She is here to discuss cls. Last one was in , and 24 polyps removed. She was due several years ago. She has had cva and diagnosed with aneurysm and maintained on asa and  brillinta.  She denies any changes in bowel habits or blood in the stool. bm are daily.  gerd controlled on nexium. She has some issues with breakthrough symptoms but does not take any other.      REVIEW OF SYSTEMS  Review of Systems   Respiratory: Positive for cough and wheezing.    Endocrine: Positive for polyuria.         ACTIVE PROBLEMS  Patient Active Problem List    Diagnosis   • Localized edema [R60.0]   • Anemia [D64.9]   • Acute on chronic respiratory failure with hypoxia (CMS/HCC) [J96.21]   • UTI (urinary tract infection) [N39.0]   • Leukocytosis [D72.829]   • HTN (hypertension) [I10]   • Epilepsy (CMS/HCC) [G40.909]   • Atrial fibrillation (CMS/HCC) [I48.91]   • Chronic diastolic heart failure (CMS/HCC) [I50.32]   • Hypertension [I10]   • Pulmonary hypertension (CMS/HCC) [I27.20]   • Nonruptured cerebral aneurysm [I67.1]   • Coronary artery disease involving native coronary artery of native heart without angina pectoris [I25.10]   • History of renal stent [Z98.890]   • Tobacco abuse [Z72.0]   • History of myocardial infarction in adulthood [I25.2]         PAST MEDICAL HISTORY  Past Medical History:   Diagnosis Date   • Acute and chronic respiratory failure with hypoxia (CMS/HCC)    • Acute on chronic diastolic heart failure (CMS/HCC)    • Anemia    • Atrial fibrillation (CMS/HCC)    • CAD (coronary artery disease)    • Chronic diastolic heart failure (CMS/HCC) 2017   • Colon polyp    • Epilepsy (CMS/HCC)    • History of renal stent 2017   • Hyperlipidemia    • Hypertension    • Leukocytosis    • 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   • Myocardial infarction (CMS/HCC) 2000   • Nonruptured cerebral aneurysm    • Pulmonary hypertension (CMS/HCC)    • Stroke (CMS/HCC)     2009 and January 2010- [facility]   • Tobacco abuse    • UTI (urinary tract infection)          SURGICAL HISTORY  Past Surgical History:   Procedure Laterality Date   • APPENDECTOMY     • CEREBRAL ANGIOGRAM      stent placement at [facility]   • CHOLECYSTECTOMY     • CORONARY STENT PLACEMENT     • TOTAL HIP ARTHROPLASTY           FAMILY HISTORY  Family History   Problem Relation Age of Onset   • Hypertension Mother    • Lung cancer Mother    • Heart disease Father    • Stroke Father    • Colon cancer Neg Hx    • Colon polyps Neg Hx          SOCIAL HISTORY  Social History     Occupational History   • Not on file   Tobacco Use   • Smoking status: Former Smoker     Types: Electronic Cigarette   • Smokeless tobacco: Never Used   • Tobacco comment: caffine use   Substance and Sexual Activity   • Alcohol use: No   • Drug use: No   • Sexual activity: Not on file       Debilities/Disabilities Identified: None    Emotional Behavior: Appropriate    CURRENT MEDICATIONS    Current Outpatient Medications:   •  amLODIPine (NORVASC) 10 MG tablet, Take 10 mg by mouth Daily., Disp: , Rfl:   •  aspirin 81 MG EC tablet, Take 81 mg by mouth Daily., Disp: , Rfl:   •  carBAMazepine (TEGretol) 200 MG tablet, Take 200 mg by mouth 2 (Two) Times a Day., Disp: , Rfl:   •  Ergocalciferol (VITAMIN D2 PO), Take 50,000 tablets by mouth 1 (One) Time Per Week., Disp: , Rfl:   •  esomeprazole (nexIUM) 40 MG capsule, Take 40 mg by mouth Every Morning Before Breakfast., Disp: , Rfl:   •  furosemide (LASIX) 40 MG tablet, Take 1 tablet by mouth Daily. (Patient taking differently: Take 40 mg by mouth Daily. 60mg daily), Disp: 30 tablet, Rfl: 0  •  primidone (MYSOLINE) 250 MG tablet, Take 250 mg by mouth Daily., Disp: , Rfl:   •  rosuvastatin (CRESTOR) 40 MG tablet, Take 40 mg by mouth Every Night., Disp: , Rfl:   •   "ticagrelor (BRILINTA) 90 MG tablet tablet, Take 90 mg by mouth 2 (Two) Times a Day., Disp: , Rfl:   •  ipratropium-albuterol (COMBIVENT RESPIMAT)  MCG/ACT inhaler, Inhale 1 puff 4 (Four) Times a Day., Disp: 4 g, Rfl: 0  •  metoprolol succinate XL (TOPROL-XL) 100 MG 24 hr tablet, Take 100 mg by mouth Daily., Disp: , Rfl:   •  nitroglycerin (NITROSTAT) 0.4 MG SL tablet, PLEASE SEE ATTACHED FOR DETAILED DIRECTIONS, Disp: , Rfl: 1    ALLERGIES  Plavix [clopidogrel bisulfate]    VITALS  Vitals:    04/04/19 1139   BP: 126/84   Weight: 85.8 kg (189 lb 3.2 oz)   Height: 170.2 cm (67.01\")       LAST RESULTS   Office Visit on 04/09/2018   Component Date Value Ref Range Status   • Right Common Femoral Spont 04/09/2018 Y   Final   • Right Common Femoral Phasic 04/09/2018 Y   Final   • Right Common Femoral Augment 04/09/2018 Y   Final   • Right Common Femoral Competent 04/09/2018 Y   Final   • Right Common Femoral Compress 04/09/2018 C   Final   • Left Common Femoral Spont 04/09/2018 Y   Final   • Left Common Femoral Phasic 04/09/2018 Y   Final   • Left Common Femoral Augment 04/09/2018 Y   Final   • Left Common Femoral Competent 04/09/2018 Y   Final   • Left Common Femoral Compress 04/09/2018 C   Final   • Left Saphenofemoral Junction Spont 04/09/2018 Y   Final   • Left Saphenofemoral Junction Phasic 04/09/2018 Y   Final   • Left Saphenofemoral Junction Augme* 04/09/2018 Y   Final   • Left Saphenofemoral Junction Compe* 04/09/2018 Y   Final   • Left Saphenofemoral Junction Compr* 04/09/2018 C   Final   • Left Proximal Femoral Compress 04/09/2018 C   Final   • Left Mid Femoral Spont 04/09/2018 Y   Final   • Left Mid Femoral Phasic 04/09/2018 Y   Final   • Left Mid Femoral Augment 04/09/2018 Y   Final   • Left Mid Femoral Competent 04/09/2018 Y   Final   • Left Mid Femoral Compress 04/09/2018 C   Final   • Left Distal Femoral Compress 04/09/2018 C   Final   • Left Popliteal Spont 04/09/2018 Y   Final   • Left Popliteal " Phasic 04/09/2018 Y   Final   • Left Popliteal Augment 04/09/2018 Y   Final   • Left Popliteal Competent 04/09/2018 Y   Final   • Left Popliteal Compress 04/09/2018 C   Final   • Left Posterior Tibial Compress 04/09/2018 C   Final   • Left Peroneal Compress 04/09/2018 C   Final   • Left GastronemiusSoleal Compress 04/09/2018 C   Final   • Left Greater Saph AK Compress 04/09/2018 C   Final   • Left Greater Saph BK Compress 04/09/2018 C   Final   • Left Lesser Saph Compress 04/09/2018 C   Final     No results found.    PHYSICAL EXAM  Physical Exam   Constitutional: She is oriented to person, place, and time. She appears well-developed and well-nourished. No distress.   HENT:   Head: Normocephalic and atraumatic.   Mouth/Throat: Oropharynx is clear and moist.   Eyes: EOM are normal. Pupils are equal, round, and reactive to light.   Neck: Normal range of motion. No tracheal deviation present.   Cardiovascular: Normal rate, regular rhythm, normal heart sounds and intact distal pulses. Exam reveals no gallop and no friction rub.   No murmur heard.  Pulmonary/Chest: Effort normal and breath sounds normal. No stridor. No respiratory distress. She has no wheezes. She has no rales. She exhibits no tenderness.   Abdominal: Soft. Bowel sounds are normal. She exhibits no distension. There is no tenderness. There is no rebound and no guarding.   Musculoskeletal: She exhibits no edema.   Lymphadenopathy:     She has no cervical adenopathy.   Neurological: She is alert and oriented to person, place, and time.   Skin: Skin is warm. She is not diaphoretic.   Psychiatric: She has a normal mood and affect. Her behavior is normal. Judgment and thought content normal.   Nursing note and vitals reviewed.      ASSESSMENT  Diagnoses and all orders for this visit:    Encounter for screening for malignant neoplasm of colon  -     Case Request; Standing  -     Case Request    Gastroesophageal reflux disease, esophagitis presence not  specified    History of colonic polyps  -     Case Request; Standing  -     Case Request    Other orders  -     nitroglycerin (NITROSTAT) 0.4 MG SL tablet; PLEASE SEE ATTACHED FOR DETAILED DIRECTIONS  -     Discontinue: digoxin (LANOXIN) 125 MCG tablet; Take 125 mcg by mouth Daily.  -     aspirin 81 MG EC tablet; Take 81 mg by mouth Daily.  -     Discontinue: hydrALAZINE (APRESOLINE) 10 MG tablet; Take 10 mg by mouth 3 (Three) Times a Day.  -     Follow Anesthesia Guidelines / Standing Orders; Future  -     Implement Anesthesia orders day of procedure.; Standing  -     Obtain informed consent; Standing          PLAN  No Follow-up on file.      Antireflux measures and dietary modifications reviewed. Low acid diet reviewed. Keep head of bed elevated. Stop eating/drinking at least 3 hours prior to bedtime. Eliminate caffeine and carbonated beverages.  Weight loss encouraged if BMI over 25.    Indications, risks and procedure were discussed with the patient, including but not limited to, bleeding, infection, possibility of perforation and possible polypectomy. All of the patient's questions were answered, and signed informed consent was obtained and placed on the chart.    Contact Dr. Green regarding coming off brilinta prior to scope.  Contact Dr. Musa to make sure ok for scope.

## 2019-04-04 NOTE — TELEPHONE ENCOUNTER
Dr. Cedeno would like to schedule this patient for a colonoscopy. Is it okay to proceed and hold her Brillinta 5 days prior?

## 2019-04-11 PROBLEM — Z86.010 HISTORY OF COLONIC POLYPS: Status: ACTIVE | Noted: 2019-04-11

## 2019-04-11 PROBLEM — Z12.11 ENCOUNTER FOR SCREENING FOR MALIGNANT NEOPLASM OF COLON: Status: ACTIVE | Noted: 2019-04-11

## 2019-04-11 RX ORDER — SODIUM, POTASSIUM,MAG SULFATES 17.5-3.13G
1 SOLUTION, RECONSTITUTED, ORAL ORAL EVERY 12 HOURS
Qty: 2 BOTTLE | Refills: 0 | Status: ON HOLD | OUTPATIENT
Start: 2019-04-11 | End: 2019-05-08

## 2019-04-11 NOTE — TELEPHONE ENCOUNTER
Has instructions    Dr. Deborah Cedeno   Date: _____5/8/19_________     Arrival Time: __7:30am_______    Hospital:  [facility] ([address] [place] [address] [place] [address]) (back of hospital at the emergency room)      Please call the office as soon as possible if you need to reschedule or cancel your procedure please give two weeks’ notice.  If you do not show up or frequently reschedule your procedure, your provider has the option of not rescheduling.    Stop the following medications 5 days prior to your procedure- check with the prescribing doctor prior to holding.  No Aspirin, Advil, Aleve, Motrin, IBU or anything listed on the back of this form.    If you are taking any medications on the attached list and/or pills that contain iron please stop them one week prior. Insulin will be addressed separately.    1.  your prescription AND a 10 oz. bottle of Magnesium Citrate (over the counter) as soon as possible. Do not wait until the day before in case of issues with cost or etc.    The day before your procedure  It is very important that you follow the instructions on this form and not on the prep you were prescribed.    You will be on a clear liquid diet only which may include coffee , tea, soft drinks, broth(chicken beef or vegetable), popsicles, Jell-O, Gatorade, juice (no orange, grapefruit or V-8).  ®No red or purple dyes and no dairy or non-dairy.    2. At 8:00 am drink the bottle of magnesium citrate.  Drink plenty of fluids in between    3. At    2:00 pm drink first dose or ½ of prep, mix as directed on container/box    Drink plenty of fluids between    4. At   10:00 pm      drink second dose or ½ of prep, mix as directed on container/box    5. If you start to get nauseated while drinking your prep try stepping away for 15-20 min. then resume again at a slower pace.    You may have clear liquids only up to 4 hours before your procedure.  No gum or candy!     No smoking or tobacco  products!    You may only take your morning prescription blood pressure (no diuretic combinations), breathing, seizure, psych or heart medications.     You will need a  to accompany you for your exam. Bus or uber not allowed The average time spent in our facility is around 2-3 hours.  You are not to drive, operate machinery or make legal decisions the remainder of the day following you procedure.    Please call [phone] if you have questions about any of this information.  If it is after hours or the weekend and you need to reach the doctor or have questions for the office please call [phone].     It is your responsibility to check with your insurance company to determine benefits and out of pocket costs.      Avoid these medications 5-7 days prior to surgery  Please check with your prescribing doctor before stopping any medications  NSAIDS- Advil, Aleve, Motrin, Ibuprofen, Midol, Excedrin, Fiorinal, Izabella-Abie  (Some cold medications may have these in them)    All herbal medications- iron, vitamin E, fish oil, decongestants (phenylephrine, pseudoephedrine), ginkgo, garlic, ginseng, Samantha’s wart    Mobic (meloxicam), Celebrex, Diclofenac (Voltaren), Nambumetone (Relafen), Daypro, Naproxen, Sulindac, Indomethacin, Toradol, Feldine, Salsalate, Etodolac (Lodine),     Viagra, Cialis, Levitra    Aspirin, Plavix (clopidogrel), Effient, Pletal, Coumadin, Pradaxa, Brilinta, Ticlide, Eliquis, (Xaralto- 3 days)      Diet pills -Adipex (phentermine) -2 weeks prior

## 2019-04-30 ENCOUNTER — OFFICE VISIT (OUTPATIENT)
Dept: OBSTETRICS AND GYNECOLOGY | Facility: CLINIC | Age: 64
End: 2019-04-30

## 2019-04-30 VITALS
SYSTOLIC BLOOD PRESSURE: 110 MMHG | DIASTOLIC BLOOD PRESSURE: 80 MMHG | WEIGHT: 187 LBS | BODY MASS INDEX: 29.35 KG/M2 | HEIGHT: 67 IN

## 2019-04-30 DIAGNOSIS — Z01.419 PAP SMEAR, LOW-RISK: Primary | ICD-10-CM

## 2019-04-30 DIAGNOSIS — Z11.51 SPECIAL SCREENING EXAMINATION FOR HUMAN PAPILLOMAVIRUS (HPV): ICD-10-CM

## 2019-04-30 DIAGNOSIS — Z87.42 HISTORY OF ABNORMAL CERVICAL PAP SMEAR: ICD-10-CM

## 2019-04-30 PROCEDURE — 99203 OFFICE O/P NEW LOW 30 MIN: CPT | Performed by: OBSTETRICS & GYNECOLOGY

## 2019-04-30 NOTE — PROGRESS NOTES
Patient Care Team:  Becca Unger MD as PCP - General  Becca Unger MD as PCP - Family Medicine    Patient new to practice? yes Patient new to examiner? yes     -----------------------------------------------------HISTORY---------------------------------------------------    Chief Complaint:   Chief Complaint   Patient presents with   • Follow-up     pt has history of abnormal pap smear     New problem to examiner? yes    No LMP recorded. Patient is postmenopausal.    HPI: History of Present Illness      Pt states she had an abnormal      ROS:  Review of Systems   Constitutional: Negative.    HENT: Negative.    Eyes: Negative.    Respiratory: Negative.    Cardiovascular: Negative.    Gastrointestinal: Negative.    Endocrine: Negative.    Genitourinary: Negative.    Musculoskeletal: Negative.    Skin: Negative.    Allergic/Immunologic: Negative.    Neurological: Negative.    Hematological: Negative.    Psychiatric/Behavioral: Negative.    :      PFSH: PAST HISTORY REVIEWED     1.    Past Medical History:   Diagnosis Date   • Acute and chronic respiratory failure with hypoxia (CMS/HCC)    • Acute on chronic diastolic heart failure (CMS/HCC)    • Anemia    • Atrial fibrillation (CMS/HCC)    • CAD (coronary artery disease)    • Chronic diastolic heart failure (CMS/HCC) 4/27/2017   • Colon polyp    • Epilepsy (CMS/HCC)    • History of renal stent 4/27/2017   • Hyperlipidemia    • Hypertension    • Leukocytosis    • Malignant neoplasm of anus (CMS/HCC) 4/11/2018   • Myocardial infarction (CMS/HCC) 2000   • Nonruptured cerebral aneurysm    • Pulmonary hypertension (CMS/HCC)    • Stroke (CMS/HCC)     2009 and January 2010- [facility]   • Tobacco abuse    • UTI (urinary tract infection)            Status of: reviewed.      2.   Family History   Problem Relation Age of Onset   • Hypertension Mother    • Lung cancer Mother    • Heart disease Father    • Stroke Father    • Colon cancer Neg Hx    • Colon polyps Neg Hx         3. Social History: :    Employment/occupation:  no  Smoker: no  Alcohol: no Recreational drugs: no     4.   Past Surgical History:   Procedure Laterality Date   • APPENDECTOMY     • CEREBRAL ANGIOGRAM      stent placement at [facility]   • CHOLECYSTECTOMY     • CORONARY STENT PLACEMENT     • TOTAL HIP ARTHROPLASTY              5.   Current Outpatient Medications:   •  amLODIPine (NORVASC) 10 MG tablet, Take 10 mg by mouth Daily., Disp: , Rfl:   •  aspirin 81 MG EC tablet, Take 81 mg by mouth Daily., Disp: , Rfl:   •  carBAMazepine (TEGretol) 200 MG tablet, Take 200 mg by mouth 2 (Two) Times a Day., Disp: , Rfl:   •  Ergocalciferol (VITAMIN D2 PO), Take 50,000 tablets by mouth 1 (One) Time Per Week., Disp: , Rfl:   •  esomeprazole (nexIUM) 40 MG capsule, Take 40 mg by mouth Every Morning Before Breakfast., Disp: , Rfl:   •  furosemide (LASIX) 40 MG tablet, Take 1 tablet by mouth Daily. (Patient taking differently: Take 40 mg by mouth Daily. 60mg daily), Disp: 30 tablet, Rfl: 0  •  ipratropium-albuterol (COMBIVENT RESPIMAT)  MCG/ACT inhaler, Inhale 1 puff 4 (Four) Times a Day., Disp: 4 g, Rfl: 0  •  metoprolol succinate XL (TOPROL-XL) 100 MG 24 hr tablet, Take 100 mg by mouth Daily., Disp: , Rfl:   •  nitroglycerin (NITROSTAT) 0.4 MG SL tablet, PLEASE SEE ATTACHED FOR DETAILED DIRECTIONS, Disp: , Rfl: 1  •  primidone (MYSOLINE) 250 MG tablet, Take 250 mg by mouth Daily., Disp: , Rfl:   •  rosuvastatin (CRESTOR) 40 MG tablet, Take 40 mg by mouth Every Night., Disp: , Rfl:   •  sodium-potassium-magnesium sulfates (SUPREP BOWEL PREP KIT) 17.5-3.13-1.6 GM/177ML solution oral solution, Take 1 bottle by mouth Every 12 (Twelve) Hours., Disp: 2 bottle, Rfl: 0  •  ticagrelor (BRILINTA) 90 MG tablet tablet, Take 90 mg by mouth 2 (Two) Times a Day., Disp: , Rfl:     6.   Allergies   Allergen Reactions   • Plavix [Clopidogrel Bisulfate] Other (See Comments)     bruises  "                    -----------------------------------------------PHYSICAL EXAM----------------------------------------------    Vital Signs: /80   Ht 170.2 cm (67.01\")   Wt 84.8 kg (187 lb)   BMI 29.28 kg/m²    Flowsheet Rows      First Filed Value   Admission Height  170.2 cm (67.01\") Documented at 04/30/2019 1541   Admission Weight  84.8 kg (187 lb) Documented at 04/30/2019 1541          Physical Exam   Constitutional: She is oriented to person, place, and time. She appears well-developed and well-nourished. No distress.   HENT:   Head: Normocephalic and atraumatic.   Eyes: EOM are normal.   Neck: Normal range of motion.   Cardiovascular: Normal rate.   Pulmonary/Chest: Effort normal.   Abdominal: Soft. She exhibits no distension and no mass. There is no tenderness. There is no guarding.   Genitourinary: Vagina normal and uterus normal.   Genitourinary Comments: cx wnl, pap done   Musculoskeletal: Normal range of motion.   Neurological: She is alert and oriented to person, place, and time.   Skin: Skin is warm and dry. No rash noted. She is not diaphoretic. No erythema. No pallor.   Breast exam declined   Psychiatric: She has a normal mood and affect. Her behavior is normal. Judgment and thought content normal.   Nursing note and vitals reviewed.                          -----------------------------------------------MEDICAL DECISION MAKING-----------------------------  Risk counseling done:  no    Results Reviewed:     1.   Lab Results (last 24 hours)     ** No results found for the last 24 hours. **        2.   Imaging Results (last 24 hours)     ** No results found for the last 24 hours. **        3.   ECG/EMG Results (most recent)     None          Old records reviewed?  Not available    Diagnoses and/or chronic conditions reviewed with pt:  Destiny was seen today for follow-up.    Diagnoses and all orders for this visit:    History of abnormal cervical Pap smear        IMP:  Possible hx abnormal pap. "      PLAN: call for results and proceed accordingly.     Labs/imaging ordered: pap    RTO Return if symptoms worsen or fail to improve.        Lucas Damon MD  4:22 PM  04/30/19

## 2019-05-03 LAB
CYTOLOGIST CVX/VAG CYTO: ABNORMAL
CYTOLOGY CVX/VAG DOC THIN PREP: ABNORMAL
DX ICD CODE: ABNORMAL
HIV 1 & 2 AB SER-IMP: ABNORMAL
HPV I/H RISK 1 DNA CVX QL PROBE+SIG AMP: POSITIVE
OTHER STN SPEC: ABNORMAL
PATH REPORT.FINAL DX SPEC: ABNORMAL
STAT OF ADQ CVX/VAG CYTO-IMP: ABNORMAL

## 2019-05-06 ENCOUNTER — TELEPHONE (OUTPATIENT)
Dept: OBSTETRICS AND GYNECOLOGY | Facility: CLINIC | Age: 64
End: 2019-05-06

## 2019-05-06 PROBLEM — B97.7 HPV IN FEMALE: Status: ACTIVE | Noted: 2019-05-06

## 2019-05-07 ENCOUNTER — ANESTHESIA EVENT (OUTPATIENT)
Dept: PERIOP | Facility: HOSPITAL | Age: 64
End: 2019-05-07

## 2019-05-07 ENCOUNTER — TELEPHONE (OUTPATIENT)
Dept: GASTROENTEROLOGY | Facility: CLINIC | Age: 64
End: 2019-05-07

## 2019-05-07 NOTE — TELEPHONE ENCOUNTER
Patient took her first bottle of Suprep and for got to add water to it before drinking. She did drink at least 16 oz of water after. Pharmacist could not find anything about what to do if patient did not mix with water.  I spoke with Dr. Cedeno and she advised the patient to increase her water intake and she should be fine. Patient aware.

## 2019-05-08 ENCOUNTER — ANESTHESIA (OUTPATIENT)
Dept: PERIOP | Facility: HOSPITAL | Age: 64
End: 2019-05-08

## 2019-05-08 ENCOUNTER — HOSPITAL ENCOUNTER (OUTPATIENT)
Facility: HOSPITAL | Age: 64
Setting detail: HOSPITAL OUTPATIENT SURGERY
Discharge: HOME | End: 2019-05-08
Attending: INTERNAL MEDICINE | Admitting: INTERNAL MEDICINE

## 2019-05-08 VITALS
OXYGEN SATURATION: 92 % | SYSTOLIC BLOOD PRESSURE: 135 MMHG | HEART RATE: 69 BPM | DIASTOLIC BLOOD PRESSURE: 90 MMHG | TEMPERATURE: 97.3 F | RESPIRATION RATE: 16 BRPM | WEIGHT: 182.6 LBS | HEIGHT: 67 IN | BODY MASS INDEX: 28.66 KG/M2

## 2019-05-08 DIAGNOSIS — Z86.010 HISTORY OF COLONIC POLYPS: ICD-10-CM

## 2019-05-08 DIAGNOSIS — Z12.11 ENCOUNTER FOR SCREENING FOR MALIGNANT NEOPLASM OF COLON: ICD-10-CM

## 2019-05-08 PROCEDURE — 88305 TISSUE EXAM BY PATHOLOGIST: CPT | Performed by: INTERNAL MEDICINE

## 2019-05-08 PROCEDURE — 45380 COLONOSCOPY AND BIOPSY: CPT | Performed by: INTERNAL MEDICINE

## 2019-05-08 PROCEDURE — 25010000002 PROPOFOL 10 MG/ML EMULSION: Performed by: NURSE ANESTHETIST, CERTIFIED REGISTERED

## 2019-05-08 RX ORDER — MAGNESIUM HYDROXIDE 1200 MG/15ML
LIQUID ORAL AS NEEDED
Status: DISCONTINUED | OUTPATIENT
Start: 2019-05-08 | End: 2019-05-08 | Stop reason: HOSPADM

## 2019-05-08 RX ORDER — SODIUM CHLORIDE, SODIUM LACTATE, POTASSIUM CHLORIDE, CALCIUM CHLORIDE 600; 310; 30; 20 MG/100ML; MG/100ML; MG/100ML; MG/100ML
9 INJECTION, SOLUTION INTRAVENOUS CONTINUOUS
Status: DISCONTINUED | OUTPATIENT
Start: 2019-05-08 | End: 2019-05-08 | Stop reason: HOSPADM

## 2019-05-08 RX ORDER — SODIUM CHLORIDE 0.9 % (FLUSH) 0.9 %
3 SYRINGE (ML) INJECTION EVERY 12 HOURS SCHEDULED
Status: DISCONTINUED | OUTPATIENT
Start: 2019-05-08 | End: 2019-05-08 | Stop reason: HOSPADM

## 2019-05-08 RX ORDER — MEPERIDINE HYDROCHLORIDE 25 MG/ML
12.5 INJECTION INTRAMUSCULAR; INTRAVENOUS; SUBCUTANEOUS
Status: CANCELLED | OUTPATIENT
Start: 2019-05-08 | End: 2019-05-09

## 2019-05-08 RX ORDER — SODIUM CHLORIDE 0.9 % (FLUSH) 0.9 %
1-10 SYRINGE (ML) INJECTION AS NEEDED
Status: DISCONTINUED | OUTPATIENT
Start: 2019-05-08 | End: 2019-05-08 | Stop reason: HOSPADM

## 2019-05-08 RX ORDER — LIDOCAINE HYDROCHLORIDE 10 MG/ML
0.5 INJECTION, SOLUTION EPIDURAL; INFILTRATION; INTRACAUDAL; PERINEURAL ONCE AS NEEDED
Status: COMPLETED | OUTPATIENT
Start: 2019-05-08 | End: 2019-05-08

## 2019-05-08 RX ORDER — PROPOFOL 10 MG/ML
VIAL (ML) INTRAVENOUS AS NEEDED
Status: DISCONTINUED | OUTPATIENT
Start: 2019-05-08 | End: 2019-05-08 | Stop reason: SURG

## 2019-05-08 RX ORDER — SODIUM CHLORIDE, SODIUM LACTATE, POTASSIUM CHLORIDE, CALCIUM CHLORIDE 600; 310; 30; 20 MG/100ML; MG/100ML; MG/100ML; MG/100ML
100 INJECTION, SOLUTION INTRAVENOUS CONTINUOUS
Status: CANCELLED | OUTPATIENT
Start: 2019-05-08

## 2019-05-08 RX ORDER — ONDANSETRON 2 MG/ML
4 INJECTION INTRAMUSCULAR; INTRAVENOUS ONCE AS NEEDED
Status: CANCELLED | OUTPATIENT
Start: 2019-05-08

## 2019-05-08 RX ORDER — SODIUM CHLORIDE 9 MG/ML
40 INJECTION, SOLUTION INTRAVENOUS AS NEEDED
Status: DISCONTINUED | OUTPATIENT
Start: 2019-05-08 | End: 2019-05-08 | Stop reason: HOSPADM

## 2019-05-08 RX ORDER — LIDOCAINE HYDROCHLORIDE 20 MG/ML
INJECTION, SOLUTION INFILTRATION; PERINEURAL AS NEEDED
Status: DISCONTINUED | OUTPATIENT
Start: 2019-05-08 | End: 2019-05-08 | Stop reason: SURG

## 2019-05-08 RX ADMIN — PROPOFOL 50 MG: 10 INJECTION, EMULSION INTRAVENOUS at 09:00

## 2019-05-08 RX ADMIN — LIDOCAINE HYDROCHLORIDE 0.2 ML: 10 INJECTION, SOLUTION EPIDURAL; INFILTRATION; INTRACAUDAL; PERINEURAL at 08:10

## 2019-05-08 RX ADMIN — PROPOFOL 50 MG: 10 INJECTION, EMULSION INTRAVENOUS at 09:04

## 2019-05-08 RX ADMIN — LIDOCAINE HYDROCHLORIDE 100 MG: 20 INJECTION, SOLUTION INFILTRATION; PERINEURAL at 08:51

## 2019-05-08 RX ADMIN — PROPOFOL 50 MG: 10 INJECTION, EMULSION INTRAVENOUS at 09:09

## 2019-05-08 RX ADMIN — PROPOFOL 50 MG: 10 INJECTION, EMULSION INTRAVENOUS at 09:07

## 2019-05-08 RX ADMIN — PROPOFOL 50 MG: 10 INJECTION, EMULSION INTRAVENOUS at 08:55

## 2019-05-08 RX ADMIN — PROPOFOL 50 MG: 10 INJECTION, EMULSION INTRAVENOUS at 09:25

## 2019-05-08 RX ADMIN — PROPOFOL 50 MG: 10 INJECTION, EMULSION INTRAVENOUS at 09:13

## 2019-05-08 RX ADMIN — PROPOFOL 50 MG: 10 INJECTION, EMULSION INTRAVENOUS at 09:16

## 2019-05-08 RX ADMIN — PROPOFOL 50 MG: 10 INJECTION, EMULSION INTRAVENOUS at 08:51

## 2019-05-08 RX ADMIN — PROPOFOL 50 MG: 10 INJECTION, EMULSION INTRAVENOUS at 09:21

## 2019-05-08 RX ADMIN — SODIUM CHLORIDE, POTASSIUM CHLORIDE, SODIUM LACTATE AND CALCIUM CHLORIDE 9 ML/HR: 600; 310; 30; 20 INJECTION, SOLUTION INTRAVENOUS at 08:10

## 2019-05-08 NOTE — OP NOTE
Patient Name:  Destiny Catherine  YOB: 1955    Date of Procedure:  5/8/2019    Procedure Performed:colonoscopy     Indications:  History of polyps    Pre-op Diagnosis:   Encounter for screening for malignant neoplasm of colon [Z12.11]  History of colonic polyps [Z86.010]    Post-Op Diagnosis Codes:     * Encounter for screening for malignant neoplasm of colon [Z12.11]     * History of colonic polyps [Z86.010]         Staff:  Surgeon(s):  Deborah Cedeno MD         Consent: Procedure colonoscopy indications, risks and procedure were discussed with the patient, including but not limited to, bleeding, infection, possibility of perforation and possible polypectomy. All of the patient's questions were answered, and signed informed consent was obtained and placed on the chart.      Sedation: Sedation was provided by anesthesia.      Estimated Blood Loss: minimal    Specimens:   ID Type Source Tests Collected by Time   A : X4 Polyp Large Intestine, Right / Ascending Colon TISSUE PATHOLOGY EXAM Deborah Cedeno MD 5/8/2019 0904   B :  Polyp Large Intestine, Rectum TISSUE PATHOLOGY EXAM Deborah Cedeno MD 5/8/2019 0927         Description of Procedure:   After excellent sedation a digital rectal examination was performed and a flexible colonoscope was inserted into the rectum passed to the cecum.  The cecum was identified by both the ileocecal valve and the appendiceal orifice.  The overall preparation was fair.  Terminal ileum was entered this appeared normal.  Upon withdrawal scope careful examination mucosa was made.  Pertinent findings include a total of 4 a sending colon polyps that were between 4 to 5 mm sessile removed using forceps.  Single rectal polyp that was 4 mm sessile removed using forceps.  The scope was then carefully withdrawn to the rectum and retroflexed were internal hemorrhoids are noted.  The scope was then straightened and withdrawn out the patient with no immediate complications and she  tolerated the procedure well.     Withdrawal time: 27 minutes    Quality of bowel preparation: Fair to good    Findings:   Colon polyps 5 total removed using forceps  Internal hemorrhoids  We will await biopsy results and repeat colonoscopy will likely be recommended for 3 years    Complications: None        Deborah Cedeno MD     Date: 5/8/2019  Time: 9:38 AM

## 2019-05-08 NOTE — ANESTHESIA POSTPROCEDURE EVALUATION
Patient: Destiny Catherine    Procedure Summary     Date:  05/08/19 Room / Location:  [facility] ENDOSCOPY 2 /  [facility] OR    Anesthesia Start:  0847 Anesthesia Stop:  0931    Procedure:  COLONOSCOPY; POLYPECTOMY (N/A ) Diagnosis:       Encounter for screening for malignant neoplasm of colon      History of colonic polyps      (Encounter for screening for malignant neoplasm of colon [Z12.11])      (History of colonic polyps [Z86.010])    Surgeon:  Deborah Cedeno MD Provider:  Raz Yee CRNA    Anesthesia Type:  MAC ASA Status:  3          Anesthesia Type: MAC  Last vitals  BP   122/97 (05/08/19 1005)   Temp   97.3 °F (36.3 °C) (05/08/19 0934)   Pulse   71 (05/08/19 1005)   Resp   16 (05/08/19 1005)     SpO2   94 % (05/08/19 1005)     Post Anesthesia Care and Evaluation    Patient location during evaluation: PHASE II  Patient participation: complete - patient participated  Level of consciousness: awake and alert  Pain score: 0  Pain management: adequate  Airway patency: patent  Anesthetic complications: No anesthetic complications  PONV Status: none  Cardiovascular status: acceptable  Respiratory status: acceptable  Hydration status: acceptable

## 2019-05-08 NOTE — H&P
PATIENT INFORMATION   Destiny Catherine    - 1955   CHIEF COMPLAINT        Chief Complaint   Patient presents with   • Colonoscopy     due for c/s     HISTORY OF PRESENT ILLNESS   HPI   She is here to discuss cls. Last one was in , and 24 polyps removed. She was due several years ago. She has had cva and diagnosed with aneurysm and maintained on asa and brillinta.   She denies any changes in bowel habits or blood in the stool. bm are daily.   gerd controlled on nexium. She has some issues with breakthrough symptoms but does not take any other.   REVIEW OF SYSTEMS   Review of Systems   Respiratory: Positive for cough and wheezing.   Endocrine: Positive for polyuria.     ACTIVE PROBLEMS       Patient Active Problem List    Diagnosis   • Localized edema [R60.0]   • Anemia [D64.9]   • Acute on chronic respiratory failure with hypoxia (CMS/HCC) [J96.21]   • UTI (urinary tract infection) [N39.0]   • Leukocytosis [D72.829]   • HTN (hypertension) [I10]   • Epilepsy (CMS/HCC) [G40.909]   • Atrial fibrillation (CMS/HCC) [I48.91]   • Chronic diastolic heart failure (CMS/HCC) [I50.32]   • Hypertension [I10]   • Pulmonary hypertension (CMS/HCC) [I27.20]   • Nonruptured cerebral aneurysm [I67.1]   • Coronary artery disease involving native coronary artery of native heart without angina pectoris [I25.10]   • History of renal stent [Z98.890]   • Tobacco abuse [Z72.0]   • History of myocardial infarction in adulthood [I25.2]     PAST MEDICAL HISTORY   Medical History        Past Medical History:   Diagnosis Date   • Acute and chronic respiratory failure with hypoxia (CMS/HCC)    • Acute on chronic diastolic heart failure (CMS/HCC)    • Anemia    • Atrial fibrillation (CMS/HCC)    • CAD (coronary artery disease)    • Chronic diastolic heart failure (CMS/HCC) 2017   • Colon polyp    • Epilepsy (CMS/HCC)    • History of renal stent 2017   • Hyperlipidemia    • Hypertension    • Leukocytosis    • Malignant neoplasm of  anus (CMS/[facility]) 4/11/2018   • Myocardial infarction (CMS/[facility]) 2000   • Nonruptured cerebral aneurysm    • Pulmonary hypertension (CMS/[facility])    • Stroke (CMS/[facility])     2009 and January 2010- [facility]   • Tobacco abuse    • UTI (urinary tract infection)      SURGICAL HISTORY   Surgical History         Past Surgical History:   Procedure Laterality Date   • APPENDECTOMY     • CEREBRAL ANGIOGRAM      stent placement at [facility]   • CHOLECYSTECTOMY     • CORONARY STENT PLACEMENT     • TOTAL HIP ARTHROPLASTY       FAMILY HISTORY         Family History   Problem Relation Age of Onset   • Hypertension Mother    • Lung cancer Mother    • Heart disease Father    • Stroke Father    • Colon cancer Neg Hx    • Colon polyps Neg Hx      SOCIAL HISTORY   Social History           Occupational History   • Not on file   Tobacco Use   • Smoking status: Former Smoker     Types: Electronic Cigarette   • Smokeless tobacco: Never Used   • Tobacco comment: caffine use   Substance and Sexual Activity   • Alcohol use: No   • Drug use: No   • Sexual activity: Not on file     Debilities/Disabilities Identified: None   Emotional Behavior: Appropriate   CURRENT MEDICATIONS     Current Outpatient Medications:   • amLODIPine (NORVASC) 10 MG tablet, Take 10 mg by mouth Daily., Disp: , Rfl:   • aspirin 81 MG EC tablet, Take 81 mg by mouth Daily., Disp: , Rfl:   • carBAMazepine (TEGretol) 200 MG tablet, Take 200 mg by mouth 2 (Two) Times a Day., Disp: , Rfl:   • Ergocalciferol (VITAMIN D2 PO), Take 50,000 tablets by mouth 1 (One) Time Per Week., Disp: , Rfl:   • esomeprazole (nexIUM) 40 MG capsule, Take 40 mg by mouth Every Morning Before Breakfast., Disp: , Rfl:   • furosemide (LASIX) 40 MG tablet, Take 1 tablet by mouth Daily. (Patient taking differently: Take 40 mg by mouth Daily. 60mg daily), Disp: 30 tablet, Rfl: 0   • primidone (MYSOLINE) 250 MG tablet, Take 250 mg by mouth Daily., Disp: , Rfl:   • rosuvastatin (CRESTOR) 40 MG tablet, Take 40 mg by mouth  "Every Night., Disp: , Rfl:   • ticagrelor (BRILINTA) 90 MG tablet tablet, Take 90 mg by mouth 2 (Two) Times a Day., Disp: , Rfl:   • ipratropium-albuterol (COMBIVENT RESPIMAT)  MCG/ACT inhaler, Inhale 1 puff 4 (Four) Times a Day., Disp: 4 g, Rfl: 0   • metoprolol succinate XL (TOPROL-XL) 100 MG 24 hr tablet, Take 100 mg by mouth Daily., Disp: , Rfl:   • nitroglycerin (NITROSTAT) 0.4 MG SL tablet, PLEASE SEE ATTACHED FOR DETAILED DIRECTIONS, Disp: , Rfl: 1   ALLERGIES   Plavix [clopidogrel bisulfate]   VITALS   /93 (BP Location: Left arm, Patient Position: Lying)   Pulse 66   Temp 97.8 °F (36.6 °C) (Oral)   Resp 16   Ht 170.2 cm (67\")   Wt 82.8 kg (182 lb 9.6 oz)   SpO2 95%   BMI 28.60 kg/m²                                     LAST RESULTS           Office Visit on 04/09/2018   Component Date Value Ref Range Status   • Right Common Femoral Spont 04/09/2018 Y   Final   • Right Common Femoral Phasic 04/09/2018 Y   Final   • Right Common Femoral Augment 04/09/2018 Y   Final   • Right Common Femoral Competent 04/09/2018 Y   Final   • Right Common Femoral Compress 04/09/2018 C   Final   • Left Common Femoral Spont 04/09/2018 Y   Final   • Left Common Femoral Phasic 04/09/2018 Y   Final   • Left Common Femoral Augment 04/09/2018 Y   Final   • Left Common Femoral Competent 04/09/2018 Y   Final   • Left Common Femoral Compress 04/09/2018 C   Final   • Left Saphenofemoral Junction Spont 04/09/2018 Y   Final   • Left Saphenofemoral Junction Phasic 04/09/2018 Y   Final   • Left Saphenofemoral Junction Augme* 04/09/2018 Y   Final   • Left Saphenofemoral Junction Compe* 04/09/2018 Y   Final   • Left Saphenofemoral Junction Compr* 04/09/2018 C   Final   • Left Proximal Femoral Compress 04/09/2018 C   Final   • Left Mid Femoral Spont 04/09/2018 Y   Final   • Left Mid Femoral Phasic 04/09/2018 Y   Final   • Left Mid Femoral Augment 04/09/2018 Y   Final   • Left Mid Femoral Competent 04/09/2018 Y   Final   • Left " Mid Femoral Compress 04/09/2018 C   Final   • Left Distal Femoral Compress 04/09/2018 C   Final   • Left Popliteal Spont 04/09/2018 Y   Final   • Left Popliteal Phasic 04/09/2018 Y   Final   • Left Popliteal Augment 04/09/2018 Y   Final   • Left Popliteal Competent 04/09/2018 Y   Final   • Left Popliteal Compress 04/09/2018 C   Final   • Left Posterior Tibial Compress 04/09/2018 C   Final   • Left Peroneal Compress 04/09/2018 C   Final   • Left GastronemiusSoleal Compress 04/09/2018 C   Final   • Left Greater Saph AK Compress 04/09/2018 C   Final   • Left Greater Saph BK Compress 04/09/2018 C   Final   • Left Lesser Saph Compress 04/09/2018 C   Final     No results found.   PHYSICAL EXAM   Physical Exam   Constitutional: She is oriented to person, place, and time. She appears well-developed and well-nourished. No distress.   HENT:   Head: Normocephalic and atraumatic.   Mouth/Throat: Oropharynx is clear and moist.   Eyes: EOM are normal. Pupils are equal, round, and reactive to light.   Neck: Normal range of motion. No tracheal deviation present.   Cardiovascular: Normal rate, regular rhythm, normal heart sounds and intact distal pulses. Exam reveals no gallop and no friction rub.   No murmur heard.   Pulmonary/Chest: Effort normal and breath sounds normal. No stridor. No respiratory distress. She has no wheezes. She has no rales. She exhibits no tenderness.   Abdominal: Soft. Bowel sounds are normal. She exhibits no distension. There is no tenderness. There is no rebound and no guarding.   Musculoskeletal: She exhibits no edema.   Lymphadenopathy:   She has no cervical adenopathy.   Neurological: She is alert and oriented to person, place, and time.   Skin: Skin is warm. She is not diaphoretic.   Psychiatric: She has a normal mood and affect. Her behavior is normal. Judgment and thought content normal.   Nursing note and vitals reviewed.     ASSESSMENT   Diagnoses and all orders for this visit:   Encounter for  screening for malignant neoplasm of colon   - Case Request; Standing   - Case Request   Gastroesophageal reflux disease, esophagitis presence not specified   History of colonic polyps   - Case Request; Standing   - Case Request   Other orders   - nitroglycerin (NITROSTAT) 0.4 MG SL tablet; PLEASE SEE ATTACHED FOR DETAILED DIRECTIONS   - Discontinue: digoxin (LANOXIN) 125 MCG tablet; Take 125 mcg by mouth Daily.   - aspirin 81 MG EC tablet; Take 81 mg by mouth Daily.   - Discontinue: hydrALAZINE (APRESOLINE) 10 MG tablet; Take 10 mg by mouth 3 (Three) Times a Day.   - Follow Anesthesia Guidelines / Standing Orders; Future   - Implement Anesthesia orders day of procedure.; Standing   - Obtain informed consent; Standing     PLAN   No Follow-up on file.   Antireflux measures and dietary modifications reviewed. Low acid diet reviewed. Keep head of bed elevated. Stop eating/drinking at least 3 hours prior to bedtime. Eliminate caffeine and carbonated beverages. Weight loss encouraged if BMI over 25.   Indications, risks and procedure were discussed with the patient, including but not limited to, bleeding, infection, possibility of perforation and possible polypectomy. All of the patient's questions were answered, and signed informed consent was obtained and placed on the chart.   Contact Dr. Green regarding coming off brilinta prior to scope.   Contact Dr. Musa to make sure ok for scope.

## 2019-05-08 NOTE — ANESTHESIA PREPROCEDURE EVALUATION
Anesthesia Evaluation     Patient summary reviewed and Nursing notes reviewed   no history of anesthetic complications:  NPO Solid Status: > 8 hours  NPO Liquid Status: > 8 hours           Airway   Mallampati: II  TM distance: >3 FB  Neck ROM: full  No difficulty expected  Dental - normal exam     Pulmonary    (+) a smoker (Quit 10 years ago. 40+ years smoking.) Former, COPD (Patient denies), sleep apnea (Snores), decreased breath sounds,     ROS comment: Pulmonary HTN  Cardiovascular   Exercise tolerance: poor (<4 METS)    ECG reviewed  Rhythm: irregular  Rate: normal    (+) hypertension 2 medications or greater, valvular problems/murmurs TI, past MI (20 years ago)  >12 months, CAD, cardiac stents dysrhythmias Atrial Fib, CHF, PVD, hyperlipidemia,     ROS comment: EKG 12/5/18:  Comparison: compared with previous ECG   Similar to previous ECG  Rhythm: atrial fibrillation  Rate: normal  Conduction: conduction normal  ST Segments: ST segments normal  T Waves: T waves normal  QRS axis: normal  Other: no other findings  Clinical impression: abnormal ECG    ECHO 3/28/18:  Interpretation Summary     · Calculated right ventricular systolic pressure from tricuspid regurgitation is 73 mmHg.  · Left ventricular systolic function is normal. Estimated EF = 57%.  · Mild tricuspid valve regurgitation is present.  · There is calcification of the aortic valve.  · Left atrial cavity size is moderately dilated.  · Moderately reduced right ventricular systolic function noted.    Neuro/Psych  (+) seizures (> 20 years ago.), CVA (Left sided weakness) residual symptoms,       ROS Comment: Brain aneurysm stented about 3 years ago.  GI/Hepatic/Renal/Endo    (+)  GERD well controlled,      Musculoskeletal (-) negative ROS    Abdominal  - normal exam   Substance History - negative use     OB/GYN negative ob/gyn ROS         Other   (+) blood dyscrasia     ROS/Med Hx Other: Brilinta last on 5/3.  Tegretol taken today.      Phys Exam Other:  Baseline room SaO2 88% on RA.                Anesthesia Plan    ASA 3     MAC     intravenous induction   Anesthetic plan, all risks, benefits, and alternatives have been provided, discussed and informed consent has been obtained with: patient.  Use of blood products discussed with patient  Consented to blood products.

## 2019-05-09 LAB
CYTO UR: NORMAL
LAB AP CASE REPORT: NORMAL
PATH REPORT.FINAL DX SPEC: NORMAL
PATH REPORT.GROSS SPEC: NORMAL

## 2019-12-16 ENCOUNTER — HOSPITAL ENCOUNTER (OUTPATIENT)
Facility: HOSPITAL | Age: 64
Setting detail: OBSERVATION
Discharge: HOME | End: 2019-12-18
Attending: EMERGENCY MEDICINE | Admitting: INTERNAL MEDICINE

## 2019-12-16 ENCOUNTER — APPOINTMENT (OUTPATIENT)
Dept: GENERAL RADIOLOGY | Facility: HOSPITAL | Age: 64
End: 2019-12-16

## 2019-12-16 ENCOUNTER — APPOINTMENT (OUTPATIENT)
Dept: NUCLEAR MEDICINE | Facility: HOSPITAL | Age: 64
End: 2019-12-16

## 2019-12-16 DIAGNOSIS — I50.9 ACUTE CONGESTIVE HEART FAILURE, UNSPECIFIED HEART FAILURE TYPE (HCC): Primary | ICD-10-CM

## 2019-12-16 DIAGNOSIS — J96.21 ACUTE ON CHRONIC RESPIRATORY FAILURE WITH HYPOXIA (HCC): ICD-10-CM

## 2019-12-16 DIAGNOSIS — I50.812 CHRONIC RIGHT-SIDED HEART FAILURE (HCC): ICD-10-CM

## 2019-12-16 DIAGNOSIS — I27.20 PULMONARY HYPERTENSION (HCC): Chronic | ICD-10-CM

## 2019-12-16 DIAGNOSIS — N17.9 ACUTE KIDNEY INJURY (HCC): ICD-10-CM

## 2019-12-16 DIAGNOSIS — R53.1 WEAKNESS: ICD-10-CM

## 2019-12-16 PROBLEM — J96.01 ACUTE RESPIRATORY FAILURE WITH HYPOXIA (HCC): Status: ACTIVE | Noted: 2019-12-16

## 2019-12-16 LAB
ALBUMIN SERPL-MCNC: 3.7 G/DL (ref 3.5–5.2)
ALBUMIN/GLOB SERPL: 0.9 G/DL
ALP SERPL-CCNC: 196 U/L (ref 39–117)
ALT SERPL W P-5'-P-CCNC: 7 U/L (ref 1–33)
ANION GAP SERPL CALCULATED.3IONS-SCNC: 11.9 MMOL/L (ref 5–15)
AST SERPL-CCNC: 13 U/L (ref 1–32)
BASOPHILS # BLD AUTO: 0.05 10*3/MM3 (ref 0–0.2)
BASOPHILS NFR BLD AUTO: 0.6 % (ref 0–1.5)
BILIRUB SERPL-MCNC: 0.3 MG/DL (ref 0.2–1.2)
BUN BLD-MCNC: 19 MG/DL (ref 8–23)
BUN/CREAT SERPL: 13.8 (ref 7–25)
CALCIUM SPEC-SCNC: 8.5 MG/DL (ref 8.6–10.5)
CARBAMAZEPINE SERPL-MCNC: 5.4 MCG/ML (ref 4–12)
CHLORIDE SERPL-SCNC: 101 MMOL/L (ref 98–107)
CO2 SERPL-SCNC: 27.1 MMOL/L (ref 22–29)
CREAT BLD-MCNC: 1.38 MG/DL (ref 0.57–1)
D DIMER PPP FEU-MCNC: 0.66 MCGFEU/ML (ref 0–0.46)
DEPRECATED RDW RBC AUTO: 54.8 FL (ref 37–54)
EOSINOPHIL # BLD AUTO: 0.08 10*3/MM3 (ref 0–0.4)
EOSINOPHIL NFR BLD AUTO: 0.9 % (ref 0.3–6.2)
ERYTHROCYTE [DISTWIDTH] IN BLOOD BY AUTOMATED COUNT: 18.4 % (ref 12.3–15.4)
GFR SERPL CREATININE-BSD FRML MDRD: 38 ML/MIN/1.73
GLOBULIN UR ELPH-MCNC: 4 GM/DL
GLUCOSE BLD-MCNC: 77 MG/DL (ref 65–99)
HCT VFR BLD AUTO: 36.3 % (ref 34–46.6)
HGB BLD-MCNC: 10.8 G/DL (ref 12–15.9)
IMM GRANULOCYTES # BLD AUTO: 0.04 10*3/MM3 (ref 0–0.05)
IMM GRANULOCYTES NFR BLD AUTO: 0.5 % (ref 0–0.5)
LYMPHOCYTES # BLD AUTO: 0.88 10*3/MM3 (ref 0.7–3.1)
LYMPHOCYTES NFR BLD AUTO: 10.1 % (ref 19.6–45.3)
MCH RBC QN AUTO: 24.5 PG (ref 26.6–33)
MCHC RBC AUTO-ENTMCNC: 29.8 G/DL (ref 31.5–35.7)
MCV RBC AUTO: 82.5 FL (ref 79–97)
MONOCYTES # BLD AUTO: 0.91 10*3/MM3 (ref 0.1–0.9)
MONOCYTES NFR BLD AUTO: 10.4 % (ref 5–12)
NEUTROPHILS # BLD AUTO: 6.75 10*3/MM3 (ref 1.7–7)
NEUTROPHILS NFR BLD AUTO: 77.5 % (ref 42.7–76)
NRBC BLD AUTO-RTO: 0 /100 WBC (ref 0–0.2)
NT-PROBNP SERPL-MCNC: ABNORMAL PG/ML (ref 5–900)
PLATELET # BLD AUTO: 224 10*3/MM3 (ref 140–450)
PMV BLD AUTO: 11 FL (ref 6–12)
POTASSIUM BLD-SCNC: 3.2 MMOL/L (ref 3.5–5.2)
PROCALCITONIN SERPL-MCNC: 0.09 NG/ML (ref 0.1–0.25)
PROT SERPL-MCNC: 7.7 G/DL (ref 6–8.5)
RBC # BLD AUTO: 4.4 10*6/MM3 (ref 3.77–5.28)
SODIUM BLD-SCNC: 140 MMOL/L (ref 136–145)
TROPONIN T SERPL-MCNC: <0.01 NG/ML (ref 0–0.03)
WBC NRBC COR # BLD: 8.71 10*3/MM3 (ref 3.4–10.8)

## 2019-12-16 PROCEDURE — A9539 TC99M PENTETATE: HCPCS | Performed by: EMERGENCY MEDICINE

## 2019-12-16 PROCEDURE — 0 TECHNETIUM TC 99M PENTETATE KIT: Performed by: EMERGENCY MEDICINE

## 2019-12-16 PROCEDURE — G0378 HOSPITAL OBSERVATION PER HR: HCPCS

## 2019-12-16 PROCEDURE — 83880 ASSAY OF NATRIURETIC PEPTIDE: CPT | Performed by: EMERGENCY MEDICINE

## 2019-12-16 PROCEDURE — 25010000002 FUROSEMIDE PER 20 MG: Performed by: HOSPITALIST

## 2019-12-16 PROCEDURE — 78582 LUNG VENTILAT&PERFUS IMAGING: CPT

## 2019-12-16 PROCEDURE — 84145 PROCALCITONIN (PCT): CPT | Performed by: EMERGENCY MEDICINE

## 2019-12-16 PROCEDURE — 94799 UNLISTED PULMONARY SVC/PX: CPT

## 2019-12-16 PROCEDURE — 71045 X-RAY EXAM CHEST 1 VIEW: CPT

## 2019-12-16 PROCEDURE — 85025 COMPLETE CBC W/AUTO DIFF WBC: CPT | Performed by: EMERGENCY MEDICINE

## 2019-12-16 PROCEDURE — 84484 ASSAY OF TROPONIN QUANT: CPT | Performed by: EMERGENCY MEDICINE

## 2019-12-16 PROCEDURE — 80053 COMPREHEN METABOLIC PANEL: CPT | Performed by: EMERGENCY MEDICINE

## 2019-12-16 PROCEDURE — 93005 ELECTROCARDIOGRAM TRACING: CPT | Performed by: EMERGENCY MEDICINE

## 2019-12-16 PROCEDURE — 80156 ASSAY CARBAMAZEPINE TOTAL: CPT | Performed by: EMERGENCY MEDICINE

## 2019-12-16 PROCEDURE — 85379 FIBRIN DEGRADATION QUANT: CPT | Performed by: EMERGENCY MEDICINE

## 2019-12-16 PROCEDURE — A9540 TC99M MAA: HCPCS | Performed by: EMERGENCY MEDICINE

## 2019-12-16 PROCEDURE — 96375 TX/PRO/DX INJ NEW DRUG ADDON: CPT

## 2019-12-16 PROCEDURE — 0 TECHNETIUM ALBUMIN AGGREGATED: Performed by: EMERGENCY MEDICINE

## 2019-12-16 PROCEDURE — 99284 EMERGENCY DEPT VISIT MOD MDM: CPT | Performed by: EMERGENCY MEDICINE

## 2019-12-16 PROCEDURE — 93010 ELECTROCARDIOGRAM REPORT: CPT | Performed by: INTERNAL MEDICINE

## 2019-12-16 PROCEDURE — 99219 PR INITIAL OBSERVATION CARE/DAY 50 MINUTES: CPT | Performed by: HOSPITALIST

## 2019-12-16 PROCEDURE — 99284 EMERGENCY DEPT VISIT MOD MDM: CPT

## 2019-12-16 RX ORDER — CARBAMAZEPINE 200 MG/1
200 TABLET ORAL 2 TIMES DAILY
Status: DISCONTINUED | OUTPATIENT
Start: 2019-12-17 | End: 2019-12-18 | Stop reason: HOSPADM

## 2019-12-16 RX ORDER — PANTOPRAZOLE SODIUM 40 MG/1
40 TABLET, DELAYED RELEASE ORAL EVERY MORNING
Status: DISCONTINUED | OUTPATIENT
Start: 2019-12-17 | End: 2019-12-18 | Stop reason: HOSPADM

## 2019-12-16 RX ORDER — NYSTATIN 100000 [USP'U]/G
POWDER TOPICAL EVERY 12 HOURS SCHEDULED
Status: DISCONTINUED | OUTPATIENT
Start: 2019-12-16 | End: 2019-12-18 | Stop reason: HOSPADM

## 2019-12-16 RX ORDER — ACETAMINOPHEN 325 MG/1
650 TABLET ORAL EVERY 6 HOURS PRN
Status: DISCONTINUED | OUTPATIENT
Start: 2019-12-16 | End: 2019-12-18 | Stop reason: HOSPADM

## 2019-12-16 RX ORDER — PRIMIDONE 250 MG/1
250 TABLET ORAL DAILY
Status: DISCONTINUED | OUTPATIENT
Start: 2019-12-17 | End: 2019-12-18 | Stop reason: HOSPADM

## 2019-12-16 RX ORDER — DOCUSATE SODIUM 100 MG/1
100 CAPSULE, LIQUID FILLED ORAL 2 TIMES DAILY PRN
Status: DISCONTINUED | OUTPATIENT
Start: 2019-12-16 | End: 2019-12-18 | Stop reason: HOSPADM

## 2019-12-16 RX ORDER — METOPROLOL SUCCINATE 50 MG/1
100 TABLET, EXTENDED RELEASE ORAL DAILY
Status: DISCONTINUED | OUTPATIENT
Start: 2019-12-17 | End: 2019-12-18 | Stop reason: HOSPADM

## 2019-12-16 RX ORDER — ROSUVASTATIN CALCIUM 5 MG/1
40 TABLET, COATED ORAL NIGHTLY
Status: DISCONTINUED | OUTPATIENT
Start: 2019-12-17 | End: 2019-12-18 | Stop reason: HOSPADM

## 2019-12-16 RX ORDER — IPRATROPIUM BROMIDE AND ALBUTEROL SULFATE 2.5; .5 MG/3ML; MG/3ML
3 SOLUTION RESPIRATORY (INHALATION)
Status: DISCONTINUED | OUTPATIENT
Start: 2019-12-17 | End: 2019-12-18 | Stop reason: HOSPADM

## 2019-12-16 RX ORDER — FUROSEMIDE 10 MG/ML
80 INJECTION INTRAMUSCULAR; INTRAVENOUS ONCE
Status: COMPLETED | OUTPATIENT
Start: 2019-12-16 | End: 2019-12-16

## 2019-12-16 RX ADMIN — NYSTATIN: 100000 POWDER TOPICAL at 22:19

## 2019-12-16 RX ADMIN — Medication 1 DOSE: at 19:05

## 2019-12-16 RX ADMIN — KIT FOR THE PREPARATION OF TECHNETIUM TC 99M PENTETATE 1 DOSE: 20 INJECTION, POWDER, LYOPHILIZED, FOR SOLUTION INTRAVENOUS; RESPIRATORY (INHALATION) at 19:05

## 2019-12-16 RX ADMIN — FUROSEMIDE 80 MG: 10 INJECTION, SOLUTION INTRAMUSCULAR; INTRAVENOUS at 22:19

## 2019-12-16 NOTE — ED NOTES
Pt cont. To ask for water or ice chips informed pt multiple times is Rubia Rodriguez, [place]  12/16/19 [id number]

## 2019-12-16 NOTE — ED NOTES
Pt in continent again in bed. Placed linens and chucks on bed. Pt waiting to go to [facility]     Rubia Marley RN  12/16/19 1179

## 2019-12-16 NOTE — ED NOTES
Pt up to BSC with assistance. Pt incontinent of urine. Pt had no urine in BSC. Changed bed linens and assisted pt into bed.     Rubia Marley RN  12/16/19 [id number]

## 2019-12-16 NOTE — ED NOTES
Pt to radiology for VQ scan. Transported by House supervisor on telemetry box     Rubia Marley RN  12/16/19 0553

## 2019-12-16 NOTE — ED PROVIDER NOTES
Subjective   History of Present Illness  History of Present Illness    Chief complaint: Short of breath and swelling    Location: Feet, right greater than left    Quality/Severity: Patient gets short of breath when she takes 4-5 steps    Timing/Onset/Duration: For the last several months, worse since Thursday    Modifying Factors: Worse with exertion, better with rest    Associated Symptoms: No headache.  No fever chills or cough.  No sore throat earache or nasal congestion.  No chest pain.  No abdominal pain.  No diarrhea or burning when she urinates.  No nausea or vomiting.    Narrative: This 64-year-old white female with a history of diastolic congestive heart failure, COPD, and coronary artery disease with cerebral aneurysm presents with shortness of breath on exertion.    PCP:  [facility]      Review of Systems   Constitutional: Negative for chills and fever.   HENT: Negative for ear pain and sore throat.    Respiratory: Positive for shortness of breath. Negative for cough and chest tightness.    Cardiovascular: Positive for leg swelling. Negative for chest pain and palpitations.   Gastrointestinal: Negative for abdominal pain, diarrhea, nausea and vomiting.   Genitourinary: Negative for dysuria.   Musculoskeletal: Negative for back pain.   Skin: Negative for rash.   Neurological: Negative for headaches.   Psychiatric/Behavioral: Negative.  Negative for confusion.        Medication List      ASK your doctor about these medications    BRILINTA 90 MG tablet tablet  Generic drug:  ticagrelor     carBAMazepine 200 MG tablet  Commonly known as:  TEGretol     CRESTOR 40 MG tablet  Generic drug:  rosuvastatin     esomeprazole 40 MG capsule  Commonly known as:  nexIUM     furosemide 40 MG tablet  Commonly known as:  LASIX  Take 1 tablet by mouth Daily.     ipratropium-albuterol  MCG/ACT inhaler  Commonly known as:  COMBIVENT RESPIMAT  Inhale 1 puff 4 (Four) Times a Day.     metoprolol succinate  MG 24 hr  tablet  Commonly known as:  TOPROL-XL     nitroglycerin 0.4 MG SL tablet  Commonly known as:  NITROSTAT     primidone 250 MG tablet  Commonly known as:  MYSOLINE     VITAMIN D2 PO          Past Medical History:   Diagnosis Date   • Acute and chronic respiratory failure with hypoxia (CMS/HCC)    • Acute on chronic diastolic heart failure (CMS/HCC)    • Anemia    • Atrial fibrillation (CMS/HCC)    • CAD (coronary artery disease)    • Chronic diastolic heart failure (CMS/HCC) 4/27/2017   • Colon polyp    • COPD (chronic obstructive pulmonary disease) (CMS/HCC)    • Epilepsy (CMS/HCC)    • History of renal stent 4/27/2017   • Hyperlipidemia    • Hypertension    • Leukocytosis    • Malignant neoplasm of anus (CMS/HCC) 4/11/2018   • Myocardial infarction (CMS/HCC) 2000   • Nonruptured cerebral aneurysm    • Pulmonary hypertension (CMS/HCC)    • Stroke (CMS/HCC)     2009 and January 2010- [facility]   • Tobacco abuse    • UTI (urinary tract infection)        Allergies   Allergen Reactions   • Plavix [Clopidogrel Bisulfate] Other (See Comments)     bruises   • Keflex [Cephalexin] Rash       Past Surgical History:   Procedure Laterality Date   • APPENDECTOMY     • CEREBRAL ANGIOGRAM      stent placement at [facility]   • CHOLECYSTECTOMY     • COLONOSCOPY N/A 5/8/2019    Procedure: COLONOSCOPY; POLYPECTOMY;  Surgeon: Deborah Cedeno MD;  Location: [facility];  Service: Gastroenterology   • CORONARY STENT PLACEMENT     • TOTAL HIP ARTHROPLASTY         Family History   Problem Relation Age of Onset   • Hypertension Mother    • Lung cancer Mother    • Heart disease Father    • Stroke Father    • Colon cancer Neg Hx    • Colon polyps Neg Hx        Social History     Socioeconomic History   • Marital status:      Spouse name: Not on file   • Number of children: Not on file   • Years of education: Not on file   • Highest education level: Not on file   Tobacco Use   • Smoking status: Former Smoker     Types: Electronic Cigarette   •  Smokeless tobacco: Never Used   • Tobacco comment: caffine use   Substance and Sexual Activity   • Alcohol use: No   • Drug use: No   • Sexual activity: Defer           Objective   Physical Exam   Constitutional: She is oriented to person, place, and time. She appears well-developed and well-nourished. No distress.   ED Triage Vitals (12/16/19 1507)  Temp: 97.8 °F (36.6 °C)  Heart Rate: 77  Resp: (!) 30  BP: 131/77  SpO2: (!) 83 %  Temp src: Oral  Heart Rate Source: Monitor  Patient Position: Lying  BP Location: Left arm  FiO2 (%): n/a    The patient's vitals were reviewed by me.  Unless otherwise noted they are within normal limits.     HENT:   Head: Normocephalic and atraumatic.   Right Ear: External ear normal.   Left Ear: External ear normal.   Nose: Nose normal.   Mouth/Throat: Oropharynx is clear and moist. No oropharyngeal exudate or posterior oropharyngeal edema.   Eyes: Pupils are equal, round, and reactive to light. Conjunctivae and EOM are normal. Right eye exhibits no discharge. Left eye exhibits no discharge.   Neck: Normal range of motion. Neck supple. No JVD present. No tracheal deviation present. No thyromegaly present.   Cardiovascular: Normal rate, regular rhythm, normal heart sounds and intact distal pulses. Exam reveals no gallop, no friction rub and no decreased pulses.   No murmur heard.  Pulmonary/Chest: Effort normal and breath sounds normal. No stridor. No respiratory distress. She has no wheezes. She has no rales. She exhibits no tenderness.   Abdominal: Soft. Bowel sounds are normal. She exhibits no distension and no mass. There is no tenderness. There is no rebound and no guarding. No hernia.   Musculoskeletal: Normal range of motion. She exhibits no deformity.        Right lower leg: She exhibits edema (2+ pitting). She exhibits no tenderness.        Left lower leg: She exhibits edema (1+ pitting). She exhibits no tenderness.   Lymphadenopathy:     She has no cervical adenopathy.    Neurological: She is alert and oriented to person, place, and time.   Skin: Skin is warm and dry. No rash noted. She is not diaphoretic. No erythema. No pallor.   Psychiatric: Her behavior is normal.   Nursing note and vitals reviewed.      Procedures           ED Course  ED Course as of Dec 16 1703   Mon Dec 16, 2019   1700 The laboratory values were reviewed by me.  The proBNP is 10,192.  The hemoglobin is 10.8.  The relative neutrophil percent is 77%.  The d-dimer is 0.66.  The creatinine is 1.38.  The potassium is 3.2.  The calcium is 8.5.  The alkaline phosphatase is 196.  The GFR is 38.  The laboratory values are otherwise unremarkable.    [RC]   1702 Results for ROBERTO LINCOLN (MRN [id number]) as of 12/16/2019 17:01    3/30/2018 04:19  proBNP: 2,125.0 (H)  Glucose: 89  Sodium: 132 (L)  Potassium: 4.0  CO2: 27.4  Chloride: 91 (L)  Anion Gap: 13.6  Creatinine: 0.94  BUN: 21  BUN/Creatinine Ratio: 22.3  Calcium: 8.6  eGFR Non African Am: 60 (L)  WBC: 7.43  RBC: 3.40 (L)  Hemoglobin: 9.3 (L)  Hematocrit: 29.8 (L)  RDW: 14.5 (H)  MCV: 87.6  MCH: 27.4  MCHC: 31.2 (L)  MPV: 11.1  Platelets: 166  RDW-SD: 46.7      [RC]      ED Course User Index  [RC] Gelacio Carrera MD      3:58 PM, 12/16/19:  EKG was obtained at 1536 and read by me at 1530.  EKG shows atrial fibrillation with rate 72.  There is a normal axis with no hypertrophy.  The QRS and QT intervals are unremarkable.  There is no acute ST elevation or depression.     5:45 PM, 12/16/19:  The patient was reassessed.  She has no new complaints.  Her vital signs were reviewed and are stable.  Lung exam: Bilateral rales.    5:45 PM, 12/16/19:  Patient's diagnosis of acute exacerbation of CHF with BAYLEE was discussed with her.  The patient will be admitted, receive her VQ scan, have correction of her BAYLEE and diuresed for her CHF.  Patient's questions were answered she will be brought in for observation in fair condition.    5:46 PM, 12/16/19:  I spoke with   Elmer, on-call for the hospitalist, she will bring the patient in for observation.           No data recorded                        MDM    Final diagnoses:   Acute congestive heart failure, unspecified heart failure type (CMS/HCC)   Acute kidney injury (CMS/HCC)              Gelacio Carrera MD  12/16/19 5622

## 2019-12-16 NOTE — ED NOTES
Pt placed on 2L N/C informed to breath through her nose and not her mouth     Rubia Marley RN  12/16/19 7498

## 2019-12-17 ENCOUNTER — APPOINTMENT (OUTPATIENT)
Dept: CARDIOLOGY | Facility: HOSPITAL | Age: 64
End: 2019-12-17

## 2019-12-17 ENCOUNTER — APPOINTMENT (OUTPATIENT)
Dept: ULTRASOUND IMAGING | Facility: HOSPITAL | Age: 64
End: 2019-12-17

## 2019-12-17 PROBLEM — J96.21 ACUTE ON CHRONIC RESPIRATORY FAILURE WITH HYPOXIA (HCC): Status: ACTIVE | Noted: 2019-12-17

## 2019-12-17 PROBLEM — R60.0 LOCALIZED EDEMA: Status: RESOLVED | Noted: 2018-04-11 | Resolved: 2019-12-17

## 2019-12-17 PROBLEM — I50.812 CHRONIC RIGHT-SIDED HEART FAILURE (HCC): Status: ACTIVE | Noted: 2019-12-17

## 2019-12-17 PROBLEM — D72.829 LEUKOCYTOSIS: Status: RESOLVED | Noted: 2018-03-28 | Resolved: 2019-12-17

## 2019-12-17 PROBLEM — D64.9 ANEMIA: Status: RESOLVED | Noted: 2018-03-30 | Resolved: 2019-12-17

## 2019-12-17 PROBLEM — Z12.11 ENCOUNTER FOR SCREENING FOR MALIGNANT NEOPLASM OF COLON: Status: RESOLVED | Noted: 2019-04-11 | Resolved: 2019-12-17

## 2019-12-17 PROBLEM — N39.0 UTI (URINARY TRACT INFECTION): Status: RESOLVED | Noted: 2018-03-28 | Resolved: 2019-12-17

## 2019-12-17 LAB
ALBUMIN SERPL-MCNC: 3.2 G/DL (ref 3.5–5.2)
ALBUMIN/GLOB SERPL: 0.9 G/DL
ALP SERPL-CCNC: 160 U/L (ref 39–117)
ALT SERPL W P-5'-P-CCNC: 7 U/L (ref 1–33)
ANION GAP SERPL CALCULATED.3IONS-SCNC: 13.1 MMOL/L (ref 5–15)
AST SERPL-CCNC: 13 U/L (ref 1–32)
BACTERIA UR QL AUTO: ABNORMAL /HPF
BASOPHILS # BLD AUTO: 0.06 10*3/MM3 (ref 0–0.2)
BASOPHILS NFR BLD AUTO: 0.8 % (ref 0–1.5)
BH CV ECHO MEAS - ACS: 1.8 CM
BH CV ECHO MEAS - AO MAX PG (FULL): 2.6 MMHG
BH CV ECHO MEAS - AO MAX PG: 6.6 MMHG
BH CV ECHO MEAS - AO MEAN PG (FULL): 2 MMHG
BH CV ECHO MEAS - AO MEAN PG: 4 MMHG
BH CV ECHO MEAS - AO ROOT AREA (BSA CORRECTED): 1.6
BH CV ECHO MEAS - AO ROOT AREA: 6.6 CM^2
BH CV ECHO MEAS - AO ROOT DIAM: 2.9 CM
BH CV ECHO MEAS - AO V2 MAX: 128 CM/SEC
BH CV ECHO MEAS - AO V2 MEAN: 90.8 CM/SEC
BH CV ECHO MEAS - AO V2 VTI: 24.1 CM
BH CV ECHO MEAS - AVA(I,A): 2.2 CM^2
BH CV ECHO MEAS - AVA(I,D): 2.2 CM^2
BH CV ECHO MEAS - AVA(V,A): 2.4 CM^2
BH CV ECHO MEAS - AVA(V,D): 2.4 CM^2
BH CV ECHO MEAS - BSA(HAYCOCK): 1.9 M^2
BH CV ECHO MEAS - BSA: 1.8 M^2
BH CV ECHO MEAS - BZI_BMI: 28.1 KILOGRAMS/M^2
BH CV ECHO MEAS - BZI_METRIC_HEIGHT: 165.1 CM
BH CV ECHO MEAS - BZI_METRIC_WEIGHT: 76.7 KG
BH CV ECHO MEAS - EDV(CUBED): 74.1 ML
BH CV ECHO MEAS - EDV(MOD-SP2): 63.2 ML
BH CV ECHO MEAS - EDV(MOD-SP4): 52.7 ML
BH CV ECHO MEAS - EDV(TEICH): 78.6 ML
BH CV ECHO MEAS - EF(CUBED): 77.6 %
BH CV ECHO MEAS - EF(MOD-BP): 80 %
BH CV ECHO MEAS - EF(MOD-SP2): 75.5 %
BH CV ECHO MEAS - EF(MOD-SP4): 85.5 %
BH CV ECHO MEAS - EF(TEICH): 70.2 %
BH CV ECHO MEAS - ESV(CUBED): 16.6 ML
BH CV ECHO MEAS - ESV(MOD-SP2): 15.5 ML
BH CV ECHO MEAS - ESV(MOD-SP4): 7.7 ML
BH CV ECHO MEAS - ESV(TEICH): 23.4 ML
BH CV ECHO MEAS - FS: 39.3 %
BH CV ECHO MEAS - IVS/LVPW: 1.2
BH CV ECHO MEAS - IVSD: 1.3 CM
BH CV ECHO MEAS - LAT PEAK E' VEL: 9 CM/SEC
BH CV ECHO MEAS - LV DIASTOLIC VOL/BSA (35-75): 28.6 ML/M^2
BH CV ECHO MEAS - LV MASS(C)D: 180.3 GRAMS
BH CV ECHO MEAS - LV MASS(C)DI: 97.9 GRAMS/M^2
BH CV ECHO MEAS - LV MAX PG: 4 MMHG
BH CV ECHO MEAS - LV MEAN PG: 2 MMHG
BH CV ECHO MEAS - LV SYSTOLIC VOL/BSA (12-30): 4.2 ML/M^2
BH CV ECHO MEAS - LV V1 MAX: 99.8 CM/SEC
BH CV ECHO MEAS - LV V1 MEAN: 62.3 CM/SEC
BH CV ECHO MEAS - LV V1 VTI: 16.8 CM
BH CV ECHO MEAS - LVIDD: 4.2 CM
BH CV ECHO MEAS - LVIDS: 2.6 CM
BH CV ECHO MEAS - LVLD AP2: 6.7 CM
BH CV ECHO MEAS - LVLD AP4: 6.6 CM
BH CV ECHO MEAS - LVLS AP2: 5 CM
BH CV ECHO MEAS - LVLS AP4: 4.8 CM
BH CV ECHO MEAS - LVOT AREA (M): 3.1 CM^2
BH CV ECHO MEAS - LVOT AREA: 3.1 CM^2
BH CV ECHO MEAS - LVOT DIAM: 2 CM
BH CV ECHO MEAS - LVPWD: 1.1 CM
BH CV ECHO MEAS - MED PEAK E' VEL: 7 CM/SEC
BH CV ECHO MEAS - MV DEC SLOPE: 663 CM/SEC^2
BH CV ECHO MEAS - MV DEC TIME: 153 SEC
BH CV ECHO MEAS - MV E MAX VEL: 116 CM/SEC
BH CV ECHO MEAS - MV MAX PG: 5.3 MMHG
BH CV ECHO MEAS - MV MEAN PG: 1 MMHG
BH CV ECHO MEAS - MV P1/2T MAX VEL: 124 CM/SEC
BH CV ECHO MEAS - MV P1/2T: 54.8 MSEC
BH CV ECHO MEAS - MV V2 MAX: 115 CM/SEC
BH CV ECHO MEAS - MV V2 MEAN: 51.5 CM/SEC
BH CV ECHO MEAS - MV V2 VTI: 19.1 CM
BH CV ECHO MEAS - MVA P1/2T LCG: 1.8 CM^2
BH CV ECHO MEAS - MVA(P1/2T): 4 CM^2
BH CV ECHO MEAS - MVA(VTI): 2.8 CM^2
BH CV ECHO MEAS - PA ACC TIME: 0.04 SEC
BH CV ECHO MEAS - PA MAX PG (FULL): 3.4 MMHG
BH CV ECHO MEAS - PA MAX PG: 5.4 MMHG
BH CV ECHO MEAS - PA PR(ACCEL): 61 MMHG
BH CV ECHO MEAS - PA V2 MAX: 116 CM/SEC
BH CV ECHO MEAS - PULM DIAS VEL: 53.6 CM/SEC
BH CV ECHO MEAS - PVA(V,A): 1.7 CM^2
BH CV ECHO MEAS - PVA(V,D): 1.7 CM^2
BH CV ECHO MEAS - QP/QS: 0.66
BH CV ECHO MEAS - RAP SYSTOLE: 8 MMHG
BH CV ECHO MEAS - RV MAX PG: 2 MMHG
BH CV ECHO MEAS - RV MEAN PG: 1 MMHG
BH CV ECHO MEAS - RV V1 MAX: 70.2 CM/SEC
BH CV ECHO MEAS - RV V1 MEAN: 42.2 CM/SEC
BH CV ECHO MEAS - RV V1 VTI: 12.2 CM
BH CV ECHO MEAS - RVOT AREA: 2.8 CM^2
BH CV ECHO MEAS - RVOT DIAM: 1.9 CM
BH CV ECHO MEAS - RVSP: 89.4 MMHG
BH CV ECHO MEAS - SI(AO): 86.4 ML/M^2
BH CV ECHO MEAS - SI(CUBED): 31.2 ML/M^2
BH CV ECHO MEAS - SI(LVOT): 28.7 ML/M^2
BH CV ECHO MEAS - SI(MOD-SP2): 25.9 ML/M^2
BH CV ECHO MEAS - SI(MOD-SP4): 24.5 ML/M^2
BH CV ECHO MEAS - SI(TEICH): 29.9 ML/M^2
BH CV ECHO MEAS - SV(AO): 159.2 ML
BH CV ECHO MEAS - SV(CUBED): 57.5 ML
BH CV ECHO MEAS - SV(LVOT): 52.8 ML
BH CV ECHO MEAS - SV(MOD-SP2): 47.7 ML
BH CV ECHO MEAS - SV(MOD-SP4): 45 ML
BH CV ECHO MEAS - SV(RVOT): 34.6 ML
BH CV ECHO MEAS - SV(TEICH): 55.1 ML
BH CV ECHO MEAS - TAPSE (>1.6): 1.1 CM
BH CV ECHO MEAS - TR MAX VEL: 451 CM/SEC
BH CV ECHO MEASUREMENTS AVERAGE E/E' RATIO: 14.5
BH CV VAS BP RIGHT ARM: NORMAL MMHG
BH CV XLRA - RV BASE: 3.7 CM
BH CV XLRA - TDI S': 7 CM/SEC
BILIRUB SERPL-MCNC: 0.3 MG/DL (ref 0.2–1.2)
BILIRUB UR QL STRIP: NEGATIVE
BUN BLD-MCNC: 17 MG/DL (ref 8–23)
BUN/CREAT SERPL: 14 (ref 7–25)
CALCIUM SPEC-SCNC: 8.4 MG/DL (ref 8.6–10.5)
CHLORIDE SERPL-SCNC: 99 MMOL/L (ref 98–107)
CLARITY UR: ABNORMAL
CO2 SERPL-SCNC: 27.9 MMOL/L (ref 22–29)
COLOR UR: YELLOW
CREAT BLD-MCNC: 1.21 MG/DL (ref 0.57–1)
DEPRECATED RDW RBC AUTO: 53.9 FL (ref 37–54)
EOSINOPHIL # BLD AUTO: 0.15 10*3/MM3 (ref 0–0.4)
EOSINOPHIL NFR BLD AUTO: 1.9 % (ref 0.3–6.2)
ERYTHROCYTE [DISTWIDTH] IN BLOOD BY AUTOMATED COUNT: 18.3 % (ref 12.3–15.4)
GFR SERPL CREATININE-BSD FRML MDRD: 45 ML/MIN/1.73
GLOBULIN UR ELPH-MCNC: 3.6 GM/DL
GLUCOSE BLD-MCNC: 94 MG/DL (ref 65–99)
GLUCOSE UR STRIP-MCNC: NEGATIVE MG/DL
HCT VFR BLD AUTO: 33.8 % (ref 34–46.6)
HGB BLD-MCNC: 10 G/DL (ref 12–15.9)
HGB UR QL STRIP.AUTO: ABNORMAL
HYALINE CASTS UR QL AUTO: ABNORMAL /LPF
IMM GRANULOCYTES # BLD AUTO: 0.03 10*3/MM3 (ref 0–0.05)
IMM GRANULOCYTES NFR BLD AUTO: 0.4 % (ref 0–0.5)
KETONES UR QL STRIP: NEGATIVE
LEFT ATRIUM VOLUME INDEX: 40 ML/M2
LEUKOCYTE ESTERASE UR QL STRIP.AUTO: ABNORMAL
LYMPHOCYTES # BLD AUTO: 1.06 10*3/MM3 (ref 0.7–3.1)
LYMPHOCYTES NFR BLD AUTO: 13.5 % (ref 19.6–45.3)
MAGNESIUM SERPL-MCNC: 1.9 MG/DL (ref 1.6–2.4)
MAXIMAL PREDICTED HEART RATE: 156 BPM
MCH RBC QN AUTO: 24.2 PG (ref 26.6–33)
MCHC RBC AUTO-ENTMCNC: 29.6 G/DL (ref 31.5–35.7)
MCV RBC AUTO: 81.8 FL (ref 79–97)
MONOCYTES # BLD AUTO: 0.96 10*3/MM3 (ref 0.1–0.9)
MONOCYTES NFR BLD AUTO: 12.2 % (ref 5–12)
NEUTROPHILS # BLD AUTO: 5.58 10*3/MM3 (ref 1.7–7)
NEUTROPHILS NFR BLD AUTO: 71.2 % (ref 42.7–76)
NITRITE UR QL STRIP: POSITIVE
NRBC BLD AUTO-RTO: 0 /100 WBC (ref 0–0.2)
PH UR STRIP.AUTO: 6 [PH] (ref 4.5–8)
PLATELET # BLD AUTO: 184 10*3/MM3 (ref 140–450)
PMV BLD AUTO: 11.1 FL (ref 6–12)
POTASSIUM BLD-SCNC: 2.8 MMOL/L (ref 3.5–5.2)
PROT SERPL-MCNC: 6.8 G/DL (ref 6–8.5)
PROT UR QL STRIP: ABNORMAL
RBC # BLD AUTO: 4.13 10*6/MM3 (ref 3.77–5.28)
RBC # UR: ABNORMAL /HPF
REF LAB TEST METHOD: ABNORMAL
SODIUM BLD-SCNC: 140 MMOL/L (ref 136–145)
SP GR UR STRIP: 1.02 (ref 1–1.03)
SQUAMOUS #/AREA URNS HPF: ABNORMAL /HPF
STRESS TARGET HR: 133 BPM
TROPONIN T SERPL-MCNC: <0.01 NG/ML (ref 0–0.03)
UROBILINOGEN UR QL STRIP: ABNORMAL
WBC NRBC COR # BLD: 7.84 10*3/MM3 (ref 3.4–10.8)
WBC UR QL AUTO: ABNORMAL /HPF

## 2019-12-17 PROCEDURE — 81001 URINALYSIS AUTO W/SCOPE: CPT | Performed by: NURSE PRACTITIONER

## 2019-12-17 PROCEDURE — 96372 THER/PROPH/DIAG INJ SC/IM: CPT

## 2019-12-17 PROCEDURE — 83735 ASSAY OF MAGNESIUM: CPT | Performed by: HOSPITALIST

## 2019-12-17 PROCEDURE — 87088 URINE BACTERIA CULTURE: CPT | Performed by: NURSE PRACTITIONER

## 2019-12-17 PROCEDURE — 93306 TTE W/DOPPLER COMPLETE: CPT

## 2019-12-17 PROCEDURE — 93306 TTE W/DOPPLER COMPLETE: CPT | Performed by: INTERNAL MEDICINE

## 2019-12-17 PROCEDURE — 87186 SC STD MICRODIL/AGAR DIL: CPT | Performed by: NURSE PRACTITIONER

## 2019-12-17 PROCEDURE — 96365 THER/PROPH/DIAG IV INF INIT: CPT

## 2019-12-17 PROCEDURE — G0378 HOSPITAL OBSERVATION PER HR: HCPCS

## 2019-12-17 PROCEDURE — 25010000002 MAGNESIUM SULFATE 2 GM/50ML SOLUTION: Performed by: NURSE PRACTITIONER

## 2019-12-17 PROCEDURE — 84484 ASSAY OF TROPONIN QUANT: CPT | Performed by: HOSPITALIST

## 2019-12-17 PROCEDURE — 99225 PR SBSQ OBSERVATION CARE/DAY 25 MINUTES: CPT | Performed by: NURSE PRACTITIONER

## 2019-12-17 PROCEDURE — 96366 THER/PROPH/DIAG IV INF ADDON: CPT

## 2019-12-17 PROCEDURE — 85025 COMPLETE CBC W/AUTO DIFF WBC: CPT | Performed by: HOSPITALIST

## 2019-12-17 PROCEDURE — 87086 URINE CULTURE/COLONY COUNT: CPT | Performed by: NURSE PRACTITIONER

## 2019-12-17 PROCEDURE — 80053 COMPREHEN METABOLIC PANEL: CPT | Performed by: HOSPITALIST

## 2019-12-17 PROCEDURE — 94799 UNLISTED PULMONARY SVC/PX: CPT

## 2019-12-17 PROCEDURE — 25010000002 ENOXAPARIN PER 10 MG: Performed by: HOSPITALIST

## 2019-12-17 PROCEDURE — 76775 US EXAM ABDO BACK WALL LIM: CPT

## 2019-12-17 RX ORDER — POTASSIUM CHLORIDE 20 MEQ/1
40 TABLET, EXTENDED RELEASE ORAL AS NEEDED
Status: DISCONTINUED | OUTPATIENT
Start: 2019-12-17 | End: 2019-12-18 | Stop reason: HOSPADM

## 2019-12-17 RX ORDER — POTASSIUM CHLORIDE 1.5 G/1.77G
40 POWDER, FOR SOLUTION ORAL AS NEEDED
Status: DISCONTINUED | OUTPATIENT
Start: 2019-12-17 | End: 2019-12-18 | Stop reason: HOSPADM

## 2019-12-17 RX ORDER — MAGNESIUM SULFATE HEPTAHYDRATE 40 MG/ML
4 INJECTION, SOLUTION INTRAVENOUS AS NEEDED
Status: DISCONTINUED | OUTPATIENT
Start: 2019-12-17 | End: 2019-12-18 | Stop reason: HOSPADM

## 2019-12-17 RX ORDER — MAGNESIUM SULFATE 1 G/100ML
1 INJECTION INTRAVENOUS AS NEEDED
Status: DISCONTINUED | OUTPATIENT
Start: 2019-12-17 | End: 2019-12-18 | Stop reason: HOSPADM

## 2019-12-17 RX ORDER — FUROSEMIDE 20 MG/1
20 TABLET ORAL NIGHTLY
Status: DISCONTINUED | OUTPATIENT
Start: 2019-12-17 | End: 2019-12-18 | Stop reason: HOSPADM

## 2019-12-17 RX ORDER — POTASSIUM CHLORIDE 29.8 MG/ML
20 INJECTION INTRAVENOUS
Status: DISCONTINUED | OUTPATIENT
Start: 2019-12-17 | End: 2019-12-18 | Stop reason: HOSPADM

## 2019-12-17 RX ORDER — MAGNESIUM SULFATE HEPTAHYDRATE 40 MG/ML
2 INJECTION, SOLUTION INTRAVENOUS AS NEEDED
Status: DISCONTINUED | OUTPATIENT
Start: 2019-12-17 | End: 2019-12-18 | Stop reason: HOSPADM

## 2019-12-17 RX ADMIN — MICONAZOLE NITRATE: 20 CREAM TOPICAL at 21:48

## 2019-12-17 RX ADMIN — FUROSEMIDE 60 MG: 40 TABLET ORAL at 17:19

## 2019-12-17 RX ADMIN — POTASSIUM CHLORIDE 40 MEQ: 1500 TABLET, EXTENDED RELEASE ORAL at 16:00

## 2019-12-17 RX ADMIN — FUROSEMIDE 20 MG: 20 TABLET ORAL at 21:28

## 2019-12-17 RX ADMIN — PRIMIDONE 250 MG: 250 TABLET ORAL at 09:17

## 2019-12-17 RX ADMIN — NYSTATIN: 100000 POWDER TOPICAL at 21:30

## 2019-12-17 RX ADMIN — METOPROLOL SUCCINATE 100 MG: 50 TABLET, EXTENDED RELEASE ORAL at 09:17

## 2019-12-17 RX ADMIN — PANTOPRAZOLE SODIUM 40 MG: 40 TABLET, DELAYED RELEASE ORAL at 06:01

## 2019-12-17 RX ADMIN — ROSUVASTATIN CALCIUM 40 MG: 5 TABLET, FILM COATED ORAL at 21:26

## 2019-12-17 RX ADMIN — CARBAMAZEPINE 200 MG: 200 TABLET ORAL at 21:27

## 2019-12-17 RX ADMIN — TICAGRELOR 90 MG: 90 TABLET ORAL at 21:30

## 2019-12-17 RX ADMIN — CARBAMAZEPINE 200 MG: 200 TABLET ORAL at 00:18

## 2019-12-17 RX ADMIN — NYSTATIN: 100000 POWDER TOPICAL at 09:17

## 2019-12-17 RX ADMIN — POTASSIUM CHLORIDE 40 MEQ: 1500 TABLET, EXTENDED RELEASE ORAL at 12:00

## 2019-12-17 RX ADMIN — POTASSIUM CHLORIDE 40 MEQ: 1500 TABLET, EXTENDED RELEASE ORAL at 16:07

## 2019-12-17 RX ADMIN — POTASSIUM CHLORIDE 40 MEQ: 1500 TABLET, EXTENDED RELEASE ORAL at 19:58

## 2019-12-17 RX ADMIN — MICONAZOLE NITRATE: 20 CREAM TOPICAL at 12:00

## 2019-12-17 RX ADMIN — ENOXAPARIN SODIUM 40 MG: 40 INJECTION SUBCUTANEOUS at 06:01

## 2019-12-17 RX ADMIN — MAGNESIUM SULFATE HEPTAHYDRATE 2 G: 40 INJECTION, SOLUTION INTRAVENOUS at 12:00

## 2019-12-17 RX ADMIN — CARBAMAZEPINE 200 MG: 200 TABLET ORAL at 09:17

## 2019-12-17 NOTE — NURSING NOTE
S/w pt, pt is supposed to be on 3 liters at home, but has not been compliant with this, MD notified.

## 2019-12-17 NOTE — PLAN OF CARE
Discharge Planning Assessment  WHITNEY Clayton     Patient Name: Destiny Catherine  MRN: [id number]  Today's Date: 12/17/2019    Admit Date: 12/16/2019    Discharge Needs Assessment       Row Name 12/17/19 1426       Living Environment    Lives With  spouse    Name(s) of Who Lives With Patient  Forrest spouse    Current Living Arrangements  home/apartment/condo    Primary Care Provided by  self;spouse/significant other    Provides Primary Care For  no one    Family Caregiver if Needed  spouse    Family Caregiver Names  Forrest    Quality of Family Relationships  supportive    Able to Return to Prior Arrangements  yes       Resource/Environmental Concerns    Resource/Environmental Concerns  none    Transportation Concerns  car, none       Transition Planning    Patient/Family Anticipates Transition to  home    Patient/Family Anticipated Services at Transition  none    Transportation Anticipated  family or friend will provide       Discharge Needs Assessment    Readmission Within the Last 30 Days  no previous admission in last 30 days    Concerns to be Addressed  no discharge needs identified    Equipment Currently Used at Home  wheelchair;rollator;walker, rolling;oxygen    Anticipated Changes Related to Illness  none    Equipment Needed After Discharge  commode            Discharge Plan       Row Name 12/17/19 3003       Plan    Plan  d/c home     Patient/Family in Agreement with Plan  yes    Plan Comments  Into room to see pt.  Facesheet verified.  Pt lives in a home with her pt.  Home has no stairs.  Pt uses front rolling walker and rollator at home.  She has oxgen at home but does not use it all the time, she cannot remember the company name that provides oxygen.  Her  is primary caretaker with housekeeping, cooking errands etc.  She deneis any problems getting medications from pharmacy or paying for medications.  Pt deneis having HH or rehab needs.  She refuses information on advanced directives.  She is  interested in obtaining a BSC due to frequent urination and soa.  APRN notified for order.  No other needs identified.  CM will continue to follow for needs.              Destination        Coordination has not been started for this encounter.          Durable Medical Equipment        Coordination has not been started for this encounter.          Dialysis/Infusion        Coordination has not been started for this encounter.          Home Medical Care        Coordination has not been started for this encounter.          Therapy        Coordination has not been started for this encounter.          Community Resources        Coordination has not been started for this encounter.              Demographic Summary       Row Name 12/17/19 1423       General Information    Admission Type  observation    Arrived From  home    Referral Source  admission list    Reason for Consult  discharge planning    Preferred Language  English     Used During This Interaction  no            Functional Status       Row Name 12/17/19 1424       Functional Status    Usual Activity Tolerance  moderate    Current Activity Tolerance  moderate       Functional Status, IADL    Medications  independent    Meal Preparation  assistive equipment and person    Housekeeping  assistive equipment and person    Laundry  assistive equipment and person    Shopping  assistive equipment and person       Mental Status    General Appearance WDL  WDL       Mental Status Summary    Recent Changes in Mental Status/Cognitive Functioning  no changes            Psychosocial    No documentation.         Abuse/Neglect    No documentation.         Legal    No documentation.         Substance Abuse    No documentation.         Patient Forms    No documentation.             Ponce Peralta RN

## 2019-12-17 NOTE — NURSING NOTE
CWON consult received. Patient with fungal dermatitis to left breast fold and upper left quadrant. Skin is moist, erythematous and with a foul yeast odor. There is also mild erythema noted to bilateral groin and abdominal folds, consistent with candidiasis. Nystatin powder has been appropriately ordered for folds. Will also recommend Miconazole cream to fungal rash on upper quadrant/breast areas; not to be used in deep, moist folds. It would also be appropriate to place a folded pillow case under left breast to reduce skin on skin friction and help wick moisture. Discussed with FERDINAND Horowitz and GEORGIA Botello. Thank you for consult and please call with any questions.

## 2019-12-17 NOTE — H&P
[facility] MEDICAL [facility] HOSPITALIST     Becca Unger MD    CHIEF COMPLAINT: Lower Extremity Edema    HISTORY OF PRESENT ILLNESS:    Ms. Catherine is a 65 y/o  female who presents w/ a 3-day history of worsening lower extremity edema (L>R) despite taking her water pill as prescribed.  She denies chest pain and dyspnea.  She denies fever and chills as well as cough.  Her appetite and PO intake have been normal and w/o nausea or vomiting.  She denies dysuria.  She is supposed to wear O2 full time, but she doesn't and reports she doesn't know why she was prescribed O2.  She denies bloody stools.  Edema seems to worsen w/ activity and gets better when she props her legs up.      Past Medical History:   Diagnosis Date   • Acute and chronic respiratory failure with hypoxia (CMS/HCC)    • Acute on chronic diastolic heart failure (CMS/HCC)    • Anemia    • Atrial fibrillation (CMS/HCC)    • CAD (coronary artery disease)    • Chronic diastolic heart failure (CMS/HCC) 4/27/2017   • Colon polyp    • COPD (chronic obstructive pulmonary disease) (CMS/HCC)    • Epilepsy (CMS/HCC)    • History of renal stent 4/27/2017   • Hyperlipidemia    • Hypertension    • Leukocytosis    • Malignant neoplasm of anus (CMS/HCC) 4/11/2018   • Myocardial infarction (CMS/HCC) 2000   • Nonruptured cerebral aneurysm    • Pulmonary hypertension (CMS/HCC)    • Stroke (CMS/HCC)     2009 and January 2010- [facility]   • Tobacco abuse    • UTI (urinary tract infection)      Past Surgical History:   Procedure Laterality Date   • APPENDECTOMY     • CEREBRAL ANGIOGRAM      stent placement at [facility]   • CHOLECYSTECTOMY     • COLONOSCOPY N/A 5/8/2019    Procedure: COLONOSCOPY; POLYPECTOMY;  Surgeon: Deborah Cedeno MD;  Location: [facility];  Service: Gastroenterology   • CORONARY STENT PLACEMENT     • TOTAL HIP ARTHROPLASTY       Family History   Problem Relation Age of Onset   • Hypertension Mother    • Lung cancer Mother    • Heart disease Father    •  Stroke Father    • Colon cancer Neg Hx    • Colon polyps Neg Hx      Social History     Tobacco Use   • Smoking status: Former Smoker     Types: Electronic Cigarette   • Smokeless tobacco: Never Used   • Tobacco comment: caffine use   Substance Use Topics   • Alcohol use: No   • Drug use: No     Medications Prior to Admission   Medication Sig Dispense Refill Last Dose   • carBAMazepine (TEGretol) 200 MG tablet Take 200 mg by mouth 2 (Two) Times a Day.   12/16/2019 at Unknown time   • esomeprazole (nexIUM) 40 MG capsule Take 40 mg by mouth Every Morning Before Breakfast.   12/16/2019 at Unknown time   • furosemide (LASIX) 40 MG tablet Take 1 tablet by mouth Daily. (Patient taking differently: Take 40 mg by mouth Daily. 60mg daily) 30 tablet 0 12/16/2019 at Unknown time   • ipratropium-albuterol (COMBIVENT RESPIMAT)  MCG/ACT inhaler Inhale 1 puff 4 (Four) Times a Day. 4 g 0 12/16/2019 at Unknown time   • metoprolol succinate XL (TOPROL-XL) 100 MG 24 hr tablet Take 100 mg by mouth Daily.   12/16/2019 at Unknown time   • primidone (MYSOLINE) 250 MG tablet Take 250 mg by mouth Daily.   12/15/2019 at Unknown time   • rosuvastatin (CRESTOR) 40 MG tablet Take 40 mg by mouth Every Night.   12/15/2019 at Unknown time   • ticagrelor (BRILINTA) 90 MG tablet tablet Take 90 mg by mouth 2 (Two) Times a Day.   12/16/2019 at Unknown time   • Ergocalciferol (VITAMIN D2 PO) Take 50,000 tablets by mouth 1 (One) Time Per Week.   5/6/2019 at 0900   • nitroglycerin (NITROSTAT) 0.4 MG SL tablet PLEASE SEE ATTACHED FOR DETAILED DIRECTIONS  1 More than a month at Unknown time     Allergies:  Plavix [clopidogrel bisulfate] and Keflex [cephalexin]    REVIEW OF SYSTEMS:  Please see the above history of present illness for pertinent positives and negatives.  The remainder of the patient's systems have been reviewed and are negative.    Vital Signs  Temp:  [97.8 °F (36.6 °C)] 97.8 °F (36.6 °C)  Heart Rate:  [76-77] 76  Resp:  [28-30]  "28  BP: (120-131)/(77-97) 120/97  Oxygen Therapy  SpO2: 93 %  Device (Oxygen Therapy): room air}  Body mass index is 28.09 kg/m².  Flowsheet Rows      First Filed Value   Admission Height  170 cm (66.93\") Documented at 12/16/2019 1507   Admission Weight  81.2 kg (179 lb) Documented at 12/16/2019 1507           Physical Exam:  Physical Exam   Constitutional: Patient appears well-developed and well-nourished and in no acute distress.  Appears older than stated age.   HEENT:   Head: Normocephalic and atraumatic.   Eyes:  Pupils are equal, round, and reactive to light. EOM are intact. Sclerae are anicteric and noninjected.  Mouth and Throat: Patient has moist mucous membranes. Oropharynx is clear of any erythema or exudate.     Neck: Neck supple. No JVD present. No thyromegaly present. No lymphadenopathy present.  Cardiovascular: Irregular rate and rhythm, S1 normal and S2 normal.  No murmur heard.  Pulmonary/Chest: Lungs are clear to auscultation bilaterally. No respiratory distress. No wheezes. No rhonchi. No rales.   Abdominal: Soft. Bowel sounds are normal. There is no tenderness.   Musculoskeletal: Normal muscle tone  Extremities: Pitting pedal edema L>R w/ some slight pretibial edema noted.  Neurological: Cranial nerves II-XII are grossly intact with no focal deficits.  Skin: Skin is warm. No rash noted. Nails show no clubbing.  No cyanosis or erythema.    Emotional Behavior:    Judgement and Insight: Unknown   Mental Status:  Alertness  Normal   Memory:  Unknown   Mood and Affect:         Depression  None               Anxiety  None    Debilities:   Physical Weakness  None   Handicaps  None   Disabilities  None   Agitation  None     Results Review:    I reviewed the patient's new clinical results.  Lab Results (most recent)     Procedure Component Value Units Date/Time    Procalcitonin [[id number]]  (Abnormal) Collected:  12/16/19 1539    Specimen:  Blood Updated:  12/16/19 1814     Procalcitonin 0.09 ng/mL     " BNP [[id number]]  (Abnormal) Collected:  12/16/19 1539    Specimen:  Blood Updated:  12/16/19 1629     proBNP 10,192.0 pg/mL     Narrative:       Among patients with dyspnea, NT-proBNP is highly sensitive for the detection of acute congestive heart failure. In addition NT-proBNP of <300 pg/ml effectively rules out acute congestive heart failure with 99% negative predictive value.      Troponin [[id number]]  (Normal) Collected:  12/16/19 1539    Specimen:  Blood Updated:  12/16/19 1622     Troponin T <0.010 ng/mL     Narrative:       Troponin T Reference Range:  <= 0.03 ng/mL-   Negative for AMI  >0.03 ng/mL-     Abnormal for myocardial necrosis.  Clinicians would have to utilize clinical acumen, EKG, Troponin and serial changes to determine if it is an Acute Myocardial Infarction or myocardial injury due to an underlying chronic condition.     Comprehensive Metabolic Panel [[id number]]  (Abnormal) Collected:  12/16/19 1539    Specimen:  Blood Updated:  12/16/19 1620     Glucose 77 mg/dL      BUN 19 mg/dL      Creatinine 1.38 mg/dL      Sodium 140 mmol/L      Potassium 3.2 mmol/L      Chloride 101 mmol/L      CO2 27.1 mmol/L      Calcium 8.5 mg/dL      Total Protein 7.7 g/dL      Albumin 3.70 g/dL      ALT (SGPT) 7 U/L      AST (SGOT) 13 U/L      Alkaline Phosphatase 196 U/L      Total Bilirubin 0.3 mg/dL      eGFR Non African Amer 38 mL/min/1.73      Globulin 4.0 gm/dL      A/G Ratio 0.9 g/dL      BUN/Creatinine Ratio 13.8     Anion Gap 11.9 mmol/L     Narrative:       GFR Normal >60  Chronic Kidney Disease <60  Kidney Failure <15      D-dimer, Quantitative [[id number]]  (Abnormal) Collected:  12/16/19 1539    Specimen:  Blood Updated:  12/16/19 1615     D-Dimer, Quantitative 0.66 MCGFEU/mL     Narrative:       Can be elevated in, but is not diagnostic for deep vein thrombosis (DVT) or pulmonary embolis (PE).  It is also elevated in other medical conditions.  Clinical correlation is required.  The negative cut-off value  for the D-Dimer is 0.50 mcg FEU/mL for DVT and PE.      CBC & Differential [[id number]] Collected:  12/16/19 1539    Specimen:  Blood Updated:  12/16/19 1601    Narrative:       The following orders were created for panel order CBC & Differential.  Procedure                               Abnormality         Status                     ---------                               -----------         ------                     CBC Auto Differential[[id number]]        Abnormal            Final result                 Please view results for these tests on the individual orders.    CBC Auto Differential [[id number]]  (Abnormal) Collected:  12/16/19 1539    Specimen:  Blood Updated:  12/16/19 1601     WBC 8.71 10*3/mm3      RBC 4.40 10*6/mm3      Hemoglobin 10.8 g/dL      Hematocrit 36.3 %      MCV 82.5 fL      MCH 24.5 pg      MCHC 29.8 g/dL      RDW 18.4 %      RDW-SD 54.8 fl      MPV 11.0 fL      Platelets 224 10*3/mm3      Neutrophil % 77.5 %      Lymphocyte % 10.1 %      Monocyte % 10.4 %      Eosinophil % 0.9 %      Basophil % 0.6 %      Immature Grans % 0.5 %      Neutrophils, Absolute 6.75 10*3/mm3      Lymphocytes, Absolute 0.88 10*3/mm3      Monocytes, Absolute 0.91 10*3/mm3      Eosinophils, Absolute 0.08 10*3/mm3      Basophils, Absolute 0.05 10*3/mm3      Immature Grans, Absolute 0.04 10*3/mm3      nRBC 0.0 /100 WBC     Carbamazepine Level, Total [[id number]] Collected:  12/16/19 1539    Specimen:  Blood Updated:  12/16/19 1559          Imaging Results (Most Recent)     Procedure Component Value Units Date/Time    NM Lung Ventilation Perfusion [[id number]] Resulted:  12/16/19 1904     Updated:  12/16/19 1918    XR Chest 1 View [[id number]] Collected:  12/16/19 1709     Updated:  12/16/19 1738    Narrative:       CR Chest 1 Vw    INDICATION:   Shortness of air with swelling in legs today     COMPARISON:    Chest film 3/2/2015    FINDINGS:  Single portable AP view(s) of the chest.  There is stable cardiomegaly with  tortuous aorta. There is mild pulmonary venous distention and mild interstitial prominence in both lungs diffusely fairly similar to or slightly improved from 3/2/2015.      Impression:       Cardiomegaly with pulmonary venous distention and bilateral diffuse interstitial changes stable to improved over 3/2/2015. This could represent chronic change, however, recurrent of pulmonary edema could have this appearance. Note that the lungs did not  demonstrate a diffuse interstitial pattern on prior chest film 6/12/2010    Signer Name: Trish Lubin MD   Signed: 12/16/2019 5:09 PM   Workstation Name: [email or web address]    [facility]        reviewed    ECG/EMG Results (most recent)     Procedure Component Value Units Date/Time    ECG 12 Lead [[id number]] Collected:  12/16/19 1536     Updated:  12/16/19 1907    Narrative:       HEART RATE= 72  bpm  RR Interval= 838  ms  VT Interval=   ms  P Horizontal Axis=   deg  P Front Axis=   deg  QRSD Interval= 81  ms  QT Interval= 380  ms  QRS Axis= 9  deg  T Wave Axis= 42  deg  - ABNORMAL ECG -  Atrial fibrillation  NO SIGNIFICANT CHANGE FROM PREVIOUS ECG  Electronically Signed By: Floyd Musa ([facility]) 16-Dec-2019 19:07:03  Date and Time of Study: 2019-12-16 15:36:10        reviewed    Assessment/Plan     1.  Hypoxic Respiratory Failure: Unclear if this is acute or acute on chronic given history.  I suspect given her clinical response, she is to be on O2 full time.  CXR read as unchanged to improved, but could still have edema.  Last echo was 3/2018 w/ preserved EF, Pulmonary HTN, and RV dysfunction.  BNP above her baseline.  Will exclude AMI and give dose of Lasix this evening and monitor clinical response.  Repeat echo in AM.  Will ask Cardiology to see since she has an appointment w/ Dr. Musa on Wednesday.    2.  Chronic Afib: Review of [facility] notes read that she is supposed to be on ASA for AC.  She is also rate controlled w/ Toprol and  Diltiazem.  Her rate is at goal.  I've continued her Toprol ans Diltiazem was not given as a home med.    3.  BAYLEE on CKD III: Baseline appears to be around 0.6.  Will monitor as she diureses.      4.  CAD: No reported chest pain.  As in #1.  No dynamic EKG changes.    5.  COPD: As discussed in #1.    6.  HTN: At goal on exam.  Continue to monitor.    7.  Hx/O Stroke and Cerebral Aneurysm: S/P coiling.  No acute issues.    I discussed the patients findings and my recommendations with patient.     Anival Crawford MD  12/16/19  7:30 PM

## 2019-12-17 NOTE — CONSULTS
Date of Hospital Visit: 19  Encounter Provider: Porfirio Lockhart MD  Place of Service: [facility] CARDIOLOGY  Patient Name: Destiny Catherine  :1955  Referral Provider: Dr Crawford    Chief complaint: SOA    History of Present Illness    Ms. Catherine is a 63yo woman who follows with Dr Musa.  She has stable CAD and underwent PTCA vs PCI in the distant past.  She has been maintained on ticagrelor 90mg BID only.  She has a history of stroke and was on aspirin/dipyridamole until some point and then was placed on ticagrelor. She is allergic to clopidogrel.    She has permanent atrial fibrillation.  She is not anticoagulated due to bleeding risk.  She seems to have a cognitive disability.    She has been prescribed oxygen to use in the past but she says she doesn't know why and that she doesn't.  A discharge summary from 2018 states that the patient refused to use it at home. She had an echo at that time that showed normal LVSF, RV dilation,and severe PHTN    She presents with increasing leg swelling and chronic dyspnea.  She was hypoxic upon arrival.  She takes oral furosemide (40mg daily) at home.   Her proBNP was 10K upon arrival. She received 80mg of IV furosemide last night with 1.7L UOP reported.  Also, it states her weight is down 10 pounds.  She says she feels much better.  She wants to go home.    Past Medical History:   Diagnosis Date   • Anemia    • CAD (coronary artery disease)    • Chronic diastolic heart failure (CMS/[facility]) 2017   • Colon polyp    • COPD (chronic obstructive pulmonary disease) (CMS/[facility])    • Coronary artery disease involving native coronary artery of native heart without angina pectoris 2017   • Epilepsy (CMS/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   • Nonruptured cerebral  aneurysm    • Permanent atrial fibrillation    • Pulmonary hypertension (CMS/HCC)    • Stroke (CMS/HCC)     2009 and January 2010- [facility]   • Tobacco abuse    • UTI (urinary tract infection)        Past Surgical History:   Procedure Laterality Date   • APPENDECTOMY     • CEREBRAL ANGIOGRAM      stent placement at [facility]   • CHOLECYSTECTOMY     • COLONOSCOPY N/A 5/8/2019    Procedure: COLONOSCOPY; POLYPECTOMY;  Surgeon: Deborah Cedeno MD;  Location: [facility];  Service: Gastroenterology   • CORONARY STENT PLACEMENT     • TOTAL HIP ARTHROPLASTY         Prior to Admission medications    Medication Sig Start Date End Date Taking? Authorizing Provider   carBAMazepine (TEGretol) 200 MG tablet Take 200 mg by mouth 2 (Two) Times a Day.   Yes Kaiser Harding MD   esomeprazole (nexIUM) 40 MG capsule Take 40 mg by mouth Every Morning Before Breakfast.   Yes Kaiser Harding MD   furosemide (LASIX) 40 MG tablet Take 1 tablet by mouth Daily.  Patient taking differently: Take 40 mg by mouth Daily. 60mg daily 3/30/18  Yes Monica Mayorga MD   ipratropium-albuterol (COMBIVENT RESPIMAT)  MCG/ACT inhaler Inhale 1 puff 4 (Four) Times a Day. 3/30/18  Yes Monica Mayorga MD   metoprolol succinate XL (TOPROL-XL) 100 MG 24 hr tablet Take 100 mg by mouth Daily.   Yes Kaiser Harding MD   primidone (MYSOLINE) 250 MG tablet Take 250 mg by mouth Daily.   Yes Kaiser Harding MD   rosuvastatin (CRESTOR) 40 MG tablet Take 40 mg by mouth Every Night.   Yes Kaiser Harding MD   ticagrelor (BRILINTA) 90 MG tablet tablet Take 90 mg by mouth 2 (Two) Times a Day.   Yes Kaiser Harding MD   nitroglycerin (NITROSTAT) 0.4 MG SL tablet PLEASE SEE ATTACHED FOR DETAILED DIRECTIONS 3/11/19   Kaiser Harding MD       Social History     Socioeconomic History   • Marital status:      Spouse name: Not on file   • Number of children: Not on file   • Years of education: Not on file   • Highest education level:  "Not on file   Tobacco Use   • Smoking status: Former Smoker     Types: Cigarettes   • Smokeless tobacco: Never Used   • Tobacco comment: caffine use   Substance and Sexual Activity   • Alcohol use: No   • Drug use: No   • Sexual activity: Defer       Family History   Problem Relation Age of Onset   • Hypertension Mother    • Lung cancer Mother    • Heart disease Father    • Stroke Father    • Colon cancer Neg Hx    • Colon polyps Neg Hx        Review of Systems   Constitutional: Positive for fatigue.   Respiratory: Positive for shortness of breath.    Cardiovascular: Positive for leg swelling.   All other systems reviewed and are negative.       Objective:     Vitals:    12/17/19 0052 12/17/19 0600 12/17/19 0635 12/17/19 0700   BP:   138/82 123/81   BP Location:   Left arm Left arm   Patient Position:   Lying Lying   Pulse:   78 69   Resp:   22 18   Temp:   97.5 °F (36.4 °C) 97.5 °F (36.4 °C)   TempSrc:   Oral Oral   SpO2: 93%  94% 94%   Weight:  76.9 kg (169 lb 9.6 oz)     Height:  170 cm (66.93\")       Body mass index is 26.62 kg/m².    Last Weight and Admission Weight        12/17/19  0600   Weight: 76.9 kg (169 lb 9.6 oz)     Flowsheet Rows      First Filed Value   Admission Height  170 cm (66.93\") Documented at 12/16/2019 1507   Admission Weight  81.2 kg (179 lb) Documented at 12/16/2019 1507            Intake/Output Summary (Last 24 hours) at 12/17/2019 1101  Last data filed at 12/17/2019 0600  Gross per 24 hour   Intake 880 ml   Output 1700 ml   Net -820 ml         Physical Exam   Constitutional: She appears well-developed and well-nourished.   HENT:   Head: Normocephalic.   Nose: Nose normal.   Mouth/Throat: Mucous membranes are dry.   edentulous   Eyes: Pupils are equal, round, and reactive to light. Conjunctivae and EOM are normal.   Neck: Normal range of motion. No JVD present.   Cardiovascular: Normal rate and normal heart sounds. An irregularly irregular rhythm present.   Pulmonary/Chest: Effort normal " and breath sounds normal.   Abdominal: Soft. There is no tenderness.   Musculoskeletal: Normal range of motion. She exhibits no edema.   Neurological: She is alert. No cranial nerve deficit.   Skin: Skin is warm and dry. No erythema.   Psychiatric: She has a normal mood and affect.   Vitals reviewed.      Lab Review:                Results from last 7 days   Lab Units 12/17/19  0329   SODIUM mmol/L 140   POTASSIUM mmol/L 2.8*   CHLORIDE mmol/L 99   CO2 mmol/L 27.9   BUN mg/dL 17   CREATININE mg/dL 1.21*   GLUCOSE mg/dL 94   CALCIUM mg/dL 8.4*     Results from last 7 days   Lab Units 12/17/19  0329 12/16/19  1539   TROPONIN T ng/mL <0.010 <0.010     Results from last 7 days   Lab Units 12/17/19  0329   WBC 10*3/mm3 7.84   HEMOGLOBIN g/dL 10.0*   HEMATOCRIT % 33.8*   PLATELETS 10*3/mm3 184             Results from last 7 days   Lab Units 12/17/19  0329   MAGNESIUM mg/dL 1.9             I personally viewed and interpreted the patient's EKG/Telemetry data      Assessment/Plan:     1.  Acute on chronic respiratory failure with hypoxia (CMS/HCC)  2.  Chronic diastolic heart failure (CMS/HCC)  3.  Chronic right sided heart failure  4.  Permanent atrial fibrillation (CMS/HCC)  5.  CAD   6.  History of stroke  7.  Hypertension    She has a known history of chronic diastolic heart failure as well as severe pulmonary hypertension.  She is known to be chronically hypoxic but has refused oxygen.  This is documented in a discharge summary from March 2018.  I think that her chronic hypoxia is really not doing her any favors and I think she does need the oxygen.  I think this would allow her to remain euvolemic on her usual dose of diuretic.  After just one dose of IV furosemide, she is lying flat and she has no leg swelling and no rales on exam.    A repeat echocardiogram is pending and I will review that, but I suspect that she can go home whenever home oxygen can be arranged.    It does appear that she is on aspirin and to  ticagrelor for dual antiplatelet therapy because of her history of recurrent strokes.  Obviously, prasugrel is contraindicated in the history of stroke and she has an allergy to clopidogrel.  I presume that the fact that she is on dual antiplatelet therapy is why she is not on an oral anticoagulant for her atrial fibrillation.  However, I will let this be further elucidated in her follow-up visits with Dr. Musa.  She is actually scheduled to see him tomorrow and he will see her in the hospital.  If she is being discharged today, we will reschedule it for next week.

## 2019-12-17 NOTE — PROGRESS NOTES
"SERVICE: [facility] HOSPITALIST    CONSULTANTS:     CHIEF COMPLAINT: f/u BAYLEE, a/c dCHF    SUBJECTIVE: The patient reports she is feeling well and wants to go home.  She notes her breathing is back to her baseline.  She is unable to tell me who her local pulmonary doctor is who orders her oxygen.  She is requesting portable and home oxygen.  She is a poor historian and is unable to reproduce any local information regarding providers. She otherwise denies f/c/chest pain/n/v/d/abdominal pain or other new concerns.    OBJECTIVE:    BP 92/64 (BP Location: Right arm, Patient Position: Sitting)   Pulse 69   Temp 97.5 °F (36.4 °C) (Oral)   Resp 18   Ht 167.6 cm (66\")   Wt 76.7 kg (169 lb)   SpO2 94%   BMI 27.28 kg/m²     MEDS/LABS REVIEWED AND ORDERED    carBAMazepine 200 mg Oral BID   enoxaparin 40 mg Subcutaneous Q24H   furosemide 20 mg Oral Nightly   furosemide 60 mg Oral Daily   ipratropium-albuterol 3 mL Nebulization 4x Daily - RT   metoprolol succinate  mg Oral Daily   miconazole  Topical Q12H   nystatin  Topical Q12H   pantoprazole 40 mg Oral QAM   primidone 250 mg Oral Daily   rosuvastatin 40 mg Oral Nightly   ticagrelor 90 mg Oral BID     Physical Exam   Constitutional: She is oriented to person, place, and time.   Appears much older than stated age   HENT:   Head: Normocephalic and atraumatic.   Eyes: Pupils are equal, round, and reactive to light. EOM are normal.   Cardiovascular: Normal rate.   Irregular rhythm   Pulmonary/Chest:   Diminished throughout   Abdominal: Soft. Bowel sounds are normal. She exhibits no distension. There is no tenderness. There is no guarding.   Genitourinary:   Genitourinary Comments: Shah catheter   Musculoskeletal:   Left ankle 1+ pitting edema   Neurological: She is alert and oriented to person, place, and time.   Slurred speech at baseline   Skin: Skin is warm.   Erythema/whitish discharge under left breast, mild erythema under right breast "   Psychiatric: She has a normal mood and affect. Her behavior is normal.   Vitals reviewed.    LAB/DIAGNOSTICS:    Lab Results (last 24 hours)     Procedure Component Value Units Date/Time    Magnesium [[id number]]  (Normal) Collected:  12/17/19 0329    Specimen:  Blood Updated:  12/17/19 0718     Magnesium 1.9 mg/dL     Troponin [[id number]]  (Normal) Collected:  12/17/19 0329    Specimen:  Blood Updated:  12/17/19 0641     Troponin T <0.010 ng/mL     Narrative:       Troponin T Reference Range:  <= 0.03 ng/mL-   Negative for AMI  >0.03 ng/mL-     Abnormal for myocardial necrosis.  Clinicians would have to utilize clinical acumen, EKG, Troponin and serial changes to determine if it is an Acute Myocardial Infarction or myocardial injury due to an underlying chronic condition.     Comprehensive Metabolic Panel [[id number]]  (Abnormal) Collected:  12/17/19 0329    Specimen:  Blood Updated:  12/17/19 0637     Glucose 94 mg/dL      BUN 17 mg/dL      Creatinine 1.21 mg/dL      Sodium 140 mmol/L      Potassium 2.8 mmol/L      Chloride 99 mmol/L      CO2 27.9 mmol/L      Calcium 8.4 mg/dL      Total Protein 6.8 g/dL      Albumin 3.20 g/dL      ALT (SGPT) 7 U/L      AST (SGOT) 13 U/L      Alkaline Phosphatase 160 U/L      Total Bilirubin 0.3 mg/dL      eGFR Non African Amer 45 mL/min/1.73      Globulin 3.6 gm/dL      A/G Ratio 0.9 g/dL      BUN/Creatinine Ratio 14.0     Anion Gap 13.1 mmol/L     Narrative:       GFR Normal >60  Chronic Kidney Disease <60  Kidney Failure <15      CBC & Differential [[id number]] Collected:  12/17/19 0329    Specimen:  Blood Updated:  12/17/19 0503    Narrative:       The following orders were created for panel order CBC & Differential.  Procedure                               Abnormality         Status                     ---------                               -----------         ------                     CBC Auto Differential[[id number]]        Abnormal            Final result                  Please view results for these tests on the individual orders.    CBC Auto Differential [[id number]]  (Abnormal) Collected:  12/17/19 0329    Specimen:  Blood Updated:  12/17/19 0503     WBC 7.84 10*3/mm3      RBC 4.13 10*6/mm3      Hemoglobin 10.0 g/dL      Hematocrit 33.8 %      MCV 81.8 fL      MCH 24.2 pg      MCHC 29.6 g/dL      RDW 18.3 %      RDW-SD 53.9 fl      MPV 11.1 fL      Platelets 184 10*3/mm3      Neutrophil % 71.2 %      Lymphocyte % 13.5 %      Monocyte % 12.2 %      Eosinophil % 1.9 %      Basophil % 0.8 %      Immature Grans % 0.4 %      Neutrophils, Absolute 5.58 10*3/mm3      Lymphocytes, Absolute 1.06 10*3/mm3      Monocytes, Absolute 0.96 10*3/mm3      Eosinophils, Absolute 0.15 10*3/mm3      Basophils, Absolute 0.06 10*3/mm3      Immature Grans, Absolute 0.03 10*3/mm3      nRBC 0.0 /100 WBC     Carbamazepine Level, Total [[id number]]  (Normal) Collected:  12/16/19 1539    Specimen:  Blood Updated:  12/16/19 1946     Carbamazepine Level 5.4 mcg/mL     Procalcitonin [[id number]]  (Abnormal) Collected:  12/16/19 1539    Specimen:  Blood Updated:  12/16/19 1814     Procalcitonin 0.09 ng/mL     BNP [[id number]]  (Abnormal) Collected:  12/16/19 1539    Specimen:  Blood Updated:  12/16/19 1629     proBNP 10,192.0 pg/mL     Narrative:       Among patients with dyspnea, NT-proBNP is highly sensitive for the detection of acute congestive heart failure. In addition NT-proBNP of <300 pg/ml effectively rules out acute congestive heart failure with 99% negative predictive value.      Troponin [[id number]]  (Normal) Collected:  12/16/19 1539    Specimen:  Blood Updated:  12/16/19 1622     Troponin T <0.010 ng/mL     Narrative:       Troponin T Reference Range:  <= 0.03 ng/mL-   Negative for AMI  >0.03 ng/mL-     Abnormal for myocardial necrosis.  Clinicians would have to utilize clinical acumen, EKG, Troponin and serial changes to determine if it is an Acute Myocardial Infarction or myocardial injury due to  an underlying chronic condition.     Comprehensive Metabolic Panel [[id number]]  (Abnormal) Collected:  12/16/19 1539    Specimen:  Blood Updated:  12/16/19 1620     Glucose 77 mg/dL      BUN 19 mg/dL      Creatinine 1.38 mg/dL      Sodium 140 mmol/L      Potassium 3.2 mmol/L      Chloride 101 mmol/L      CO2 27.1 mmol/L      Calcium 8.5 mg/dL      Total Protein 7.7 g/dL      Albumin 3.70 g/dL      ALT (SGPT) 7 U/L      AST (SGOT) 13 U/L      Alkaline Phosphatase 196 U/L      Total Bilirubin 0.3 mg/dL      eGFR Non African Amer 38 mL/min/1.73      Globulin 4.0 gm/dL      A/G Ratio 0.9 g/dL      BUN/Creatinine Ratio 13.8     Anion Gap 11.9 mmol/L     Narrative:       GFR Normal >60  Chronic Kidney Disease <60  Kidney Failure <15      D-dimer, Quantitative [[id number]]  (Abnormal) Collected:  12/16/19 1539    Specimen:  Blood Updated:  12/16/19 1615     D-Dimer, Quantitative 0.66 MCGFEU/mL     Narrative:       Can be elevated in, but is not diagnostic for deep vein thrombosis (DVT) or pulmonary embolis (PE).  It is also elevated in other medical conditions.  Clinical correlation is required.  The negative cut-off value for the D-Dimer is 0.50 mcg FEU/mL for DVT and PE.      CBC & Differential [[id number]] Collected:  12/16/19 1539    Specimen:  Blood Updated:  12/16/19 1601    Narrative:       The following orders were created for panel order CBC & Differential.  Procedure                               Abnormality         Status                     ---------                               -----------         ------                     CBC Auto Differential[[id number]]        Abnormal            Final result                 Please view results for these tests on the individual orders.    CBC Auto Differential [[id number]]  (Abnormal) Collected:  12/16/19 1539    Specimen:  Blood Updated:  12/16/19 1601     WBC 8.71 10*3/mm3      RBC 4.40 10*6/mm3      Hemoglobin 10.8 g/dL      Hematocrit 36.3 %      MCV 82.5 fL      MCH 24.5  pg      MCHC 29.8 g/dL      RDW 18.4 %      RDW-SD 54.8 fl      MPV 11.0 fL      Platelets 224 10*3/mm3      Neutrophil % 77.5 %      Lymphocyte % 10.1 %      Monocyte % 10.4 %      Eosinophil % 0.9 %      Basophil % 0.6 %      Immature Grans % 0.5 %      Neutrophils, Absolute 6.75 10*3/mm3      Lymphocytes, Absolute 0.88 10*3/mm3      Monocytes, Absolute 0.91 10*3/mm3      Eosinophils, Absolute 0.08 10*3/mm3      Basophils, Absolute 0.05 10*3/mm3      Immature Grans, Absolute 0.04 10*3/mm3      nRBC 0.0 /100 WBC         ECG 12 Lead   Final Result   HEART RATE= 72  bpm   RR Interval= 838  ms   IN Interval=   ms   P Horizontal Axis=   deg   P Front Axis=   deg   QRSD Interval= 81  ms   QT Interval= 380  ms   QRS Axis= 9  deg   T Wave Axis= 42  deg   - ABNORMAL ECG -   Atrial fibrillation   NO SIGNIFICANT CHANGE FROM PREVIOUS ECG   Electronically Signed By: Floyd Musa ([facility]) 16-Dec-2019 19:07:03   Date and Time of Study: 2019-12-16 15:36:10        Results for orders placed during the hospital encounter of 12/16/19   Adult Transthoracic Echo Complete W/ Cont if Necessary Per Protocol    Narrative · Left ventricular systolic function is hyperdynamic (EF > 70%).   Calculated EF = 80.0%. Estimated EF appears to be in the range of greater   than 70%. All left ventricular wall segments contract normally. The left   ventricular cavity is small. Left ventricular wall thickness is consistent   with mild-to-moderate concentric hypertrophy. Left ventricular diastolic   function was indeterminate.  · Right ventricle not well visualized. Right ventricular cavity is   dilated.  · Mild to moderate tricuspid valve regurgitation is present. Calculated   right ventricular systolic pressure from tricuspid regurgitation is 89.4   mmHg. Severe pulmonary hypertension is present.        Nm Lung Ventilation Perfusion    Result Date: 12/16/2019  Low probability ventilation/perfusion scan for pulmonary embolus. Signer Name: Kike Matthews MD   Signed: 12/16/2019 7:30 PM  Workstation Name: [id number]  [facility]    Xr Chest 1 View    Result Date: 12/16/2019  Cardiomegaly with pulmonary venous distention and bilateral diffuse interstitial changes stable to improved over 3/2/2015. This could represent chronic change, however, recurrent of pulmonary edema could have this appearance. Note that the lungs did not demonstrate a diffuse interstitial pattern on prior chest film 6/12/2010 Signer Name: Trish Lubin MD  Signed: 12/16/2019 5:09 PM  Workstation Name: [email or web address]  [facility]    ASSESSMENT/PLAN:  Acute vs chronic hypoxic respiratory failure 2/2 acute on chronic diastolic CHF: Oxygen 83% on room air in ER  COPD without exacerbation:  No current acute issues  VQ scan negative for PE  Resume home regimen, unable to view pulmonary notes  Continue diuretic, see below  Check overnight oximetry tonight, walking oximetry tomorrow    Acute on chronic diastolic CHF:  Chronic atrial fibrillation:   CAD:   Hypertension: Cardiology evaluated  Continues home Toprol and diltiazem with daily Lasix dosing  Echo showed EF > 70%/severe pH/mild to moderate TR/indeterminate diastolic dysfunction  Shortness of air and edema resolved with 1 dose of IV Lasix  Resume home dosing of Lasix, patient reports to me as 60 mg a.m. and 20 mg p.m.  Continues aspirin, ticagrelor 90 mg twice daily, Crestor 40 mg nightly, metoprolol succinate 100 mg daily  Continue monitoring daily weight  Follow-up Dr. Musa as scheduled    Acute kidney injury:  Acute urinary retention:  Electrolyte imbalance: Replacement protocols utilized  Creatinine 1.21 today, 1.38 on admission  Patient requested discharge, however cancelled due to renal function  Add bladder scans, renal ultrasound  Check UA C&S  Strict I&O, daily weight    History of cerebral aneurysm s/p coiling and stroke: No current acute issues, chronic slurred speech    Chronic left  "ankle edema: Unchanged    Chronic anemia: No active blood loss noted    Intertrigo: wound RN following, significantly worse under left breast  Miconazole cream    PLAN FOR DISPOSITION: Home when able    FERDINAND Multani  Hospitalist, [facility]  12/17/19  3:19 PM    \"Dictated utilizing Dragon dictation\"    "

## 2019-12-17 NOTE — PLAN OF CARE
Magnesium and Potassium replaced as ordered.  Overnight sleep study ordered for tonight and Skin under left breast excoriated and bilateral groin area.  Procedures explained patient confrontational at times.  Patient has taken oxygen off several times today and placed on floor.  Reapplied and discussed several times to leave oxygen on.

## 2019-12-17 NOTE — NURSING NOTE
Discharge Planning Assessment  WHITNEY Clayton     Patient Name: Destiny Catherine  MRN: [id number]  Today's Date: 12/17/2019    Admit Date: 12/16/2019    Discharge Needs Assessment     Row Name 12/17/19 1426       Living Environment    Lives With  spouse    Name(s) of Who Lives With Patient  Forrest spouse    Current Living Arrangements  home/apartment/condo    Primary Care Provided by  self;spouse/significant other    Provides Primary Care For  no one    Family Caregiver if Needed  spouse    Family Caregiver Names  Forrest    Quality of Family Relationships  supportive    Able to Return to Prior Arrangements  yes       Resource/Environmental Concerns    Resource/Environmental Concerns  none    Transportation Concerns  car, none       Transition Planning    Patient/Family Anticipates Transition to  home    Patient/Family Anticipated Services at Transition  none    Transportation Anticipated  family or friend will provide       Discharge Needs Assessment    Readmission Within the Last 30 Days  no previous admission in last 30 days    Concerns to be Addressed  no discharge needs identified    Equipment Currently Used at Home  wheelchair;rollator;walker, rolling;oxygen    Anticipated Changes Related to Illness  none    Equipment Needed After Discharge  commode        Discharge Plan     Row Name 12/17/19 9295       Plan    Plan  d/c home     Patient/Family in Agreement with Plan  yes    Plan Comments  Into room to see pt.  Facesheet verified.  Pt lives in a home with her pt.  Home has no stairs.  Pt uses front rolling walker and rollator at home.  She has oxgen at home but does not use it all the time, she cannot remember the company name that provides oxygen.  Her  is primary caretaker with housekeeping, cooking errands etc.  She deneis any problems getting medications from pharmacy or paying for medications.  Pt deneis having HH or rehab needs.  She refuses information on advanced directives.  She is interested in  obtaining a BSC due to frequent urination and soa.  APRN notified for order.  No other needs identified.  CM will continue to follow for needs.          Destination      Coordination has not been started for this encounter.      Durable Medical Equipment      Coordination has not been started for this encounter.      Dialysis/Infusion      Coordination has not been started for this encounter.      Home Medical Care      Coordination has not been started for this encounter.      Therapy      Coordination has not been started for this encounter.      Community Resources      Coordination has not been started for this encounter.          Demographic Summary     Row Name 12/17/19 142       General Information    Admission Type  observation    Arrived From  home    Referral Source  admission list    Reason for Consult  discharge planning    Preferred Language  English     Used During This Interaction  no        Functional Status     Row Name 12/17/19 1424       Functional Status    Usual Activity Tolerance  moderate    Current Activity Tolerance  moderate       Functional Status, IADL    Medications  independent    Meal Preparation  assistive equipment and person    Housekeeping  assistive equipment and person    Laundry  assistive equipment and person    Shopping  assistive equipment and person       Mental Status    General Appearance WDL  WDL       Mental Status Summary    Recent Changes in Mental Status/Cognitive Functioning  no changes        Psychosocial    No documentation.       Abuse/Neglect    No documentation.       Legal    No documentation.       Substance Abuse    No documentation.       Patient Forms    No documentation.           Ponce Peralta RN

## 2019-12-17 NOTE — DISCHARGE SUMMARY
"Destiny Catherine  1955  [id number]    Hospitalists Discharge Summary    Date of Admission: 12/16/2019  Date of Discharge:  12/18/2019    History of Present Illness: Taken from [facility] on admit:  \"Ms. Catherine is a 63 y/o  female who presents w/ a 3-day history of worsening lower extremity edema (L>R) despite taking her water pill as prescribed.  She denies chest pain and dyspnea.  She denies fever and chills as well as cough.  Her appetite and PO intake have been normal and w/o nausea or vomiting.  She denies dysuria.  She is supposed to wear O2 full time, but she doesn't and reports she doesn't know why she was prescribed O2.  She denies bloody stools.  Edema seems to worsen w/ activity and gets better when she props her legs up.\"  Hospital Course Summary/Primary Discharge Diagnoses:  Acute vs chronic hypoxic respiratory failure 2/2 acute on chronic diastolic CHF:   Severe pulmonary hypertension: Cardiology followed, see below  Oxygen 83% on room air in ER, noted to desaturate to 81% with sleep  COPD without exacerbation:  Lower extremity edema resolved overnight with single dose of IV Lasix then transitioned to home dose oral Lasix for discharge  Refused walking oximetry, notes baseline activity is very low  Overnight oximetry demonstrated need for 2 L with sleep, currently on 2 L to maintain sats in the high 80s and low 90s  VQ scan negative for PE  Home with 2 L at all times  Home on home dose of Lasix 60 mg a.m., 20 mg p.m., homero  See below for full treatment, follow-up PCP 1 week  F/U pulmonary 1 week    Acute on chronic diastolic CHF:  Chronic permanent atrial fibrillation:   CAD:   Hypertension: Cardiology evaluated  Continues home Toprol and diltiazem with daily Lasix dosing  Echo showed EF > 70%/severe pH/mild to moderate TR/indeterminate diastolic dysfunction  Shortness of air and edema resolved with 1 dose of IV Lasix  Resume home dosing of Lasix, patient reports to me as 60 mg a.m. and 20 mg " p.m.  Continues aspirin, ticagrelor 90 mg twice daily, Crestor 40 mg nightly, metoprolol succinate 100 mg daily  Not an AC candidate  Continue monitoring daily weight  Follow-up Dr. Musa as scheduled    Secondary Discharge Diagnoses:  Acute kidney injury on CKD stage III:  Hyponatremia:   Acute urinary retention:  Rule out UTI:  Electrolyte imbalance: Replacement protocols utilized  Creatinine 1.14 today, 1.38 on admission, baseline previously approximately 0.6 to 0.8  Shah catheter placed initially due to IV diuretic, now Shah discontinued  Urine culture pending result, await final C&S to treat, asymptomatic  Renal ultrasound showed 7 mm right renal cyst otherwise negative acute findings  Follow-up cardiology and PCP with recommended repeat BMP in 1 week    History of cerebral aneurysm s/p coiling and stroke: No current acute issues, chronic slurred speech    Chronic left ankle edema: Edema resolved with IV Lasix    Chronic anemia:  HGB 10.0, consistent with past labs, follow-up PCP for further testing    Intertrigo: POA  Left breast worse than right, wound RN followed with recommendation to continue miconazole cream  Recommend home health to monitor as well    PCP  Patient Care Team:  Becca Unger MD as PCP - General  Becca Unger MD as PCP - Family Medicine    Consults:   Consults     Date and Time Order Name Status Description    12/17/2019 0022 Inpatient Cardiology Consult Completed         Operations and Procedures Performed:     Nm Lung Ventilation Perfusion    Result Date: 12/16/2019  Narrative: NM Pulm Vent And Perf HISTORY: 64-year-old female with shortness of breath for one day and swollen left leg. Elevated d-dimer. DOSE: *  28.4 mCi Tc99m DTPA. *  5.8 mCi Tc99m MAA. COMPARISON: Correlation is made to chest radiograph 12/16/2019. FINDINGS: Ventilation:  Patchy distribution of radiotracer and centralized clumping of the radiotracer indicates an obstructive lung disease. Perfusion:  There is  uniform distribution of radiotracer throughout both lungs. No ventilation/perfusion mismatch to suggest a pulmonary embolus.     Impression: Low probability ventilation/perfusion scan for pulmonary embolus. Signer Name: Kike Matthews MD  Signed: 12/16/2019 7:30 PM  Workstation Name: [id number]  [facility]    Xr Chest 1 View    Result Date: 12/16/2019  Narrative: CR Chest 1 Vw INDICATION: Shortness of air with swelling in legs today COMPARISON:  Chest film 3/2/2015 FINDINGS: Single portable AP view(s) of the chest.  There is stable cardiomegaly with tortuous aorta. There is mild pulmonary venous distention and mild interstitial prominence in both lungs diffusely fairly similar to or slightly improved from 3/2/2015.     Impression: Cardiomegaly with pulmonary venous distention and bilateral diffuse interstitial changes stable to improved over 3/2/2015. This could represent chronic change, however, recurrent of pulmonary edema could have this appearance. Note that the lungs did not demonstrate a diffuse interstitial pattern on prior chest film 6/12/2010 Signer Name: Trish Lubin MD  Signed: 12/16/2019 5:09 PM  Workstation Name: [email or web address]  [facility]    Us Renal Bilateral    Result Date: 12/17/2019  Narrative: BILATERAL RENAL ULTRASOUND, 12/17/2019  HISTORY: Edema and shortness of air and elevated creatinine.  COMPARISON: None  TECHNIQUE: Grayscale ultrasound imaging of both kidneys and the urinary bladder was performed.  FINDINGS: There is a 7 mm cyst in the right kidney.. . Right Kidney is  10.6cms, Left kidney is 9.5cms.. The kidneys otherwise appear normal. The bladder is collapsed        Impression: 7 mm right renal cyst otherwise normal  This report was finalized on 12/17/2019 4:23 PM by Dr. King Mayers MD.      Allergies:  is allergic to plavix [clopidogrel bisulfate] and keflex [cephalexin].    Robbi  No medications noted per report of 12/16/2019,  reviewed by me    Discharge Medications:     Discharge Medications      New Medications      Instructions Start Date   ipratropium-albuterol 0.5-2.5 mg/3 ml nebulizer  Commonly known as:  DUO-NEB  Replaces:  ipratropium-albuterol  MCG/ACT inhaler   3 mL, Nebulization, 4 Times Daily - RT      miconazole 2 % cream  Commonly known as:  MICOTIN   Topical, Every 12 Hours Scheduled         Changes to Medications      Instructions Start Date   furosemide 20 MG tablet  Commonly known as:  LASIX  What changed:    · medication strength  · how much to take  · how to take this  · when to take this  · additional instructions   60 mg a.m. and 20 mg p.m.         Continue These Medications      Instructions Start Date   BRILINTA 90 MG tablet tablet  Generic drug:  ticagrelor   90 mg, Oral, 2 Times Daily      carBAMazepine 200 MG tablet  Commonly known as:  TEGretol   200 mg, Oral, 2 Times Daily      CRESTOR 40 MG tablet  Generic drug:  rosuvastatin   40 mg, Oral, Nightly      esomeprazole 40 MG capsule  Commonly known as:  nexIUM   40 mg, Oral, Every Morning Before Breakfast      metoprolol succinate  MG 24 hr tablet  Commonly known as:  TOPROL-XL   100 mg, Oral, Daily      nitroglycerin 0.4 MG SL tablet  Commonly known as:  NITROSTAT   PLEASE SEE ATTACHED FOR DETAILED DIRECTIONS      primidone 250 MG tablet  Commonly known as:  MYSOLINE   250 mg, Oral, Daily         Stop These Medications    ipratropium-albuterol  MCG/ACT inhaler  Commonly known as:  COMBIVENT RESPIMAT  Replaced by:  ipratropium-albuterol 0.5-2.5 mg/3 ml nebulizer            Last Lab Results:   Lab Results (most recent)     Procedure Component Value Units Date/Time    Magnesium [[id number]]  (Normal) Collected:  12/17/19 0329    Specimen:  Blood Updated:  12/17/19 0718     Magnesium 1.9 mg/dL     Troponin [[id number]]  (Normal) Collected:  12/17/19 0329    Specimen:  Blood Updated:  12/17/19 0641     Troponin T <0.010 ng/mL     Narrative:        Troponin T Reference Range:  <= 0.03 ng/mL-   Negative for AMI  >0.03 ng/mL-     Abnormal for myocardial necrosis.  Clinicians would have to utilize clinical acumen, EKG, Troponin and serial changes to determine if it is an Acute Myocardial Infarction or myocardial injury due to an underlying chronic condition.     Comprehensive Metabolic Panel [[id number]]  (Abnormal) Collected:  12/17/19 0329    Specimen:  Blood Updated:  12/17/19 0637     Glucose 94 mg/dL      BUN 17 mg/dL      Creatinine 1.21 mg/dL      Sodium 140 mmol/L      Potassium 2.8 mmol/L      Chloride 99 mmol/L      CO2 27.9 mmol/L      Calcium 8.4 mg/dL      Total Protein 6.8 g/dL      Albumin 3.20 g/dL      ALT (SGPT) 7 U/L      AST (SGOT) 13 U/L      Alkaline Phosphatase 160 U/L      Total Bilirubin 0.3 mg/dL      eGFR Non African Amer 45 mL/min/1.73      Globulin 3.6 gm/dL      A/G Ratio 0.9 g/dL      BUN/Creatinine Ratio 14.0     Anion Gap 13.1 mmol/L     Narrative:       GFR Normal >60  Chronic Kidney Disease <60  Kidney Failure <15      CBC & Differential [[id number]] Collected:  12/17/19 0329    Specimen:  Blood Updated:  12/17/19 0503    Narrative:       The following orders were created for panel order CBC & Differential.  Procedure                               Abnormality         Status                     ---------                               -----------         ------                     CBC Auto Differential[[id number]]        Abnormal            Final result                 Please view results for these tests on the individual orders.    CBC Auto Differential [[id number]]  (Abnormal) Collected:  12/17/19 0329    Specimen:  Blood Updated:  12/17/19 0503     WBC 7.84 10*3/mm3      RBC 4.13 10*6/mm3      Hemoglobin 10.0 g/dL      Hematocrit 33.8 %      MCV 81.8 fL      MCH 24.2 pg      MCHC 29.6 g/dL      RDW 18.3 %      RDW-SD 53.9 fl      MPV 11.1 fL      Platelets 184 10*3/mm3      Neutrophil % 71.2 %      Lymphocyte % 13.5 %      Monocyte  % 12.2 %      Eosinophil % 1.9 %      Basophil % 0.8 %      Immature Grans % 0.4 %      Neutrophils, Absolute 5.58 10*3/mm3      Lymphocytes, Absolute 1.06 10*3/mm3      Monocytes, Absolute 0.96 10*3/mm3      Eosinophils, Absolute 0.15 10*3/mm3      Basophils, Absolute 0.06 10*3/mm3      Immature Grans, Absolute 0.03 10*3/mm3      nRBC 0.0 /100 WBC     Carbamazepine Level, Total [[id number]]  (Normal) Collected:  12/16/19 1539    Specimen:  Blood Updated:  12/16/19 1946     Carbamazepine Level 5.4 mcg/mL     Procalcitonin [[id number]]  (Abnormal) Collected:  12/16/19 1539    Specimen:  Blood Updated:  12/16/19 1814     Procalcitonin 0.09 ng/mL     BNP [[id number]]  (Abnormal) Collected:  12/16/19 1539    Specimen:  Blood Updated:  12/16/19 1629     proBNP 10,192.0 pg/mL     Narrative:       Among patients with dyspnea, NT-proBNP is highly sensitive for the detection of acute congestive heart failure. In addition NT-proBNP of <300 pg/ml effectively rules out acute congestive heart failure with 99% negative predictive value.      Troponin [[id number]]  (Normal) Collected:  12/16/19 1539    Specimen:  Blood Updated:  12/16/19 1622     Troponin T <0.010 ng/mL     Narrative:       Troponin T Reference Range:  <= 0.03 ng/mL-   Negative for AMI  >0.03 ng/mL-     Abnormal for myocardial necrosis.  Clinicians would have to utilize clinical acumen, EKG, Troponin and serial changes to determine if it is an Acute Myocardial Infarction or myocardial injury due to an underlying chronic condition.     Comprehensive Metabolic Panel [[id number]]  (Abnormal) Collected:  12/16/19 1539    Specimen:  Blood Updated:  12/16/19 1620     Glucose 77 mg/dL      BUN 19 mg/dL      Creatinine 1.38 mg/dL      Sodium 140 mmol/L      Potassium 3.2 mmol/L      Chloride 101 mmol/L      CO2 27.1 mmol/L      Calcium 8.5 mg/dL      Total Protein 7.7 g/dL      Albumin 3.70 g/dL      ALT (SGPT) 7 U/L      AST (SGOT) 13 U/L      Alkaline Phosphatase 196 U/L       Total Bilirubin 0.3 mg/dL      eGFR Non African Amer 38 mL/min/1.73      Globulin 4.0 gm/dL      A/G Ratio 0.9 g/dL      BUN/Creatinine Ratio 13.8     Anion Gap 11.9 mmol/L     Narrative:       GFR Normal >60  Chronic Kidney Disease <60  Kidney Failure <15      D-dimer, Quantitative [[id number]]  (Abnormal) Collected:  12/16/19 1539    Specimen:  Blood Updated:  12/16/19 1615     D-Dimer, Quantitative 0.66 MCGFEU/mL     Narrative:       Can be elevated in, but is not diagnostic for deep vein thrombosis (DVT) or pulmonary embolis (PE).  It is also elevated in other medical conditions.  Clinical correlation is required.  The negative cut-off value for the D-Dimer is 0.50 mcg FEU/mL for DVT and PE.      CBC & Differential [[id number]] Collected:  12/16/19 1539    Specimen:  Blood Updated:  12/16/19 1601    Narrative:       The following orders were created for panel order CBC & Differential.  Procedure                               Abnormality         Status                     ---------                               -----------         ------                     CBC Auto Differential[[id number]]        Abnormal            Final result                 Please view results for these tests on the individual orders.    CBC Auto Differential [[id number]]  (Abnormal) Collected:  12/16/19 1539    Specimen:  Blood Updated:  12/16/19 1601     WBC 8.71 10*3/mm3      RBC 4.40 10*6/mm3      Hemoglobin 10.8 g/dL      Hematocrit 36.3 %      MCV 82.5 fL      MCH 24.5 pg      MCHC 29.8 g/dL      RDW 18.4 %      RDW-SD 54.8 fl      MPV 11.0 fL      Platelets 224 10*3/mm3      Neutrophil % 77.5 %      Lymphocyte % 10.1 %      Monocyte % 10.4 %      Eosinophil % 0.9 %      Basophil % 0.6 %      Immature Grans % 0.5 %      Neutrophils, Absolute 6.75 10*3/mm3      Lymphocytes, Absolute 0.88 10*3/mm3      Monocytes, Absolute 0.91 10*3/mm3      Eosinophils, Absolute 0.08 10*3/mm3      Basophils, Absolute 0.05 10*3/mm3      Immature Grans,  Absolute 0.04 10*3/mm3      nRBC 0.0 /100 WBC         Imaging Results (Most Recent)     Procedure Component Value Units Date/Time    US Renal Bilateral [[id number]] Collected:  12/17/19 1622     Updated:  12/17/19 1625    Narrative:       BILATERAL RENAL ULTRASOUND, 12/17/2019     HISTORY:  Edema and shortness of air and elevated creatinine.     COMPARISON:  None     TECHNIQUE:  Grayscale ultrasound imaging of both kidneys and the urinary bladder was  performed.     FINDINGS:  There is a 7 mm cyst in the right kidney.. . Right Kidney is  10.6cms,  Left kidney is 9.5cms.. The kidneys otherwise appear normal. The bladder  is collapsed             Impression:       7 mm right renal cyst otherwise normal     This report was finalized on 12/17/2019 4:23 PM by Dr. King Mayers MD.       NM Lung Ventilation Perfusion [[id number]] Collected:  12/16/19 1930     Updated:  12/16/19 2224    Narrative:       NM Pulm Vent And Perf    HISTORY:  64-year-old female with shortness of breath for one day and swollen left leg. Elevated d-dimer.    DOSE:  *  28.4 mCi Tc99m DTPA.  *  5.8 mCi Tc99m MAA.    COMPARISON:   Correlation is made to chest radiograph 12/16/2019.    FINDINGS:   Ventilation:  Patchy distribution of radiotracer and centralized clumping of the radiotracer indicates an obstructive lung disease.    Perfusion:  There is uniform distribution of radiotracer throughout both lungs. No ventilation/perfusion mismatch to suggest a pulmonary embolus.      Impression:       Low probability ventilation/perfusion scan for pulmonary embolus.    Signer Name: Kike Matthews MD   Signed: 12/16/2019 7:30 PM   Workstation Name: [email or web address]    [facility]    XR Chest 1 View [[id number]] Collected:  12/16/19 1709     Updated:  12/16/19 1738    Narrative:       CR Chest 1 Vw    INDICATION:   Shortness of air with swelling in legs today     COMPARISON:    Chest film 3/2/2015    FINDINGS:  Single portable AP view(s) of the  chest.  There is stable cardiomegaly with tortuous aorta. There is mild pulmonary venous distention and mild interstitial prominence in both lungs diffusely fairly similar to or slightly improved from 3/2/2015.      Impression:       Cardiomegaly with pulmonary venous distention and bilateral diffuse interstitial changes stable to improved over 3/2/2015. This could represent chronic change, however, recurrent of pulmonary edema could have this appearance. Note that the lungs did not  demonstrate a diffuse interstitial pattern on prior chest film 6/12/2010    Signer Name: Trish Lubin MD   Signed: 12/16/2019 5:09 PM   Workstation Name: [email or web address]    [facility]        PROCEDURES: none    Condition on Discharge: stable    Physical Exam at Discharge  Vital Signs  Temp:  [97.6 °F (36.4 °C)-97.9 °F (36.6 °C)] 97.9 °F (36.6 °C)  Heart Rate:  [67-77] 67  Resp:  [18-20] 18  BP: ()/(60-89) 107/80   Body mass index is 27.28 kg/m².    Physical Exam   Constitutional: She is oriented to person, place, and time. She appears well-nourished.   Disheveled appearance, appears much older than stated age   HENT:   Head: Normocephalic and atraumatic.   Halitosis   Eyes: Pupils are equal, round, and reactive to light. EOM are normal.   Cardiovascular: Normal rate.   Irregular rhythm   Pulmonary/Chest: Effort normal.   Severely diminished all fields   Abdominal: Soft. Bowel sounds are normal. She exhibits no distension. There is no tenderness. There is no guarding.   Musculoskeletal: She exhibits no edema.   Neurological: She is alert and oriented to person, place, and time.   Speech slurred, reportedly baseline   Skin: Skin is warm and dry. No erythema.   Psychiatric: She has a normal mood and affect. Her behavior is normal.   Vitals reviewed.    Discharge Disposition  Home    Visiting Nurse:    Yes     Home PT/OT:  Yes     Home Safety Evaluation:  Yes     DME  Oxygen, bedside commode  arranged    Discharge Diet:      Dietary Orders (From admission, onward)     Start     Ordered    12/16/19 2027  Diet Regular; Cardiac  Diet Effective Now     Question Answer Comment   Diet Texture / Consistency Regular    Common Modifiers Cardiac        12/16/19 2026                Activity at Discharge:  As tolerated    Pre-discharge education  Cardiac, medications, follow-up    Follow-up Appointments  Future Appointments   Date Time Provider Department Center   5/12/2020  2:00 PM uLcas Damon MD [facility] None     Additional Instructions for the Follow-ups that You Need to Schedule     Discharge Follow-up with PCP   As directed       Currently Documented PCP:    Becca Unger MD    PCP Phone Number:    [phone]     Follow Up Details:  1 week         Discharge Follow-up with Specified Provider: Dr. Musa; 1 Week   As directed      To:  Dr. Musa    Follow Up:  1 Week         Discharge Follow-up with Specified Provider: Pulmonary; 1 Week   As directed      To:  Pulmonary    Follow Up:  1 Week               Test Results Pending at Discharge: To be followed up by PCP   Order Current Status    Urine Culture - Urine, Urine, Random Void In process           Italia Spears, FERDINAND  12/18/19  1:46 PM    Time: Discharge Over 30 min (if over 30 minutes give explanation as to why it took greater than 30 minutes)  Secondary to:   Coordination of care/follow up  Medication reconciliation  D/W patient, case management, review of lab/diagnostics/overnight oximetry

## 2019-12-18 ENCOUNTER — APPOINTMENT (OUTPATIENT)
Dept: GENERAL RADIOLOGY | Facility: HOSPITAL | Age: 64
End: 2019-12-18

## 2019-12-18 VITALS
SYSTOLIC BLOOD PRESSURE: 125 MMHG | HEART RATE: 77 BPM | RESPIRATION RATE: 18 BRPM | TEMPERATURE: 97.7 F | HEIGHT: 66 IN | WEIGHT: 169 LBS | BODY MASS INDEX: 27.16 KG/M2 | DIASTOLIC BLOOD PRESSURE: 79 MMHG | OXYGEN SATURATION: 95 %

## 2019-12-18 LAB
ANION GAP SERPL CALCULATED.3IONS-SCNC: 9.8 MMOL/L (ref 5–15)
ARTERIAL PATENCY WRIST A: POSITIVE
ATMOSPHERIC PRESS: 745 MMHG
BASE EXCESS BLDA CALC-SCNC: 4.2 MMOL/L (ref 0–2)
BASOPHILS # BLD AUTO: 0.05 10*3/MM3 (ref 0–0.2)
BASOPHILS NFR BLD AUTO: 0.7 % (ref 0–1.5)
BDY SITE: ABNORMAL
BODY TEMPERATURE: 37 C
BUN BLD-MCNC: 17 MG/DL (ref 8–23)
BUN/CREAT SERPL: 14.9 (ref 7–25)
CALCIUM SPEC-SCNC: 8.9 MG/DL (ref 8.6–10.5)
CHLORIDE SERPL-SCNC: 93 MMOL/L (ref 98–107)
CO2 SERPL-SCNC: 27.2 MMOL/L (ref 22–29)
CREAT BLD-MCNC: 1.14 MG/DL (ref 0.57–1)
DEPRECATED RDW RBC AUTO: 53.2 FL (ref 37–54)
EOSINOPHIL # BLD AUTO: 0.18 10*3/MM3 (ref 0–0.4)
EOSINOPHIL NFR BLD AUTO: 2.6 % (ref 0.3–6.2)
ERYTHROCYTE [DISTWIDTH] IN BLOOD BY AUTOMATED COUNT: 18 % (ref 12.3–15.4)
GAS FLOW AIRWAY: 2 LPM
GFR SERPL CREATININE-BSD FRML MDRD: 48 ML/MIN/1.73
GLUCOSE BLD-MCNC: 96 MG/DL (ref 65–99)
HCO3 BLDA-SCNC: 28.5 MMOL/L (ref 20–26)
HCT VFR BLD AUTO: 32.9 % (ref 34–46.6)
HGB BLD-MCNC: 10 G/DL (ref 12–15.9)
HGB BLDA-MCNC: 10.7 G/DL (ref 13.5–17.5)
IMM GRANULOCYTES # BLD AUTO: 0.01 10*3/MM3 (ref 0–0.05)
IMM GRANULOCYTES NFR BLD AUTO: 0.1 % (ref 0–0.5)
LYMPHOCYTES # BLD AUTO: 1.03 10*3/MM3 (ref 0.7–3.1)
LYMPHOCYTES NFR BLD AUTO: 15.1 % (ref 19.6–45.3)
MAGNESIUM SERPL-MCNC: 2 MG/DL (ref 1.6–2.4)
MCH RBC QN AUTO: 24.8 PG (ref 26.6–33)
MCHC RBC AUTO-ENTMCNC: 30.4 G/DL (ref 31.5–35.7)
MCV RBC AUTO: 81.4 FL (ref 79–97)
MODALITY: ABNORMAL
MONOCYTES # BLD AUTO: 0.68 10*3/MM3 (ref 0.1–0.9)
MONOCYTES NFR BLD AUTO: 10 % (ref 5–12)
NEUTROPHILS # BLD AUTO: 4.88 10*3/MM3 (ref 1.7–7)
NEUTROPHILS NFR BLD AUTO: 71.5 % (ref 42.7–76)
NT-PROBNP SERPL-MCNC: 9294 PG/ML (ref 5–900)
PCO2 BLDA: 40.4 MM HG (ref 35–45)
PCO2 TEMP ADJ BLD: 40.4 MM HG (ref 35–45)
PH BLDA: 7.46 PH UNITS (ref 7.35–7.45)
PH, TEMP CORRECTED: 7.46 PH UNITS (ref 7.35–7.45)
PLATELET # BLD AUTO: 174 10*3/MM3 (ref 140–450)
PMV BLD AUTO: 10.3 FL (ref 6–12)
PO2 BLDA: 65.4 MM HG (ref 83–108)
PO2 TEMP ADJ BLD: 65.4 MM HG (ref 83–108)
POTASSIUM BLD-SCNC: 4.1 MMOL/L (ref 3.5–5.2)
POTASSIUM BLD-SCNC: 4.6 MMOL/L (ref 3.5–5.2)
RBC # BLD AUTO: 4.04 10*6/MM3 (ref 3.77–5.28)
SAO2 % BLDCOA: 93.6 % (ref 94–99)
SODIUM BLD-SCNC: 130 MMOL/L (ref 136–145)
VENTILATOR MODE: ABNORMAL
WBC NRBC COR # BLD: 6.83 10*3/MM3 (ref 3.4–10.8)

## 2019-12-18 PROCEDURE — 94799 UNLISTED PULMONARY SVC/PX: CPT

## 2019-12-18 PROCEDURE — 99217 PR OBSERVATION CARE DISCHARGE MANAGEMENT: CPT | Performed by: NURSE PRACTITIONER

## 2019-12-18 PROCEDURE — 85025 COMPLETE CBC W/AUTO DIFF WBC: CPT | Performed by: NURSE PRACTITIONER

## 2019-12-18 PROCEDURE — 84132 ASSAY OF SERUM POTASSIUM: CPT | Performed by: HOSPITALIST

## 2019-12-18 PROCEDURE — 83735 ASSAY OF MAGNESIUM: CPT | Performed by: NURSE PRACTITIONER

## 2019-12-18 PROCEDURE — 25010000002 ENOXAPARIN PER 10 MG: Performed by: HOSPITALIST

## 2019-12-18 PROCEDURE — 80048 BASIC METABOLIC PNL TOTAL CA: CPT | Performed by: NURSE PRACTITIONER

## 2019-12-18 PROCEDURE — G0378 HOSPITAL OBSERVATION PER HR: HCPCS

## 2019-12-18 PROCEDURE — 82803 BLOOD GASES ANY COMBINATION: CPT

## 2019-12-18 PROCEDURE — 83880 ASSAY OF NATRIURETIC PEPTIDE: CPT | Performed by: NURSE PRACTITIONER

## 2019-12-18 PROCEDURE — 36600 WITHDRAWAL OF ARTERIAL BLOOD: CPT

## 2019-12-18 PROCEDURE — 71046 X-RAY EXAM CHEST 2 VIEWS: CPT

## 2019-12-18 PROCEDURE — 96372 THER/PROPH/DIAG INJ SC/IM: CPT

## 2019-12-18 PROCEDURE — 99213 OFFICE O/P EST LOW 20 MIN: CPT | Performed by: INTERNAL MEDICINE

## 2019-12-18 RX ORDER — FUROSEMIDE 20 MG/1
TABLET ORAL
Qty: 120 TABLET | Refills: 1 | Status: SHIPPED | OUTPATIENT
Start: 2019-12-18

## 2019-12-18 RX ORDER — IPRATROPIUM BROMIDE AND ALBUTEROL SULFATE 2.5; .5 MG/3ML; MG/3ML
3 SOLUTION RESPIRATORY (INHALATION)
Qty: 360 ML | Refills: 1 | Status: SHIPPED | OUTPATIENT
Start: 2019-12-18

## 2019-12-18 RX ADMIN — NYSTATIN: 100000 POWDER TOPICAL at 08:54

## 2019-12-18 RX ADMIN — MICONAZOLE NITRATE: 20 CREAM TOPICAL at 08:55

## 2019-12-18 RX ADMIN — TICAGRELOR 90 MG: 90 TABLET ORAL at 08:54

## 2019-12-18 RX ADMIN — METOPROLOL SUCCINATE 100 MG: 50 TABLET, EXTENDED RELEASE ORAL at 08:54

## 2019-12-18 RX ADMIN — IPRATROPIUM BROMIDE AND ALBUTEROL SULFATE 3 ML: .5; 3 SOLUTION RESPIRATORY (INHALATION) at 16:37

## 2019-12-18 RX ADMIN — CARBAMAZEPINE 200 MG: 200 TABLET ORAL at 08:54

## 2019-12-18 RX ADMIN — PANTOPRAZOLE SODIUM 40 MG: 40 TABLET, DELAYED RELEASE ORAL at 06:04

## 2019-12-18 RX ADMIN — IPRATROPIUM BROMIDE AND ALBUTEROL SULFATE 3 ML: .5; 3 SOLUTION RESPIRATORY (INHALATION) at 09:57

## 2019-12-18 RX ADMIN — ENOXAPARIN SODIUM 40 MG: 40 INJECTION SUBCUTANEOUS at 06:04

## 2019-12-18 RX ADMIN — PRIMIDONE 250 MG: 250 TABLET ORAL at 08:54

## 2019-12-18 RX ADMIN — FUROSEMIDE 60 MG: 40 TABLET ORAL at 08:54

## 2019-12-18 NOTE — PLAN OF CARE
Problem: Patient Care Overview  Goal: Individualization and Mutuality  Outcome: Ongoing (interventions implemented as appropriate)     Problem: Chronic Obstructive Pulmonary Disease (Adult)  Goal: Signs and Symptoms of Listed Potential Problems Will be Absent, Minimized or Managed (Chronic Obstructive Pulmonary Disease)  Outcome: Ongoing (interventions implemented as appropriate)

## 2019-12-18 NOTE — PROGRESS NOTES
Contacted regarding  being concerned about patient's respiratory status  Checked ABG, canceled discharge for now pending results    Addendum, reviewed ABG, nothing acute  Patient has pulmonology,and cardiology follow up  Chronic findings, discharge OK, d/w with my attending Dr. Payne

## 2019-12-18 NOTE — NURSING NOTE
Continued Stay Note  WHITNEY Clayton     Patient Name: Destiny Catherine  MRN: [id number]  Today's Date: 12/18/2019    Admit Date: 12/16/2019    Discharge Plan     Row Name 12/18/19 1450       Plan    Plan  d/c home     Patient/Family in Agreement with Plan  yes    Plan Comments  into room to prepare for discharge.  BSC issued.  Home health has been recommended.  Pt agrees to this and has no preference as to what agency she uses. Pt refuses quality indicator sheets. Trish with [facility] given referral.   Her  is at bedside and we discussed pt's need for portable oxygen to use for drive home. Pt's  agrees to go home and retrieve portable oxygen tank.  New orders sent to [facility] to update their records.   No other needs noted at this time.  CM will continue to follow.      Row Name 12/18/19 1444       Plan    Plan  d/c home    Plan Comments  Into room        Discharge Codes    No documentation.       Expected Discharge Date and Time     Expected Discharge Date Expected Discharge Time    Dec 18, 2019             Ponce Peralta RN

## 2019-12-18 NOTE — DISCHARGE INSTR - APPOINTMENTS
Follow up with Becca Unger on 12/30/2019 at 1030 am        Follow up with pulmonary 2/10/2020 at 2 pm at [facility]   [phone]

## 2019-12-18 NOTE — PROGRESS NOTES
"    Patient Name: Destiny Catherine  :1955  64 y.o.      Patient Care Team:  Becca Unger MD as PCP - General  Becca Unger MD as PCP - Family Medicine    Chief Complaint: SOB     Interval History: diuresed, feels much better   Echo yesterday:  Interpretation Summary     · Left ventricular systolic function is hyperdynamic (EF > 70%). Calculated EF = 80.0%. Estimated EF appears to be in the range of greater than 70%. All left ventricular wall segments contract normally. The left ventricular cavity is small. Left ventricular wall thickness is consistent with mild-to-moderate concentric hypertrophy. Left ventricular diastolic function was indeterminate.  · Right ventricle not well visualized. Right ventricular cavity is dilated.  · Mild to moderate tricuspid valve regurgitation is present. Calculated right ventricular systolic pressure from tricuspid regurgitation is 89.4 mmHg. Severe pulmonary hypertension is present.         Objective   Vital Signs  Temp:  [97.6 °F (36.4 °C)-97.8 °F (36.6 °C)] 97.6 °F (36.4 °C)  Heart Rate:  [72-77] 72  Resp:  [18-19] 18  BP: ()/(47-89) 142/89    Intake/Output Summary (Last 24 hours) at 2019 0822  Last data filed at 2019 0540  Gross per 24 hour   Intake 1010 ml   Output 3025 ml   Net -2015 ml     Flowsheet Rows      First Filed Value   Admission Height  170 cm (66.93\") Documented at 2019 1507   Admission Weight  81.2 kg (179 lb) Documented at 2019 1507          Physical Exam:   General Appearance:    Alert, cooperative, in no acute distress   Lungs:     Clear to auscultation.  Normal respiratory effort and rate.      Heart:    Regular rhythm and normal rate, normal S1 and S2, no murmurs, gallops or rubs.     Chest Wall:    No abnormalities observed   Abdomen:     Soft, nontender, positive bowel sounds.     Extremities:   no cyanosis, clubbing or edema.  No marked joint deformities.  Adequate musculoskeletal strength.       Results Review:  "   Results from last 7 days   Lab Units 12/18/19  0659   SODIUM mmol/L 130*   POTASSIUM mmol/L 4.1   CHLORIDE mmol/L 93*   CO2 mmol/L 27.2   BUN mg/dL 17   CREATININE mg/dL 1.14*   GLUCOSE mg/dL 96   CALCIUM mg/dL 8.9     Results from last 7 days   Lab Units 12/17/19  0329 12/16/19  1539   TROPONIN T ng/mL <0.010 <0.010     Results from last 7 days   Lab Units 12/18/19  0659   WBC 10*3/mm3 6.83   HEMOGLOBIN g/dL 10.0*   HEMATOCRIT % 32.9*   PLATELETS 10*3/mm3 174         Results from last 7 days   Lab Units 12/18/19  0013   MAGNESIUM mg/dL 2.0                   Medication Review:     carBAMazepine 200 mg Oral BID   enoxaparin 40 mg Subcutaneous Q24H   furosemide 20 mg Oral Nightly   furosemide 60 mg Oral Daily   ipratropium-albuterol 3 mL Nebulization 4x Daily - RT   metoprolol succinate  mg Oral Daily   miconazole  Topical Q12H   nystatin  Topical Q12H   pantoprazole 40 mg Oral QAM   primidone 250 mg Oral Daily   rosuvastatin 40 mg Oral Nightly   ticagrelor 90 mg Oral BID             Assessment/Plan   Active Hospital Problems    Diagnosis  POA   • Acute on chronic respiratory failure with hypoxia (CMS/HCC) [J96.21]  Unknown   • Chronic right-sided heart failure (CMS/HCC) [I50.812]  Unknown   • Atrial fibrillation (CMS/HCC) [I48.91]  Yes   • Chronic diastolic heart failure (CMS/HCC) [I50.32]  Yes   • Coronary artery disease involving native coronary artery of native heart without angina pectoris [I25.10]  Yes   • Hypertension [I10]  Yes      Resolved Hospital Problems   No resolved problems to display.     1.  Acute on chronic respiratory failure with hypoxia-improved  2.  Severe pulmonary hypertension-patient has been on oxygen in the past by her pulmonologist, but she is not on this any longer.  Overnight sleep study to assess for indication for home oxygen therapy.  3.  Acute on chronic diastolic and Right heart failure-second to the pulmonary hypertension.  Left ventricular systolic function is normal.   Improved with diuresis  4.   Permanent atrial fibrillation.  CHADS Vasc score is 6.  Continue beta-blockade for rate control.  Patient has been deemed not an anticoagulation candidate in the past.  5.  Coronary artery disease, history of prior myocardial infarction, PCI.  Continue aspirin therapy  6.  Mixed hyperlipidemia, continue lipid-lowering therapy  7.  History of CVA, on dual antiplatelet therapy.  She is allergic to clopidogrel.    She hopes to be discharged today.  Okay from our standpoint, she was last seen in our office in May 2018 and actually had an appointment to see me today.  We would like to see her back in the office in 1 week.    Floyd Musa III, MD  [facility]  12/18/19  8:22 AM

## 2019-12-18 NOTE — NURSING NOTE
Received phone call from PCA that patient had slid to the floor when trying to transfer to rolling walker to be taken home after being discharged, and that physician witnessed.  Found the patient sitting up in bed by the time RN arrived.  The patient denies and pain, dizziness, or headache.  Patient wishes to go home and physician agreeable to proceed with discharge at this time.

## 2019-12-19 ENCOUNTER — READMISSION MANAGEMENT (OUTPATIENT)
Dept: CALL CENTER | Facility: HOSPITAL | Age: 64
End: 2019-12-19

## 2019-12-19 NOTE — OUTREACH NOTE
Prep Survey      Responses   Facility patient discharged from?  [facility]   Is LACE score < 7 ?  No   Is patient eligible?  Yes   Discharge diagnosis  A/C hypoxic resp. failure d/t A/C diastolic CHF, severe pulmonary HTN, COPD, chronic PAF, HTN, CAD   Does the patient have one of the following disease processes/diagnoses(primary or secondary)?  CHF   Does the patient have Home health ordered?  Yes   What is the Home health agency?   [facility]   Is there a DME ordered?  Yes   What DME was ordered?  BSC   Comments regarding appointments  Pt to schedule follow up appointments   Prep survey completed?  Yes          Megan Ronquillo RN

## 2019-12-19 NOTE — NURSING NOTE
Case Management Discharge Note      Final Note: Discharged home.         Destination      No service has been selected for the patient.      Durable Medical Equipment      No service has been selected for the patient.      Dialysis/Infusion      No service has been selected for the patient.      Home Medical Care      No service has been selected for the patient.      Therapy      No service has been selected for the patient.      Community Resources      No service has been selected for the patient.             Final Discharge Disposition Code: 01 - home or self-care

## 2019-12-20 ENCOUNTER — READMISSION MANAGEMENT (OUTPATIENT)
Dept: CALL CENTER | Facility: HOSPITAL | Age: 64
End: 2019-12-20

## 2019-12-20 LAB — BACTERIA SPEC AEROBE CULT: ABNORMAL

## 2019-12-20 NOTE — OUTREACH NOTE
CHF Week 1 Survey      Responses   Facility patient discharged from?  [facility]   Does the patient have one of the following disease processes/diagnoses(primary or secondary)?  CHF   Is there a successful TCM telephone encounter documented?  No   CHF Week 1 attempt successful?  Yes   Call start time  1300   Call end time  1307   Discharge diagnosis  A/C hypoxic resp. failure d/t A/C diastolic CHF, severe pulmonary HTN, COPD, chronic PAF, HTN, CAD   Is patient permission given to speak with other caregiver?  Yes   List who call center can speak with     Meds reviewed with patient/caregiver?  Yes   Is the patient having any side effects they believe may be caused by any medication additions or changes?  No   Does the patient have all medications ordered at discharge?  Yes   Is the patient taking all medications as directed (includes completed medication regime)?  Yes   Does the patient have a primary care provider?   Yes   Does the patient have an appointment with their PCP within 7 days of discharge?  Yes   Has the patient kept scheduled appointments due by today?  N/A   What is the Home health agency?   [facility]   Has home health visited the patient within 72 hours of discharge?  Yes   What DME was ordered?  BSC   Has all DME been delivered?  Yes   Psychosocial issues?  No   Did the patient receive a copy of their discharge instructions?  Yes   Nursing interventions  Reviewed instructions with patient   What is the patient's perception of their health status since discharge?  Same   Is the patient weighing daily?  Yes   Does the patient have scales?  Yes   Is the patient able to teach back Heart Failure diet management?  No   Is the patient able to teach back Heart Failure Zones?  No   Is the patient able to teach back signs and symptoms of worsening condition? (i.e. weight gain, shortness of air, etc.)  Yes   Additional teach back comments  Pt is difficult to understand and says very little. Her  does weigh  her daily and takes her to appointments.    CHF Week 1 call completed?  Yes          Dior Dudley RN

## 2019-12-26 ENCOUNTER — APPOINTMENT (OUTPATIENT)
Dept: CT IMAGING | Facility: HOSPITAL | Age: 64
End: 2019-12-26

## 2019-12-26 ENCOUNTER — APPOINTMENT (OUTPATIENT)
Dept: GENERAL RADIOLOGY | Facility: HOSPITAL | Age: 64
End: 2019-12-26

## 2019-12-26 ENCOUNTER — HOSPITAL ENCOUNTER (INPATIENT)
Facility: HOSPITAL | Age: 64
LOS: 5 days | End: 2020-01-03
Attending: EMERGENCY MEDICINE | Admitting: HOSPITALIST

## 2019-12-26 DIAGNOSIS — R53.1 WEAKNESS: ICD-10-CM

## 2019-12-26 DIAGNOSIS — R41.0 CONFUSION: ICD-10-CM

## 2019-12-26 DIAGNOSIS — N39.0 URINARY TRACT INFECTION WITHOUT HEMATURIA, SITE UNSPECIFIED: Primary | ICD-10-CM

## 2019-12-26 DIAGNOSIS — S72.414A CLOSED NONDISPLACED FRACTURE OF CONDYLE OF RIGHT FEMUR, INITIAL ENCOUNTER (HCC): ICD-10-CM

## 2019-12-26 DIAGNOSIS — J44.9 CHRONIC OBSTRUCTIVE PULMONARY DISEASE, UNSPECIFIED COPD TYPE (HCC): ICD-10-CM

## 2019-12-26 LAB
ALBUMIN SERPL-MCNC: 3.3 G/DL (ref 3.5–5.2)
ALBUMIN/GLOB SERPL: 0.9 G/DL
ALP SERPL-CCNC: 169 U/L (ref 39–117)
ALT SERPL W P-5'-P-CCNC: 8 U/L (ref 1–33)
AMPHET+METHAMPHET UR QL: NEGATIVE
AMPHETAMINES UR QL: NEGATIVE
ANION GAP SERPL CALCULATED.3IONS-SCNC: 13.4 MMOL/L (ref 5–15)
APTT PPP: 35.8 SECONDS (ref 24.3–38.1)
AST SERPL-CCNC: 12 U/L (ref 1–32)
BACTERIA UR QL AUTO: ABNORMAL /HPF
BARBITURATES UR QL SCN: POSITIVE
BASOPHILS # BLD AUTO: 0.05 10*3/MM3 (ref 0–0.2)
BASOPHILS NFR BLD AUTO: 0.6 % (ref 0–1.5)
BENZODIAZ UR QL SCN: NEGATIVE
BILIRUB SERPL-MCNC: 0.3 MG/DL (ref 0.2–1.2)
BILIRUB UR QL STRIP: NEGATIVE
BUN BLD-MCNC: 22 MG/DL (ref 8–23)
BUN/CREAT SERPL: 15.9 (ref 7–25)
BUPRENORPHINE SERPL-MCNC: NEGATIVE NG/ML
CALCIUM SPEC-SCNC: 9 MG/DL (ref 8.6–10.5)
CANNABINOIDS SERPL QL: NEGATIVE
CARBAMAZEPINE SERPL-MCNC: 4.4 MCG/ML (ref 4–12)
CHLORIDE SERPL-SCNC: 98 MMOL/L (ref 98–107)
CK SERPL-CCNC: 37 U/L (ref 20–180)
CLARITY UR: ABNORMAL
CO2 SERPL-SCNC: 24.6 MMOL/L (ref 22–29)
COCAINE UR QL: NEGATIVE
COLOR UR: YELLOW
CREAT BLD-MCNC: 1.38 MG/DL (ref 0.57–1)
DEPRECATED RDW RBC AUTO: 56.8 FL (ref 37–54)
EOSINOPHIL # BLD AUTO: 0.02 10*3/MM3 (ref 0–0.4)
EOSINOPHIL NFR BLD AUTO: 0.2 % (ref 0.3–6.2)
ERYTHROCYTE [DISTWIDTH] IN BLOOD BY AUTOMATED COUNT: 18.8 % (ref 12.3–15.4)
ETHANOL BLD-MCNC: <10 MG/DL (ref 0–10)
ETHANOL UR QL: <0.01 %
FLUAV AG NPH QL: NEGATIVE
FLUBV AG NPH QL IA: NEGATIVE
GFR SERPL CREATININE-BSD FRML MDRD: 38 ML/MIN/1.73
GLOBULIN UR ELPH-MCNC: 3.6 GM/DL
GLUCOSE BLD-MCNC: 109 MG/DL (ref 65–99)
GLUCOSE UR STRIP-MCNC: NEGATIVE MG/DL
HCT VFR BLD AUTO: 32.8 % (ref 34–46.6)
HGB BLD-MCNC: 9.8 G/DL (ref 12–15.9)
HGB UR QL STRIP.AUTO: ABNORMAL
HYALINE CASTS UR QL AUTO: ABNORMAL /LPF
IMM GRANULOCYTES # BLD AUTO: 0.03 10*3/MM3 (ref 0–0.05)
IMM GRANULOCYTES NFR BLD AUTO: 0.3 % (ref 0–0.5)
INR PPP: 1.31 (ref 0.9–1.1)
KETONES UR QL STRIP: NEGATIVE
LEUKOCYTE ESTERASE UR QL STRIP.AUTO: NEGATIVE
LYMPHOCYTES # BLD AUTO: 0.39 10*3/MM3 (ref 0.7–3.1)
LYMPHOCYTES NFR BLD AUTO: 4.3 % (ref 19.6–45.3)
MCH RBC QN AUTO: 24.7 PG (ref 26.6–33)
MCHC RBC AUTO-ENTMCNC: 29.9 G/DL (ref 31.5–35.7)
MCV RBC AUTO: 82.6 FL (ref 79–97)
METHADONE UR QL SCN: NEGATIVE
MONOCYTES # BLD AUTO: 0.63 10*3/MM3 (ref 0.1–0.9)
MONOCYTES NFR BLD AUTO: 7 % (ref 5–12)
NEUTROPHILS # BLD AUTO: 7.91 10*3/MM3 (ref 1.7–7)
NEUTROPHILS NFR BLD AUTO: 87.6 % (ref 42.7–76)
NITRITE UR QL STRIP: POSITIVE
OPIATES UR QL: NEGATIVE
OXYCODONE UR QL SCN: NEGATIVE
PCP UR QL SCN: NEGATIVE
PH UR STRIP.AUTO: 5.5 [PH] (ref 4.5–8)
PLATELET # BLD AUTO: 175 10*3/MM3 (ref 140–450)
PMV BLD AUTO: 10.4 FL (ref 6–12)
POTASSIUM BLD-SCNC: 3.6 MMOL/L (ref 3.5–5.2)
PROPOXYPH UR QL: NEGATIVE
PROT SERPL-MCNC: 6.9 G/DL (ref 6–8.5)
PROT UR QL STRIP: ABNORMAL
PROTHROMBIN TIME: 16.1 SECONDS (ref 12.1–15)
RBC # BLD AUTO: 3.97 10*6/MM3 (ref 3.77–5.28)
RBC # UR: ABNORMAL /HPF
REF LAB TEST METHOD: ABNORMAL
SODIUM BLD-SCNC: 136 MMOL/L (ref 136–145)
SP GR UR STRIP: 1.02 (ref 1–1.03)
SQUAMOUS #/AREA URNS HPF: ABNORMAL /HPF
TRANS CELLS #/AREA URNS HPF: ABNORMAL /HPF
TRICYCLICS UR QL SCN: NEGATIVE
TROPONIN T SERPL-MCNC: 0.01 NG/ML (ref 0–0.03)
TSH SERPL DL<=0.05 MIU/L-ACNC: 1.23 UIU/ML (ref 0.27–4.2)
UROBILINOGEN UR QL STRIP: ABNORMAL
WBC NRBC COR # BLD: 9.03 10*3/MM3 (ref 3.4–10.8)
WBC UR QL AUTO: ABNORMAL /HPF

## 2019-12-26 PROCEDURE — 25010000002 PIPERACILLIN-TAZOBACTAM: Performed by: EMERGENCY MEDICINE

## 2019-12-26 PROCEDURE — P9612 CATHETERIZE FOR URINE SPEC: HCPCS

## 2019-12-26 PROCEDURE — 94640 AIRWAY INHALATION TREATMENT: CPT

## 2019-12-26 PROCEDURE — 80307 DRUG TEST PRSMV CHEM ANLYZR: CPT | Performed by: EMERGENCY MEDICINE

## 2019-12-26 PROCEDURE — 99284 EMERGENCY DEPT VISIT MOD MDM: CPT | Performed by: EMERGENCY MEDICINE

## 2019-12-26 PROCEDURE — 82550 ASSAY OF CK (CPK): CPT | Performed by: EMERGENCY MEDICINE

## 2019-12-26 PROCEDURE — 87804 INFLUENZA ASSAY W/OPTIC: CPT | Performed by: EMERGENCY MEDICINE

## 2019-12-26 PROCEDURE — 99219 PR INITIAL OBSERVATION CARE/DAY 50 MINUTES: CPT | Performed by: HOSPITALIST

## 2019-12-26 PROCEDURE — 81001 URINALYSIS AUTO W/SCOPE: CPT | Performed by: EMERGENCY MEDICINE

## 2019-12-26 PROCEDURE — G0378 HOSPITAL OBSERVATION PER HR: HCPCS

## 2019-12-26 PROCEDURE — 84484 ASSAY OF TROPONIN QUANT: CPT | Performed by: EMERGENCY MEDICINE

## 2019-12-26 PROCEDURE — 93005 ELECTROCARDIOGRAM TRACING: CPT | Performed by: EMERGENCY MEDICINE

## 2019-12-26 PROCEDURE — 73560 X-RAY EXAM OF KNEE 1 OR 2: CPT

## 2019-12-26 PROCEDURE — 85610 PROTHROMBIN TIME: CPT | Performed by: EMERGENCY MEDICINE

## 2019-12-26 PROCEDURE — 84443 ASSAY THYROID STIM HORMONE: CPT | Performed by: HOSPITALIST

## 2019-12-26 PROCEDURE — 80053 COMPREHEN METABOLIC PANEL: CPT | Performed by: EMERGENCY MEDICINE

## 2019-12-26 PROCEDURE — 73700 CT LOWER EXTREMITY W/O DYE: CPT

## 2019-12-26 PROCEDURE — 85730 THROMBOPLASTIN TIME PARTIAL: CPT | Performed by: EMERGENCY MEDICINE

## 2019-12-26 PROCEDURE — 71046 X-RAY EXAM CHEST 2 VIEWS: CPT

## 2019-12-26 PROCEDURE — 93010 ELECTROCARDIOGRAM REPORT: CPT | Performed by: INTERNAL MEDICINE

## 2019-12-26 PROCEDURE — 85025 COMPLETE CBC W/AUTO DIFF WBC: CPT | Performed by: EMERGENCY MEDICINE

## 2019-12-26 PROCEDURE — 25010000002 PIPERACILLIN SOD-TAZOBACTAM PER 1 G: Performed by: HOSPITALIST

## 2019-12-26 PROCEDURE — 99284 EMERGENCY DEPT VISIT MOD MDM: CPT

## 2019-12-26 PROCEDURE — 80156 ASSAY CARBAMAZEPINE TOTAL: CPT | Performed by: EMERGENCY MEDICINE

## 2019-12-26 PROCEDURE — 94799 UNLISTED PULMONARY SVC/PX: CPT

## 2019-12-26 RX ORDER — PRIMIDONE 250 MG/1
250 TABLET ORAL DAILY
Status: DISCONTINUED | OUTPATIENT
Start: 2019-12-27 | End: 2020-01-03 | Stop reason: HOSPADM

## 2019-12-26 RX ORDER — PANTOPRAZOLE SODIUM 40 MG/1
40 TABLET, DELAYED RELEASE ORAL EVERY MORNING
Status: DISCONTINUED | OUTPATIENT
Start: 2019-12-27 | End: 2020-01-03 | Stop reason: HOSPADM

## 2019-12-26 RX ORDER — ACETAMINOPHEN 160 MG/5ML
650 SOLUTION ORAL EVERY 4 HOURS PRN
Status: DISCONTINUED | OUTPATIENT
Start: 2019-12-26 | End: 2020-01-03 | Stop reason: HOSPADM

## 2019-12-26 RX ORDER — METOPROLOL SUCCINATE 50 MG/1
100 TABLET, EXTENDED RELEASE ORAL DAILY
Status: DISCONTINUED | OUTPATIENT
Start: 2019-12-27 | End: 2020-01-03 | Stop reason: HOSPADM

## 2019-12-26 RX ORDER — CARBAMAZEPINE 200 MG/1
200 TABLET ORAL 2 TIMES DAILY
Status: DISCONTINUED | OUTPATIENT
Start: 2019-12-26 | End: 2020-01-03 | Stop reason: HOSPADM

## 2019-12-26 RX ORDER — FUROSEMIDE 20 MG/1
20 TABLET ORAL NIGHTLY
Status: DISCONTINUED | OUTPATIENT
Start: 2019-12-27 | End: 2020-01-03 | Stop reason: HOSPADM

## 2019-12-26 RX ORDER — SODIUM CHLORIDE 0.9 % (FLUSH) 0.9 %
10 SYRINGE (ML) INJECTION AS NEEDED
Status: DISCONTINUED | OUTPATIENT
Start: 2019-12-26 | End: 2020-01-03 | Stop reason: HOSPADM

## 2019-12-26 RX ORDER — HYDROCODONE BITARTRATE AND ACETAMINOPHEN 5; 325 MG/1; MG/1
1 TABLET ORAL EVERY 4 HOURS PRN
Status: DISCONTINUED | OUTPATIENT
Start: 2019-12-26 | End: 2020-01-03 | Stop reason: HOSPADM

## 2019-12-26 RX ORDER — SODIUM CHLORIDE 0.9 % (FLUSH) 0.9 %
10 SYRINGE (ML) INJECTION EVERY 12 HOURS SCHEDULED
Status: DISCONTINUED | OUTPATIENT
Start: 2019-12-26 | End: 2020-01-03 | Stop reason: HOSPADM

## 2019-12-26 RX ORDER — ROSUVASTATIN CALCIUM 10 MG/1
40 TABLET, COATED ORAL NIGHTLY
Status: DISCONTINUED | OUTPATIENT
Start: 2019-12-26 | End: 2019-12-27

## 2019-12-26 RX ORDER — ACETAMINOPHEN 325 MG/1
650 TABLET ORAL EVERY 4 HOURS PRN
Status: DISCONTINUED | OUTPATIENT
Start: 2019-12-26 | End: 2020-01-03 | Stop reason: HOSPADM

## 2019-12-26 RX ORDER — IPRATROPIUM BROMIDE AND ALBUTEROL SULFATE 2.5; .5 MG/3ML; MG/3ML
3 SOLUTION RESPIRATORY (INHALATION)
Status: DISCONTINUED | OUTPATIENT
Start: 2019-12-26 | End: 2020-01-03 | Stop reason: HOSPADM

## 2019-12-26 RX ORDER — NITROGLYCERIN 0.4 MG/1
0.4 TABLET SUBLINGUAL
Status: DISCONTINUED | OUTPATIENT
Start: 2019-12-26 | End: 2020-01-03 | Stop reason: HOSPADM

## 2019-12-26 RX ORDER — ACETAMINOPHEN 650 MG/1
650 SUPPOSITORY RECTAL EVERY 4 HOURS PRN
Status: DISCONTINUED | OUTPATIENT
Start: 2019-12-26 | End: 2020-01-03 | Stop reason: HOSPADM

## 2019-12-26 RX ORDER — SODIUM CHLORIDE 9 MG/ML
INJECTION, SOLUTION INTRAVENOUS
Status: DISPENSED
Start: 2019-12-26 | End: 2019-12-27

## 2019-12-26 RX ORDER — SODIUM CHLORIDE 9 MG/ML
40 INJECTION, SOLUTION INTRAVENOUS AS NEEDED
Status: DISCONTINUED | OUTPATIENT
Start: 2019-12-26 | End: 2020-01-03 | Stop reason: HOSPADM

## 2019-12-26 RX ADMIN — IPRATROPIUM BROMIDE AND ALBUTEROL SULFATE 3 ML: .5; 3 SOLUTION RESPIRATORY (INHALATION) at 19:04

## 2019-12-26 RX ADMIN — PIPERACILLIN SODIUM,TAZOBACTAM SODIUM 3.38 G: 3; .375 INJECTION, POWDER, FOR SOLUTION INTRAVENOUS at 13:05

## 2019-12-26 RX ADMIN — HYDROCODONE BITARTRATE AND ACETAMINOPHEN 1 TABLET: 5; 325 TABLET ORAL at 21:11

## 2019-12-26 RX ADMIN — CARBAMAZEPINE 200 MG: 200 TABLET ORAL at 21:11

## 2019-12-26 RX ADMIN — SODIUM CHLORIDE, PRESERVATIVE FREE 10 ML: 5 INJECTION INTRAVENOUS at 21:13

## 2019-12-26 RX ADMIN — SODIUM CHLORIDE 3.38 G: 900 INJECTION, SOLUTION INTRAVENOUS at 21:11

## 2019-12-27 LAB
ANION GAP SERPL CALCULATED.3IONS-SCNC: 14.8 MMOL/L (ref 5–15)
BACTERIA UR QL AUTO: ABNORMAL /HPF
BILIRUB UR QL STRIP: NEGATIVE
BUN BLD-MCNC: 18 MG/DL (ref 8–23)
BUN/CREAT SERPL: 14.9 (ref 7–25)
CALCIUM SPEC-SCNC: 8.9 MG/DL (ref 8.6–10.5)
CHLORIDE SERPL-SCNC: 98 MMOL/L (ref 98–107)
CLARITY UR: CLEAR
CO2 SERPL-SCNC: 22.2 MMOL/L (ref 22–29)
COLOR UR: YELLOW
CREAT BLD-MCNC: 1.21 MG/DL (ref 0.57–1)
GFR SERPL CREATININE-BSD FRML MDRD: 45 ML/MIN/1.73
GLUCOSE BLD-MCNC: 106 MG/DL (ref 65–99)
GLUCOSE UR STRIP-MCNC: NEGATIVE MG/DL
HGB UR QL STRIP.AUTO: ABNORMAL
HYALINE CASTS UR QL AUTO: ABNORMAL /LPF
KETONES UR QL STRIP: NEGATIVE
LEUKOCYTE ESTERASE UR QL STRIP.AUTO: ABNORMAL
NITRITE UR QL STRIP: NEGATIVE
PH UR STRIP.AUTO: 6 [PH] (ref 4.5–8)
POTASSIUM BLD-SCNC: 3.4 MMOL/L (ref 3.5–5.2)
PROCALCITONIN SERPL-MCNC: 0.41 NG/ML (ref 0.1–0.25)
PROT UR QL STRIP: ABNORMAL
RBC # UR: ABNORMAL /HPF
REF LAB TEST METHOD: ABNORMAL
SODIUM BLD-SCNC: 135 MMOL/L (ref 136–145)
SP GR UR STRIP: 1.02 (ref 1–1.03)
SQUAMOUS #/AREA URNS HPF: ABNORMAL /HPF
UROBILINOGEN UR QL STRIP: ABNORMAL
WBC UR QL AUTO: ABNORMAL /HPF

## 2019-12-27 PROCEDURE — 25010000002 AZITHROMYCIN: Performed by: INTERNAL MEDICINE

## 2019-12-27 PROCEDURE — 82306 VITAMIN D 25 HYDROXY: CPT | Performed by: HOSPITALIST

## 2019-12-27 PROCEDURE — 25010000002 CEFTRIAXONE SODIUM-DEXTROSE 1-3.74 GM-%(50ML) RECONSTITUTED SOLUTION: Performed by: INTERNAL MEDICINE

## 2019-12-27 PROCEDURE — 94799 UNLISTED PULMONARY SVC/PX: CPT

## 2019-12-27 PROCEDURE — G0378 HOSPITAL OBSERVATION PER HR: HCPCS

## 2019-12-27 PROCEDURE — 25010000002 AZITHROMYCIN PER 500 MG

## 2019-12-27 PROCEDURE — 99226 PR SBSQ OBSERVATION CARE/DAY 35 MINUTES: CPT | Performed by: INTERNAL MEDICINE

## 2019-12-27 PROCEDURE — 84145 PROCALCITONIN (PCT): CPT | Performed by: INTERNAL MEDICINE

## 2019-12-27 PROCEDURE — 87086 URINE CULTURE/COLONY COUNT: CPT | Performed by: INTERNAL MEDICINE

## 2019-12-27 PROCEDURE — 25010000002 CEFTRIAXONE PER 250 MG

## 2019-12-27 PROCEDURE — 80048 BASIC METABOLIC PNL TOTAL CA: CPT | Performed by: HOSPITALIST

## 2019-12-27 PROCEDURE — 25010000002 AZITHROMYCIN PER 500 MG: Performed by: INTERNAL MEDICINE

## 2019-12-27 PROCEDURE — 81001 URINALYSIS AUTO W/SCOPE: CPT | Performed by: INTERNAL MEDICINE

## 2019-12-27 RX ORDER — DEXTROSE MONOHYDRATE 50 MG/ML
INJECTION, SOLUTION INTRAVENOUS
Status: COMPLETED
Start: 2019-12-27 | End: 2019-12-27

## 2019-12-27 RX ORDER — AZITHROMYCIN 500 MG/1
INJECTION, POWDER, LYOPHILIZED, FOR SOLUTION INTRAVENOUS
Status: COMPLETED
Start: 2019-12-27 | End: 2019-12-27

## 2019-12-27 RX ORDER — SODIUM CHLORIDE 9 MG/ML
INJECTION, SOLUTION INTRAVENOUS
Status: COMPLETED
Start: 2019-12-27 | End: 2019-12-27

## 2019-12-27 RX ORDER — ROSUVASTATIN CALCIUM 5 MG/1
40 TABLET, COATED ORAL NIGHTLY
Status: DISCONTINUED | OUTPATIENT
Start: 2019-12-27 | End: 2020-01-03 | Stop reason: HOSPADM

## 2019-12-27 RX ORDER — CEFTRIAXONE 1 G/1
INJECTION, POWDER, FOR SOLUTION INTRAMUSCULAR; INTRAVENOUS
Status: COMPLETED
Start: 2019-12-27 | End: 2019-12-27

## 2019-12-27 RX ORDER — CEFTRIAXONE 1 G/50ML
1 INJECTION, SOLUTION INTRAVENOUS EVERY 24 HOURS
Status: COMPLETED | OUTPATIENT
Start: 2019-12-27 | End: 2020-01-02

## 2019-12-27 RX ADMIN — CEFTRIAXONE 1 G: 1 INJECTION, SOLUTION INTRAVENOUS at 20:32

## 2019-12-27 RX ADMIN — AZITHROMYCIN MONOHYDRATE 500 MG: 500 INJECTION, POWDER, LYOPHILIZED, FOR SOLUTION INTRAVENOUS at 20:19

## 2019-12-27 RX ADMIN — IPRATROPIUM BROMIDE AND ALBUTEROL SULFATE 3 ML: .5; 3 SOLUTION RESPIRATORY (INHALATION) at 15:02

## 2019-12-27 RX ADMIN — PRIMIDONE 250 MG: 250 TABLET ORAL at 08:58

## 2019-12-27 RX ADMIN — IPRATROPIUM BROMIDE AND ALBUTEROL SULFATE 3 ML: .5; 3 SOLUTION RESPIRATORY (INHALATION) at 20:00

## 2019-12-27 RX ADMIN — FUROSEMIDE 20 MG: 20 TABLET ORAL at 20:27

## 2019-12-27 RX ADMIN — CARBAMAZEPINE 200 MG: 200 TABLET ORAL at 08:57

## 2019-12-27 RX ADMIN — SODIUM CHLORIDE, PRESERVATIVE FREE 10 ML: 5 INJECTION INTRAVENOUS at 20:28

## 2019-12-27 RX ADMIN — AZITHROMYCIN MONOHYDRATE 500 MG: 500 INJECTION, POWDER, LYOPHILIZED, FOR SOLUTION INTRAVENOUS at 20:24

## 2019-12-27 RX ADMIN — FUROSEMIDE 60 MG: 40 TABLET ORAL at 08:58

## 2019-12-27 RX ADMIN — IPRATROPIUM BROMIDE AND ALBUTEROL SULFATE 3 ML: .5; 3 SOLUTION RESPIRATORY (INHALATION) at 11:30

## 2019-12-27 RX ADMIN — SODIUM CHLORIDE, PRESERVATIVE FREE 10 ML: 5 INJECTION INTRAVENOUS at 08:59

## 2019-12-27 RX ADMIN — CEFTRIAXONE 1000 MG: 1 INJECTION, POWDER, FOR SOLUTION INTRAMUSCULAR; INTRAVENOUS at 20:32

## 2019-12-27 RX ADMIN — DEXTROSE MONOHYDRATE: 50 INJECTION, SOLUTION INTRAVENOUS at 20:31

## 2019-12-27 RX ADMIN — MICONAZOLE NITRATE: 20 CREAM TOPICAL at 20:24

## 2019-12-27 RX ADMIN — ROSUVASTATIN CALCIUM 40 MG: 5 TABLET, FILM COATED ORAL at 20:27

## 2019-12-27 RX ADMIN — IPRATROPIUM BROMIDE AND ALBUTEROL SULFATE 3 ML: .5; 3 SOLUTION RESPIRATORY (INHALATION) at 07:50

## 2019-12-27 RX ADMIN — METOPROLOL SUCCINATE 100 MG: 50 TABLET, EXTENDED RELEASE ORAL at 08:58

## 2019-12-27 RX ADMIN — CARBAMAZEPINE 200 MG: 200 TABLET ORAL at 20:28

## 2019-12-27 RX ADMIN — SODIUM CHLORIDE: 900 INJECTION, SOLUTION INTRAVENOUS at 20:31

## 2019-12-27 RX ADMIN — MICONAZOLE NITRATE: 20 CREAM TOPICAL at 12:13

## 2019-12-27 RX ADMIN — PANTOPRAZOLE SODIUM 40 MG: 40 TABLET, DELAYED RELEASE ORAL at 05:44

## 2019-12-28 ENCOUNTER — APPOINTMENT (OUTPATIENT)
Dept: CT IMAGING | Facility: HOSPITAL | Age: 64
End: 2019-12-28

## 2019-12-28 LAB
ANION GAP SERPL CALCULATED.3IONS-SCNC: 15.5 MMOL/L (ref 5–15)
BACTERIA SPEC AEROBE CULT: ABNORMAL
BASOPHILS # BLD AUTO: 0.04 10*3/MM3 (ref 0–0.2)
BASOPHILS NFR BLD AUTO: 0.5 % (ref 0–1.5)
BUN BLD-MCNC: 13 MG/DL (ref 8–23)
BUN/CREAT SERPL: 11.9 (ref 7–25)
CALCIUM SPEC-SCNC: 8.6 MG/DL (ref 8.6–10.5)
CHLORIDE SERPL-SCNC: 93 MMOL/L (ref 98–107)
CO2 SERPL-SCNC: 23.5 MMOL/L (ref 22–29)
CREAT BLD-MCNC: 1.09 MG/DL (ref 0.57–1)
DEPRECATED RDW RBC AUTO: 54.9 FL (ref 37–54)
EOSINOPHIL # BLD AUTO: 0.13 10*3/MM3 (ref 0–0.4)
EOSINOPHIL NFR BLD AUTO: 1.5 % (ref 0.3–6.2)
ERYTHROCYTE [DISTWIDTH] IN BLOOD BY AUTOMATED COUNT: 18.7 % (ref 12.3–15.4)
GFR SERPL CREATININE-BSD FRML MDRD: 51 ML/MIN/1.73
GLUCOSE BLD-MCNC: 105 MG/DL (ref 65–99)
HCT VFR BLD AUTO: 31.7 % (ref 34–46.6)
HGB BLD-MCNC: 9.8 G/DL (ref 12–15.9)
IMM GRANULOCYTES # BLD AUTO: 0.06 10*3/MM3 (ref 0–0.05)
IMM GRANULOCYTES NFR BLD AUTO: 0.7 % (ref 0–0.5)
LYMPHOCYTES # BLD AUTO: 0.9 10*3/MM3 (ref 0.7–3.1)
LYMPHOCYTES NFR BLD AUTO: 10.5 % (ref 19.6–45.3)
MAGNESIUM SERPL-MCNC: 1.5 MG/DL (ref 1.6–2.4)
MCH RBC QN AUTO: 25 PG (ref 26.6–33)
MCHC RBC AUTO-ENTMCNC: 30.9 G/DL (ref 31.5–35.7)
MCV RBC AUTO: 80.9 FL (ref 79–97)
MONOCYTES # BLD AUTO: 0.91 10*3/MM3 (ref 0.1–0.9)
MONOCYTES NFR BLD AUTO: 10.6 % (ref 5–12)
NEUTROPHILS # BLD AUTO: 6.54 10*3/MM3 (ref 1.7–7)
NEUTROPHILS NFR BLD AUTO: 76.2 % (ref 42.7–76)
NRBC BLD AUTO-RTO: 0 /100 WBC (ref 0–0.2)
NT-PROBNP SERPL-MCNC: ABNORMAL PG/ML (ref 5–900)
PHOSPHATE SERPL-MCNC: 3.1 MG/DL (ref 2.5–4.5)
PLATELET # BLD AUTO: 164 10*3/MM3 (ref 140–450)
PMV BLD AUTO: 11.2 FL (ref 6–12)
POTASSIUM BLD-SCNC: 3.3 MMOL/L (ref 3.5–5.2)
PROCALCITONIN SERPL-MCNC: 0.33 NG/ML (ref 0.1–0.25)
RBC # BLD AUTO: 3.92 10*6/MM3 (ref 3.77–5.28)
SODIUM BLD-SCNC: 132 MMOL/L (ref 136–145)
WBC NRBC COR # BLD: 8.58 10*3/MM3 (ref 3.4–10.8)

## 2019-12-28 PROCEDURE — 83735 ASSAY OF MAGNESIUM: CPT | Performed by: INTERNAL MEDICINE

## 2019-12-28 PROCEDURE — L1832 KO ADJ JNT POS R SUP PRE CST: HCPCS

## 2019-12-28 PROCEDURE — G0378 HOSPITAL OBSERVATION PER HR: HCPCS

## 2019-12-28 PROCEDURE — 71275 CT ANGIOGRAPHY CHEST: CPT

## 2019-12-28 PROCEDURE — 99226 PR SBSQ OBSERVATION CARE/DAY 35 MINUTES: CPT | Performed by: INTERNAL MEDICINE

## 2019-12-28 PROCEDURE — 80048 BASIC METABOLIC PNL TOTAL CA: CPT | Performed by: INTERNAL MEDICINE

## 2019-12-28 PROCEDURE — 83880 ASSAY OF NATRIURETIC PEPTIDE: CPT | Performed by: INTERNAL MEDICINE

## 2019-12-28 PROCEDURE — 94799 UNLISTED PULMONARY SVC/PX: CPT

## 2019-12-28 PROCEDURE — 25010000002 CEFTRIAXONE SODIUM-DEXTROSE 1-3.74 GM-%(50ML) RECONSTITUTED SOLUTION: Performed by: INTERNAL MEDICINE

## 2019-12-28 PROCEDURE — 85025 COMPLETE CBC W/AUTO DIFF WBC: CPT | Performed by: INTERNAL MEDICINE

## 2019-12-28 PROCEDURE — 97161 PT EVAL LOW COMPLEX 20 MIN: CPT | Performed by: PHYSICAL THERAPIST

## 2019-12-28 PROCEDURE — 84145 PROCALCITONIN (PCT): CPT | Performed by: INTERNAL MEDICINE

## 2019-12-28 PROCEDURE — 83930 ASSAY OF BLOOD OSMOLALITY: CPT | Performed by: HOSPITALIST

## 2019-12-28 PROCEDURE — 27508 TREATMENT OF THIGH FRACTURE: CPT | Performed by: ORTHOPAEDIC SURGERY

## 2019-12-28 PROCEDURE — 84100 ASSAY OF PHOSPHORUS: CPT | Performed by: INTERNAL MEDICINE

## 2019-12-28 PROCEDURE — 99254 IP/OBS CNSLTJ NEW/EST MOD 60: CPT | Performed by: ORTHOPAEDIC SURGERY

## 2019-12-28 PROCEDURE — 0 IOPAMIDOL PER 1 ML: Performed by: HOSPITALIST

## 2019-12-28 RX ORDER — POTASSIUM CHLORIDE 20 MEQ/1
40 TABLET, EXTENDED RELEASE ORAL 2 TIMES DAILY WITH MEALS
Status: COMPLETED | OUTPATIENT
Start: 2019-12-28 | End: 2019-12-28

## 2019-12-28 RX ADMIN — MICONAZOLE NITRATE: 20 POWDER TOPICAL at 20:26

## 2019-12-28 RX ADMIN — SODIUM CHLORIDE, PRESERVATIVE FREE 10 ML: 5 INJECTION INTRAVENOUS at 20:27

## 2019-12-28 RX ADMIN — CEFTRIAXONE 1 G: 1 INJECTION, SOLUTION INTRAVENOUS at 20:26

## 2019-12-28 RX ADMIN — METOPROLOL SUCCINATE 100 MG: 50 TABLET, EXTENDED RELEASE ORAL at 08:30

## 2019-12-28 RX ADMIN — AZITHROMYCIN MONOHYDRATE 500 MG: 500 INJECTION, POWDER, LYOPHILIZED, FOR SOLUTION INTRAVENOUS at 19:18

## 2019-12-28 RX ADMIN — POTASSIUM CHLORIDE 40 MEQ: 1500 TABLET, EXTENDED RELEASE ORAL at 11:49

## 2019-12-28 RX ADMIN — CARBAMAZEPINE 200 MG: 200 TABLET ORAL at 20:27

## 2019-12-28 RX ADMIN — IPRATROPIUM BROMIDE AND ALBUTEROL SULFATE 3 ML: .5; 3 SOLUTION RESPIRATORY (INHALATION) at 11:07

## 2019-12-28 RX ADMIN — IPRATROPIUM BROMIDE AND ALBUTEROL SULFATE 3 ML: .5; 3 SOLUTION RESPIRATORY (INHALATION) at 16:25

## 2019-12-28 RX ADMIN — MAGNESIUM OXIDE TAB 400 MG (241.3 MG ELEMENTAL MG) 400 MG: 400 (241.3 MG) TAB at 09:23

## 2019-12-28 RX ADMIN — PANTOPRAZOLE SODIUM 40 MG: 40 TABLET, DELAYED RELEASE ORAL at 06:15

## 2019-12-28 RX ADMIN — PRIMIDONE 250 MG: 250 TABLET ORAL at 08:29

## 2019-12-28 RX ADMIN — MICONAZOLE NITRATE 1 APPLICATION: 20 CREAM TOPICAL at 08:30

## 2019-12-28 RX ADMIN — IPRATROPIUM BROMIDE AND ALBUTEROL SULFATE 3 ML: .5; 3 SOLUTION RESPIRATORY (INHALATION) at 07:06

## 2019-12-28 RX ADMIN — ROSUVASTATIN CALCIUM 40 MG: 5 TABLET, FILM COATED ORAL at 20:27

## 2019-12-28 RX ADMIN — CARBAMAZEPINE 200 MG: 200 TABLET ORAL at 08:30

## 2019-12-28 RX ADMIN — FUROSEMIDE 20 MG: 20 TABLET ORAL at 20:27

## 2019-12-28 RX ADMIN — IPRATROPIUM BROMIDE AND ALBUTEROL SULFATE 3 ML: .5; 3 SOLUTION RESPIRATORY (INHALATION) at 20:09

## 2019-12-28 RX ADMIN — FUROSEMIDE 60 MG: 40 TABLET ORAL at 08:29

## 2019-12-28 RX ADMIN — IOPAMIDOL 100 ML: 755 INJECTION, SOLUTION INTRAVENOUS at 13:54

## 2019-12-28 RX ADMIN — SODIUM CHLORIDE, PRESERVATIVE FREE 10 ML: 5 INJECTION INTRAVENOUS at 08:30

## 2019-12-29 PROCEDURE — 99232 SBSQ HOSP IP/OBS MODERATE 35: CPT | Performed by: INTERNAL MEDICINE

## 2019-12-29 PROCEDURE — 94799 UNLISTED PULMONARY SVC/PX: CPT

## 2019-12-29 PROCEDURE — 97110 THERAPEUTIC EXERCISES: CPT

## 2019-12-29 PROCEDURE — 25010000002 CEFTRIAXONE SODIUM-DEXTROSE 1-3.74 GM-%(50ML) RECONSTITUTED SOLUTION: Performed by: INTERNAL MEDICINE

## 2019-12-29 RX ORDER — CHOLECALCIFEROL (VITAMIN D3) 125 MCG
CAPSULE ORAL
Status: COMPLETED
Start: 2019-12-29 | End: 2019-12-29

## 2019-12-29 RX ORDER — CHOLECALCIFEROL (VITAMIN D3) 125 MCG
5 CAPSULE ORAL NIGHTLY PRN
Status: DISCONTINUED | OUTPATIENT
Start: 2019-12-29 | End: 2020-01-03 | Stop reason: HOSPADM

## 2019-12-29 RX ADMIN — IPRATROPIUM BROMIDE AND ALBUTEROL SULFATE 3 ML: .5; 3 SOLUTION RESPIRATORY (INHALATION) at 19:31

## 2019-12-29 RX ADMIN — FUROSEMIDE 20 MG: 20 TABLET ORAL at 20:00

## 2019-12-29 RX ADMIN — SODIUM CHLORIDE, PRESERVATIVE FREE 10 ML: 5 INJECTION INTRAVENOUS at 20:04

## 2019-12-29 RX ADMIN — AZITHROMYCIN MONOHYDRATE 500 MG: 500 INJECTION, POWDER, LYOPHILIZED, FOR SOLUTION INTRAVENOUS at 20:09

## 2019-12-29 RX ADMIN — CARBAMAZEPINE 200 MG: 200 TABLET ORAL at 08:32

## 2019-12-29 RX ADMIN — SODIUM CHLORIDE, PRESERVATIVE FREE 10 ML: 5 INJECTION INTRAVENOUS at 08:33

## 2019-12-29 RX ADMIN — ROSUVASTATIN CALCIUM 40 MG: 5 TABLET, FILM COATED ORAL at 20:00

## 2019-12-29 RX ADMIN — PANTOPRAZOLE SODIUM 40 MG: 40 TABLET, DELAYED RELEASE ORAL at 06:08

## 2019-12-29 RX ADMIN — FUROSEMIDE 60 MG: 40 TABLET ORAL at 08:32

## 2019-12-29 RX ADMIN — METOPROLOL SUCCINATE 100 MG: 50 TABLET, EXTENDED RELEASE ORAL at 08:32

## 2019-12-29 RX ADMIN — HYDROCODONE BITARTRATE AND ACETAMINOPHEN 1 TABLET: 5; 325 TABLET ORAL at 13:21

## 2019-12-29 RX ADMIN — IPRATROPIUM BROMIDE AND ALBUTEROL SULFATE 3 ML: .5; 3 SOLUTION RESPIRATORY (INHALATION) at 11:56

## 2019-12-29 RX ADMIN — CEFTRIAXONE 1 G: 1 INJECTION, SOLUTION INTRAVENOUS at 19:36

## 2019-12-29 RX ADMIN — MICONAZOLE NITRATE: 20 POWDER TOPICAL at 20:05

## 2019-12-29 RX ADMIN — IPRATROPIUM BROMIDE AND ALBUTEROL SULFATE 3 ML: .5; 3 SOLUTION RESPIRATORY (INHALATION) at 08:05

## 2019-12-29 RX ADMIN — IPRATROPIUM BROMIDE AND ALBUTEROL SULFATE 3 ML: .5; 3 SOLUTION RESPIRATORY (INHALATION) at 15:46

## 2019-12-29 RX ADMIN — MELATONIN TAB 5 MG 5 MG: 5 TAB at 20:56

## 2019-12-29 RX ADMIN — MICONAZOLE NITRATE: 20 POWDER TOPICAL at 08:33

## 2019-12-29 RX ADMIN — CARBAMAZEPINE 200 MG: 200 TABLET ORAL at 20:00

## 2019-12-29 RX ADMIN — PRIMIDONE 250 MG: 250 TABLET ORAL at 08:32

## 2019-12-30 ENCOUNTER — APPOINTMENT (OUTPATIENT)
Dept: PULMONOLOGY | Facility: HOSPITAL | Age: 64
End: 2019-12-30

## 2019-12-30 LAB
25(OH)D3 SERPL-MCNC: 22.1 NG/ML (ref 30–100)
ANION GAP SERPL CALCULATED.3IONS-SCNC: 11.9 MMOL/L (ref 5–15)
BASOPHILS # BLD AUTO: 0.06 10*3/MM3 (ref 0–0.2)
BASOPHILS NFR BLD AUTO: 0.6 % (ref 0–1.5)
BUN BLD-MCNC: 18 MG/DL (ref 8–23)
BUN/CREAT SERPL: 15.5 (ref 7–25)
CALCIUM SPEC-SCNC: 8.3 MG/DL (ref 8.6–10.5)
CHLORIDE SERPL-SCNC: 92 MMOL/L (ref 98–107)
CO2 SERPL-SCNC: 24.1 MMOL/L (ref 22–29)
CREAT BLD-MCNC: 1.16 MG/DL (ref 0.57–1)
DEPRECATED RDW RBC AUTO: 55 FL (ref 37–54)
EOSINOPHIL # BLD AUTO: 0.39 10*3/MM3 (ref 0–0.4)
EOSINOPHIL NFR BLD AUTO: 4 % (ref 0.3–6.2)
ERYTHROCYTE [DISTWIDTH] IN BLOOD BY AUTOMATED COUNT: 19 % (ref 12.3–15.4)
GFR SERPL CREATININE-BSD FRML MDRD: 47 ML/MIN/1.73
GLUCOSE BLD-MCNC: 129 MG/DL (ref 65–99)
HCT VFR BLD AUTO: 30.4 % (ref 34–46.6)
HGB BLD-MCNC: 9.4 G/DL (ref 12–15.9)
IMM GRANULOCYTES # BLD AUTO: 0.08 10*3/MM3 (ref 0–0.05)
IMM GRANULOCYTES NFR BLD AUTO: 0.8 % (ref 0–0.5)
LYMPHOCYTES # BLD AUTO: 1.01 10*3/MM3 (ref 0.7–3.1)
LYMPHOCYTES NFR BLD AUTO: 10.3 % (ref 19.6–45.3)
MAGNESIUM SERPL-MCNC: 1.6 MG/DL (ref 1.6–2.4)
MCH RBC QN AUTO: 24.8 PG (ref 26.6–33)
MCHC RBC AUTO-ENTMCNC: 30.9 G/DL (ref 31.5–35.7)
MCV RBC AUTO: 80.2 FL (ref 79–97)
MONOCYTES # BLD AUTO: 0.75 10*3/MM3 (ref 0.1–0.9)
MONOCYTES NFR BLD AUTO: 7.6 % (ref 5–12)
NEUTROPHILS # BLD AUTO: 7.56 10*3/MM3 (ref 1.7–7)
NEUTROPHILS NFR BLD AUTO: 76.7 % (ref 42.7–76)
NRBC BLD AUTO-RTO: 0 /100 WBC (ref 0–0.2)
OSMOLALITY SERPL: 278 MOSM/KG (ref 280–301)
PLATELET # BLD AUTO: 181 10*3/MM3 (ref 140–450)
PMV BLD AUTO: 10.7 FL (ref 6–12)
POTASSIUM BLD-SCNC: 3.6 MMOL/L (ref 3.5–5.2)
RBC # BLD AUTO: 3.79 10*6/MM3 (ref 3.77–5.28)
SODIUM BLD-SCNC: 128 MMOL/L (ref 136–145)
WBC NRBC COR # BLD: 9.85 10*3/MM3 (ref 3.4–10.8)

## 2019-12-30 PROCEDURE — 97530 THERAPEUTIC ACTIVITIES: CPT

## 2019-12-30 PROCEDURE — 94799 UNLISTED PULMONARY SVC/PX: CPT

## 2019-12-30 PROCEDURE — 80048 BASIC METABOLIC PNL TOTAL CA: CPT | Performed by: INTERNAL MEDICINE

## 2019-12-30 PROCEDURE — 25010000002 AZITHROMYCIN PER 500 MG: Performed by: INTERNAL MEDICINE

## 2019-12-30 PROCEDURE — 86038 ANTINUCLEAR ANTIBODIES: CPT | Performed by: INTERNAL MEDICINE

## 2019-12-30 PROCEDURE — 99232 SBSQ HOSP IP/OBS MODERATE 35: CPT | Performed by: HOSPITALIST

## 2019-12-30 PROCEDURE — 86256 FLUORESCENT ANTIBODY TITER: CPT | Performed by: INTERNAL MEDICINE

## 2019-12-30 PROCEDURE — 83735 ASSAY OF MAGNESIUM: CPT | Performed by: INTERNAL MEDICINE

## 2019-12-30 PROCEDURE — 86160 COMPLEMENT ANTIGEN: CPT | Performed by: INTERNAL MEDICINE

## 2019-12-30 PROCEDURE — 83520 IMMUNOASSAY QUANT NOS NONAB: CPT | Performed by: INTERNAL MEDICINE

## 2019-12-30 PROCEDURE — 86431 RHEUMATOID FACTOR QUANT: CPT | Performed by: INTERNAL MEDICINE

## 2019-12-30 PROCEDURE — 85025 COMPLETE CBC W/AUTO DIFF WBC: CPT | Performed by: INTERNAL MEDICINE

## 2019-12-30 PROCEDURE — 86200 CCP ANTIBODY: CPT | Performed by: INTERNAL MEDICINE

## 2019-12-30 PROCEDURE — 94060 EVALUATION OF WHEEZING: CPT

## 2019-12-30 PROCEDURE — 25010000002 CEFTRIAXONE SODIUM-DEXTROSE 1-3.74 GM-%(50ML) RECONSTITUTED SOLUTION: Performed by: INTERNAL MEDICINE

## 2019-12-30 RX ORDER — CALCIUM CARBONATE 200(500)MG
2 TABLET,CHEWABLE ORAL 3 TIMES DAILY PRN
Status: DISCONTINUED | OUTPATIENT
Start: 2019-12-30 | End: 2020-01-03 | Stop reason: HOSPADM

## 2019-12-30 RX ORDER — CALCIUM CARBONATE 200(500)MG
TABLET,CHEWABLE ORAL
Status: COMPLETED
Start: 2019-12-30 | End: 2019-12-30

## 2019-12-30 RX ADMIN — MELATONIN TAB 5 MG 5 MG: 5 TAB at 20:29

## 2019-12-30 RX ADMIN — METOPROLOL SUCCINATE 100 MG: 50 TABLET, EXTENDED RELEASE ORAL at 08:14

## 2019-12-30 RX ADMIN — CARBAMAZEPINE 200 MG: 200 TABLET ORAL at 20:29

## 2019-12-30 RX ADMIN — PRIMIDONE 250 MG: 250 TABLET ORAL at 08:15

## 2019-12-30 RX ADMIN — FUROSEMIDE 20 MG: 20 TABLET ORAL at 20:29

## 2019-12-30 RX ADMIN — MICONAZOLE NITRATE: 20 POWDER TOPICAL at 08:16

## 2019-12-30 RX ADMIN — ANTACID TABLETS 2 TABLET: 500 TABLET, CHEWABLE ORAL at 20:29

## 2019-12-30 RX ADMIN — MICONAZOLE NITRATE: 20 POWDER TOPICAL at 20:14

## 2019-12-30 RX ADMIN — IPRATROPIUM BROMIDE AND ALBUTEROL SULFATE 3 ML: .5; 3 SOLUTION RESPIRATORY (INHALATION) at 16:07

## 2019-12-30 RX ADMIN — CEFTRIAXONE 1 G: 1 INJECTION, SOLUTION INTRAVENOUS at 20:10

## 2019-12-30 RX ADMIN — ROSUVASTATIN CALCIUM 40 MG: 5 TABLET, FILM COATED ORAL at 20:29

## 2019-12-30 RX ADMIN — IPRATROPIUM BROMIDE AND ALBUTEROL SULFATE 3 ML: .5; 3 SOLUTION RESPIRATORY (INHALATION) at 19:51

## 2019-12-30 RX ADMIN — FUROSEMIDE 60 MG: 40 TABLET ORAL at 08:15

## 2019-12-30 RX ADMIN — CARBAMAZEPINE 200 MG: 200 TABLET ORAL at 08:30

## 2019-12-30 RX ADMIN — Medication 2 TABLET: at 20:29

## 2019-12-30 RX ADMIN — IPRATROPIUM BROMIDE AND ALBUTEROL SULFATE 3 ML: .5; 3 SOLUTION RESPIRATORY (INHALATION) at 07:35

## 2019-12-30 RX ADMIN — SODIUM CHLORIDE, PRESERVATIVE FREE 10 ML: 5 INJECTION INTRAVENOUS at 08:16

## 2019-12-30 RX ADMIN — SODIUM CHLORIDE, PRESERVATIVE FREE 10 ML: 5 INJECTION INTRAVENOUS at 20:10

## 2019-12-30 RX ADMIN — PANTOPRAZOLE SODIUM 40 MG: 40 TABLET, DELAYED RELEASE ORAL at 08:15

## 2019-12-30 RX ADMIN — AZITHROMYCIN MONOHYDRATE 500 MG: 500 INJECTION, POWDER, LYOPHILIZED, FOR SOLUTION INTRAVENOUS at 20:38

## 2019-12-30 RX ADMIN — IPRATROPIUM BROMIDE AND ALBUTEROL SULFATE 3 ML: .5; 3 SOLUTION RESPIRATORY (INHALATION) at 12:45

## 2019-12-30 RX ADMIN — MICONAZOLE NITRATE: 20 CREAM TOPICAL at 20:14

## 2019-12-31 LAB
ANA SER QL: NEGATIVE
ANION GAP SERPL CALCULATED.3IONS-SCNC: 12.5 MMOL/L (ref 5–15)
BUN BLD-MCNC: 18 MG/DL (ref 8–23)
BUN/CREAT SERPL: 17 (ref 7–25)
C3 SERPL-MCNC: 139 MG/DL (ref 82–167)
C4 SERPL-MCNC: 20 MG/DL (ref 14–44)
CALCIUM SPEC-SCNC: 8.6 MG/DL (ref 8.6–10.5)
CCP IGA+IGG SERPL IA-ACNC: 10 UNITS (ref 0–19)
CHLORIDE SERPL-SCNC: 90 MMOL/L (ref 98–107)
CO2 SERPL-SCNC: 23.5 MMOL/L (ref 22–29)
CREAT BLD-MCNC: 1.06 MG/DL (ref 0.57–1)
GFR SERPL CREATININE-BSD FRML MDRD: 52 ML/MIN/1.73
GLUCOSE BLD-MCNC: 113 MG/DL (ref 65–99)
POTASSIUM BLD-SCNC: 3.8 MMOL/L (ref 3.5–5.2)
RA LATEX TURBID: 11.1 IU/ML (ref 0–13.9)
SODIUM BLD-SCNC: 126 MMOL/L (ref 136–145)

## 2019-12-31 PROCEDURE — 94799 UNLISTED PULMONARY SVC/PX: CPT

## 2019-12-31 PROCEDURE — 97110 THERAPEUTIC EXERCISES: CPT

## 2019-12-31 PROCEDURE — 80048 BASIC METABOLIC PNL TOTAL CA: CPT | Performed by: HOSPITALIST

## 2019-12-31 PROCEDURE — 99232 SBSQ HOSP IP/OBS MODERATE 35: CPT | Performed by: HOSPITALIST

## 2019-12-31 PROCEDURE — 25010000002 CEFTRIAXONE SODIUM-DEXTROSE 1-3.74 GM-%(50ML) RECONSTITUTED SOLUTION: Performed by: INTERNAL MEDICINE

## 2019-12-31 RX ADMIN — PANTOPRAZOLE SODIUM 40 MG: 40 TABLET, DELAYED RELEASE ORAL at 06:02

## 2019-12-31 RX ADMIN — MICONAZOLE NITRATE: 20 CREAM TOPICAL at 21:30

## 2019-12-31 RX ADMIN — METOPROLOL SUCCINATE 100 MG: 50 TABLET, EXTENDED RELEASE ORAL at 09:14

## 2019-12-31 RX ADMIN — IPRATROPIUM BROMIDE AND ALBUTEROL SULFATE 3 ML: .5; 3 SOLUTION RESPIRATORY (INHALATION) at 11:10

## 2019-12-31 RX ADMIN — FUROSEMIDE 20 MG: 20 TABLET ORAL at 21:09

## 2019-12-31 RX ADMIN — CEFTRIAXONE 1 G: 1 INJECTION, SOLUTION INTRAVENOUS at 21:04

## 2019-12-31 RX ADMIN — HYDROCODONE BITARTRATE AND ACETAMINOPHEN 1 TABLET: 5; 325 TABLET ORAL at 21:17

## 2019-12-31 RX ADMIN — MICONAZOLE NITRATE: 20 POWDER TOPICAL at 09:13

## 2019-12-31 RX ADMIN — MICONAZOLE NITRATE: 20 CREAM TOPICAL at 09:13

## 2019-12-31 RX ADMIN — CARBAMAZEPINE 200 MG: 200 TABLET ORAL at 21:09

## 2019-12-31 RX ADMIN — FUROSEMIDE 60 MG: 40 TABLET ORAL at 09:13

## 2019-12-31 RX ADMIN — CARBAMAZEPINE 200 MG: 200 TABLET ORAL at 09:14

## 2019-12-31 RX ADMIN — SODIUM CHLORIDE, PRESERVATIVE FREE 10 ML: 5 INJECTION INTRAVENOUS at 21:30

## 2019-12-31 RX ADMIN — IPRATROPIUM BROMIDE AND ALBUTEROL SULFATE 3 ML: .5; 3 SOLUTION RESPIRATORY (INHALATION) at 07:34

## 2019-12-31 RX ADMIN — IPRATROPIUM BROMIDE AND ALBUTEROL SULFATE 3 ML: .5; 3 SOLUTION RESPIRATORY (INHALATION) at 20:07

## 2019-12-31 RX ADMIN — PRIMIDONE 250 MG: 250 TABLET ORAL at 09:13

## 2019-12-31 RX ADMIN — SODIUM CHLORIDE, PRESERVATIVE FREE 10 ML: 5 INJECTION INTRAVENOUS at 09:12

## 2019-12-31 RX ADMIN — ROSUVASTATIN CALCIUM 40 MG: 5 TABLET, FILM COATED ORAL at 21:09

## 2019-12-31 RX ADMIN — IPRATROPIUM BROMIDE AND ALBUTEROL SULFATE 3 ML: .5; 3 SOLUTION RESPIRATORY (INHALATION) at 15:17

## 2020-01-01 LAB
ANION GAP SERPL CALCULATED.3IONS-SCNC: 15.4 MMOL/L (ref 5–15)
BUN BLD-MCNC: 22 MG/DL (ref 8–23)
BUN/CREAT SERPL: 18.8 (ref 7–25)
CALCIUM SPEC-SCNC: 8.8 MG/DL (ref 8.6–10.5)
CHLORIDE SERPL-SCNC: 87 MMOL/L (ref 98–107)
CO2 SERPL-SCNC: 23.6 MMOL/L (ref 22–29)
CREAT BLD-MCNC: 1.17 MG/DL (ref 0.57–1)
GFR SERPL CREATININE-BSD FRML MDRD: 47 ML/MIN/1.73
GLUCOSE BLD-MCNC: 89 MG/DL (ref 65–99)
POTASSIUM BLD-SCNC: 3.6 MMOL/L (ref 3.5–5.2)
SODIUM BLD-SCNC: 126 MMOL/L (ref 136–145)

## 2020-01-01 PROCEDURE — 94799 UNLISTED PULMONARY SVC/PX: CPT

## 2020-01-01 PROCEDURE — 80048 BASIC METABOLIC PNL TOTAL CA: CPT | Performed by: HOSPITALIST

## 2020-01-01 PROCEDURE — 99232 SBSQ HOSP IP/OBS MODERATE 35: CPT | Performed by: HOSPITALIST

## 2020-01-01 PROCEDURE — 97116 GAIT TRAINING THERAPY: CPT

## 2020-01-01 PROCEDURE — 25010000002 CEFTRIAXONE SODIUM-DEXTROSE 1-3.74 GM-%(50ML) RECONSTITUTED SOLUTION: Performed by: INTERNAL MEDICINE

## 2020-01-01 RX ADMIN — PRIMIDONE 250 MG: 250 TABLET ORAL at 09:34

## 2020-01-01 RX ADMIN — MELATONIN TAB 5 MG 5 MG: 5 TAB at 20:29

## 2020-01-01 RX ADMIN — IPRATROPIUM BROMIDE AND ALBUTEROL SULFATE 3 ML: .5; 3 SOLUTION RESPIRATORY (INHALATION) at 11:11

## 2020-01-01 RX ADMIN — SODIUM CHLORIDE, PRESERVATIVE FREE 10 ML: 5 INJECTION INTRAVENOUS at 09:36

## 2020-01-01 RX ADMIN — CEFTRIAXONE 1 G: 1 INJECTION, SOLUTION INTRAVENOUS at 20:35

## 2020-01-01 RX ADMIN — IPRATROPIUM BROMIDE AND ALBUTEROL SULFATE 3 ML: .5; 3 SOLUTION RESPIRATORY (INHALATION) at 19:09

## 2020-01-01 RX ADMIN — METOPROLOL SUCCINATE 100 MG: 50 TABLET, EXTENDED RELEASE ORAL at 09:34

## 2020-01-01 RX ADMIN — FUROSEMIDE 60 MG: 40 TABLET ORAL at 09:34

## 2020-01-01 RX ADMIN — IPRATROPIUM BROMIDE AND ALBUTEROL SULFATE 3 ML: .5; 3 SOLUTION RESPIRATORY (INHALATION) at 15:15

## 2020-01-01 RX ADMIN — FUROSEMIDE 20 MG: 20 TABLET ORAL at 20:28

## 2020-01-01 RX ADMIN — MICONAZOLE NITRATE: 20 CREAM TOPICAL at 09:35

## 2020-01-01 RX ADMIN — SODIUM CHLORIDE, PRESERVATIVE FREE 10 ML: 5 INJECTION INTRAVENOUS at 20:42

## 2020-01-01 RX ADMIN — CARBAMAZEPINE 200 MG: 200 TABLET ORAL at 09:34

## 2020-01-01 RX ADMIN — PANTOPRAZOLE SODIUM 40 MG: 40 TABLET, DELAYED RELEASE ORAL at 06:19

## 2020-01-01 RX ADMIN — CARBAMAZEPINE 200 MG: 200 TABLET ORAL at 20:29

## 2020-01-01 RX ADMIN — ROSUVASTATIN CALCIUM 40 MG: 5 TABLET, FILM COATED ORAL at 20:28

## 2020-01-01 RX ADMIN — MICONAZOLE NITRATE: 20 CREAM TOPICAL at 20:42

## 2020-01-01 RX ADMIN — IPRATROPIUM BROMIDE AND ALBUTEROL SULFATE 3 ML: .5; 3 SOLUTION RESPIRATORY (INHALATION) at 07:58

## 2020-01-01 NOTE — PLAN OF CARE
Problem: Patient Care Overview  Goal: Plan of Care Review  Outcome: Ongoing (interventions implemented as appropriate)  Flowsheets (Taken 12/31/2019 2011)  Progress: improving  Plan of Care Reviewed With: patient     Problem: Patient Care Overview  Goal: Individualization and Mutuality  Outcome: Ongoing (interventions implemented as appropriate)     Problem: Chronic Obstructive Pulmonary Disease (Adult)  Intervention: Optimize Oxygenation/Ventilation/Perfusion  Flowsheets (Taken 12/31/2019 2011)  Head of Bed (HOB): HOB elevated  Airway/Ventilation Management: airway patency maintained; pulmonary hygiene promoted  Breathing Techniques/Airway Clearance: deep/controlled cough encouraged

## 2020-01-01 NOTE — PROGRESS NOTES
No further pulmonary recommendations at this time  Ok to d/c on oxygen plan follow up with pulmonary in 4-6 weeks post discharge.  PLan full pft's at that.  Repeat high kwaku ct timing to be determined at that point and need for RHC to be determined then.

## 2020-01-01 NOTE — PROGRESS NOTES
"Hospitalist Team      Patient Care Team:  Becca Unger MD as PCP - General  Becca Unger MD as PCP - Family Medicine        Chief Complaint: Follow-up right femoral condyle fracture, urinary tract infection, and hyponatremia.    Subjective    Interval History and ROS:     The patient states she feels okay today.  He notes that her pain is controlled currently with oral pain medication.  He denies any chest pain, shortness of breath, or heart palpitations.  He states that she is eating and drinking normally.  She denies any nausea or vomiting.  She is on her home 2 L of oxygen.  Her blood pressure and heart rate are within normal limits.      Objective    Vital Signs  Temp:  [97.1 °F (36.2 °C)-97.8 °F (36.6 °C)] 97.8 °F (36.6 °C)  Heart Rate:  [62-98] 85  Resp:  [18-24] 20  BP: (102-110)/(70-75) 110/70  Oxygen Therapy  SpO2: 97 %  Pulse Oximetry Type: Continuous  Device (Oxygen Therapy): nasal cannula  Flow (L/min): 2      Flowsheet Rows      First Filed Value   Admission Height  165.1 cm (65\") Documented at 12/26/2019 0932   Admission Weight  75.5 kg (166 lb 8 oz) Documented at 12/26/2019 0932            Physical Exam:    Physical Exam   Constitutional: She is oriented to person, place, and time. She appears well-developed and well-nourished. No distress.   Appears older than her stated age.   HENT:   Head: Normocephalic and atraumatic.   Eyes: Conjunctivae and EOM are normal. No scleral icterus.   Neck: Neck supple. No JVD present.   Cardiovascular: Normal rate and normal heart sounds. Exam reveals no gallop and no friction rub.   No murmur heard.  Irregularly irregular rhythm   Pulmonary/Chest: Effort normal. No respiratory distress. She has no wheezes. She has no rales.   Diminished breath sounds throughout.   Abdominal: Soft. Bowel sounds are normal. There is no tenderness.   Musculoskeletal: She exhibits no edema.   Immobilizer brace present to the right lower extremity.   Neurological: She is alert " and oriented to person, place, and time.   Psychiatric:   Flat affect.   Vitals reviewed.      Results Review:     I reviewed the patient's new clinical results.    Lab Results (last 24 hours)     Procedure Component Value Units Date/Time    Basic Metabolic Panel [[id number]]  (Abnormal) Collected:  01/01/20 0330    Specimen:  Blood Updated:  01/01/20 0522     Glucose 89 mg/dL      BUN 22 mg/dL      Creatinine 1.17 mg/dL      Sodium 126 mmol/L      Potassium 3.6 mmol/L      Chloride 87 mmol/L      CO2 23.6 mmol/L      Calcium 8.8 mg/dL      eGFR Non African Amer 47 mL/min/1.73      BUN/Creatinine Ratio 18.8     Anion Gap 15.4 mmol/L     Narrative:       GFR Normal >60  Chronic Kidney Disease <60  Kidney Failure <15      Cyclic Citrul Peptide Antibody, IgG / IgA [[id number]] Collected:  12/30/19 0456    Specimen:  Blood Updated:  12/31/19 2206     CCP Antibodies IgG/IgA 10 units      Comment:                           Negative               <20                            Weak positive      20 - 39                            Moderate positive  40 - 59                            Strong positive        >59       Narrative:       Performed at:  [address] [place] [address]  [id number]  : Nano Mayo MD, Phone:  [phone]          Imaging Results (Last 24 Hours)     ** No results found for the last 24 hours. **        ECG/EMG Results (most recent)     Procedure Component Value Units Date/Time    ECG 12 Lead [[id number]] Collected:  12/26/19 0957     Updated:  12/26/19 1248    Narrative:       HEART RATE= 88  bpm  RR Interval= 680  ms  UT Interval=   ms  P Horizontal Axis=   deg  P Front Axis=   deg  QRSD Interval= 88  ms  QT Interval= 386  ms  QRS Axis= -1  deg  T Wave Axis= 61  deg  - ABNORMAL ECG -  Atrial fibrillation  Borderline T wave abnormalities  No change from prior tracing  Electronically Signed By: [facility] 26-Dec-2019 12:48:40  Date and Time of Study:  2019-12-26 09:57:41          Medication Review:   I have reviewed the patient's current medication list    Current Facility-Administered Medications:   •  acetaminophen (TYLENOL) tablet 650 mg, 650 mg, Oral, Q4H PRN **OR** acetaminophen (TYLENOL) 160 MG/5ML solution 650 mg, 650 mg, Oral, Q4H PRN **OR** acetaminophen (TYLENOL) suppository 650 mg, 650 mg, Rectal, Q4H PRN, Ester Delgado MD  •  calcium carbonate (TUMS) chewable tablet 500 mg (200 mg elemental), 2 tablet, Oral, TID PRN, Anival Crawford MD, 2 tablet at 12/30/19 2029  •  carBAMazepine (TEGretol) tablet 200 mg, 200 mg, Oral, BID, Ester Delgado MD, 200 mg at 01/01/20 0934  •  cefTRIAXone (ROCEPHIN) IVPB 1 g/50ml dextrose (premix), 1 g, Intravenous, Q24H, Radha Payne MD, Last Rate: 100 mL/hr at 12/31/19 2104, 1 g at 12/31/19 2104  •  furosemide (LASIX) tablet 20 mg, 20 mg, Oral, Nightly, Anival Crawford MD, 20 mg at 12/31/19 2109  •  furosemide (LASIX) tablet 60 mg, 60 mg, Oral, Daily, Anival Crawford MD, 60 mg at 01/01/20 0934  •  HYDROcodone-acetaminophen (NORCO) 5-325 MG per tablet 1 tablet, 1 tablet, Oral, Q4H PRN, Ester Delgado MD, 1 tablet at 12/31/19 2117  •  ipratropium-albuterol (DUO-NEB) nebulizer solution 3 mL, 3 mL, Nebulization, 4x Daily - RT, Ester Delgado MD, 3 mL at 01/01/20 1111  •  melatonin tablet 5 mg, 5 mg, Oral, Nightly PRN, Ester Delgado MD, 5 mg at 12/30/19 2029  •  metoprolol succinate XL (TOPROL-XL) 24 hr tablet 100 mg, 100 mg, Oral, Daily, Ester Delgado MD, 100 mg at 01/01/20 0934  •  miconazole (MICOTIN) 2 % cream, , Topical, Q12H, Anival Crawford MD  •  miconazole (MICOTIN) 2 % powder, , Topical, Q12H, Radha Payne MD  •  nitroglycerin (NITROSTAT) SL tablet 0.4 mg, 0.4 mg, Sublingual, Q5 Min PRN, Ester Delgado MD  •  pantoprazole (PROTONIX) EC tablet 40 mg, 40 mg, Oral, QAM, Ester Delgado  MD Catrina, 40 mg at 01/01/20 0619  •  primidone (MYSOLINE) tablet 250 mg, 250 mg, Oral, Daily, Ester Delgado MD, 250 mg at 01/01/20 0934  •  rosuvastatin (CRESTOR) tablet 40 mg, 40 mg, Oral, Nightly, Ester Delgado MD, 40 mg at 12/31/19 2109  •  [COMPLETED] Insert peripheral IV, , , Once **AND** sodium chloride 0.9 % flush 10 mL, 10 mL, Intravenous, PRN, Ester Delgado MD  •  sodium chloride 0.9 % flush 10 mL, 10 mL, Intravenous, PRN, Ester Delgado MD  •  sodium chloride 0.9 % flush 10 mL, 10 mL, Intravenous, Q12H, Ester Delgado MD, 10 mL at 01/01/20 0936  •  sodium chloride 0.9 % infusion 40 mL, 40 mL, Intravenous, PRN, Ester Delgado MD      Assessment/Plan     -Right Femoral Condyle Fracture with Large Effusion/Hemarthrosis:   Currently pain controlled on oral Bennett.  Dr. Reynoso with orthopedic surgery saw the patient here and currently this is being treated conservatively with a long leg hinged knee brace for stabilization and nonweightbearing status.  Patient will need to be seen back in Dr. Reynoso's office in 1 to 2 weeks.  Awaiting approval for TCU.    -E coli UTI: Day #6 of Rocephin.  Will continue for full 7-day course.     -Hyponatremia:   Appears chronic in this patient with low sodium levels dating back to 2015 and beyond.  The patient's serum osmolality is very slightly low.  She appears euvolemic on examination.  I have ordered a urine osmolality and urine sodium level for further evaluation.  Sodium level is stable from yesterday at 126.  I will start the patient on a fluid restriction of 1200 mL/day.  Recheck a BMP in the a.m.  If no improvement or worsening could consider nephrology consultation for assistance.    -Chronic A-fib:   Heart rate is at goal.  Patient is on her home dose of Toprol-XL.  She follows with Dr. Musa as an outpatient, not a candidate for anticoagulation.  No acute issues  here.     -Chronic dCHF:  No acute issues here.  On home dose of Lasix.  Appears euvolemic on examination.     -Chronic Hypoxic Resp Failure:   -Probable COPD:  -Pulmonary HTN:   -Abnormal chest CT:  Pulmonary follow the patient here.  Echo done earlier this month shows severe pulmonary hypertension and pulmonary notes the patient will likely need a right heart cath at some point.  Chest CT concerning for ILD serologies pending.  Patient needs to follow-up in the pulmonary office in 4 to 6 weeks status post discharge.  She will require outpatient PFTs and eventually a repeat CT scan, timing will be determined on an outpatient basis.  Continue current oxygen at 2 L nasal cannula.  Continue duo nebs 4 times daily.     -CKD stage III:   Patient's renal function is at her baseline.     -CAD with history of MI and prior stent placement:  -History of CVA:  No acute issues here currently.  Patient continued on her home dose of Brilinta and Crestor.    -Chronic normocytic anemia:  Baseline hemoglobin appears 9-10 as far back as March 2018.  Has no signs or symptoms of overt bleeding other than the hemarthrosis noted related to her fracture.  Hemoglobin here has been fairly stable in her normal range.       Plan for disposition: Awaiting insurance precertification for our skilled care and rehab unit.    Ester Delgado MD  01/01/20  2:17 PM

## 2020-01-01 NOTE — PLAN OF CARE
Pt required max encouragement to complete PT and additional verbal cues for sequencing of steps for mobility and transfers. Pt completes supine>sit Min A x 2, assist required for R.LE and to sit at EOB. Sit >stand transfer Min A x 2 with pt maintaining WBAT status with hinge brace locked. Pt transferred from bed to chair taking 3 steps forward and laterally, CGA/Min A x 2 with pt completing stand>sit Min A x 2. Increased cues for proper use of FWW, to complete full turn and proper hand placement. Reinforcement of transfers and mobility to ensure safety required. HEP x 10 reps completed in sitting with Min A for hip ABD/ADD.

## 2020-01-01 NOTE — THERAPY TREATMENT NOTE
Acute Care - Physical Therapy Treatment Note  WHITNEY Clayton     Patient Name: Destiny Catherine  : 1955  MRN: [id number]  Today's Date: 2020             Admit Date: 2019    Visit Dx:    ICD-10-CM ICD-9-CM   1. Urinary tract infection without hematuria, site unspecified N39.0 599.0   2. Confusion R41.0 298.9   3. Weakness R53.1 780.79   4. Closed nondisplaced fracture of condyle of right femur, initial encounter (CMS/[facility]) S72.414A 821.21   5. Chronic obstructive pulmonary disease, unspecified COPD type (CMS/[facility]) J44.9 496     Patient Active Problem List   Diagnosis   • Atrial fibrillation (CMS/[facility])   • Chronic diastolic heart failure (CMS/[facility])   • Hypertension   • Pulmonary hypertension (CMS/[facility])   • Nonruptured cerebral aneurysm   • Coronary artery disease involving native coronary artery of native heart without angina pectoris   • History of renal stent   • Tobacco abuse   • Epilepsy (CMS/[facility])   • History of colonic polyps   • History of abnormal cervical Pap smear   • HPV in female   • Acute on chronic respiratory failure with hypoxia (CMS/[facility])   • Chronic right-sided heart failure (CMS/[facility])   • Urinary tract infection without hematuria       Therapy Treatment    Rehabilitation Treatment Summary     Row Name 20 1125             Treatment Time/Intention    Discipline  physical therapy assistant  -KM      Document Type  therapy note (daily note)  -KM      Subjective Information  complains of;fatigue  -KM      Mode of Treatment  physical therapy  -KM      Patient/Family Observations  Pt in bed, needs encouragment to complete tasks.   -KM      Patient Effort  fair  -KM      Comment  Pt required max encouragement to complete transfer from bed > chair and to complete HEP.   -KM      Existing Precautions/Restrictions  weight bearing;brace worn when out of bed  -KM      Patient Response to Treatment  Pt required additional cues to complete sequence of steps and to ensure safety with transfers.   -KM       Recorded by [KM] Devika Porras, PTA 01/01/20 1148      Row Name 01/01/20 1125             Bed Mobility Assessment/Treatment    Bed Mobility Assessment/Treatment  supine-sit  -KM      Supine-Sit [place] (Bed Mobility)  minimum assist (75% patient effort);conditional independence;1 person assist;verbal cues  -KM      Assistive Device (Bed Mobility)  bed rails;head of bed elevated  -KM      Comment (Bed Mobility)  Pt requires MIn A with R.LE and to pull self to EOB.   -KM      Recorded by [KM] Devika Porras PTA 01/01/20 1148      Row Name 01/01/20 1125             Transfer Assessment/Treatment    Transfer Assessment/Treatment  sit-stand transfer;stand-sit transfer  -KM      Comment (Transfers)  Increased verbal cues to complete transfers properly to ensure safety. Increased assistant for stand>sit with pt not complete full turn in front of chair and using proper hand placement.   -KM      Recorded by [KM] Devika Porras PTA 01/01/20 1148      Row Name 01/01/20 1125             Sit-Stand Transfer    Sit-Stand [place] (Transfers)  minimum assist (75% patient effort);2 person assist  -KM      Assistive Device (Sit-Stand Transfers)  walker, front-wheeled  -KM      Recorded by [KM] Devika Porras PTA 01/01/20 1148      Row Name 01/01/20 1125             Stand-Sit Transfer    Stand-Sit [place] (Transfers)  minimum assist (75% patient effort);2 person assist  -KM      Assistive Device (Stand-Sit Transfers)  walker, front-wheeled  -KM      Recorded by [KM] Devika Porras PTA 01/01/20 1148      Row Name 01/01/20 1125             Gait/Stairs Assessment/Training    Comment (Gait/Stairs)  Bed to chair transfer completed with 3 steps forward and lateral stepping. CGA/Min A x 2 with FWW.  -KM      Recorded by [KM] Devika Porras PTA 01/01/20 1148      Row Name 01/01/20 1125             Therapeutic Exercise    Lower Extremity (Therapeutic Exercise)  gluteal sets;quad sets, right  -KM      Lower Extremity Range  of Motion (Therapeutic Exercise)  hip abduction/adduction, right;ankle dorsiflexion/plantar flexion, bilateral  -KM      Comment (Therapeutic Exercise)  HEP x 10 reps completed. Min A with hip Abd/Add  -KM      Recorded by [LEAH] Devika Porras PTA 01/01/20 1148      Row Name 01/01/20 1125             Positioning and Restraints    Pre-Treatment Position  in bed  -KM      Post Treatment Position  chair  -KM      In Chair  reclined;call light within reach;encouraged to call for assist  -KM      Recorded by [LEAH] Devika Porras PTA 01/01/20 1148      Row Name 01/01/20 1125             Plan of Care Review    Plan of Care Reviewed With  patient  -KM      Progress  no change  -KM      Outcome Summary  Pt required max encouragement to complete PT and additional verbal cues for sequencing of steps for mobility and transfers. Pt completes supine>sit Min A x 2, assist required for R.LE and to sit at EOB. Sit >stand transfer Min A x 2 with pt maintaining WBAT status with hinge brace locked. Pt transferred from bed to chair taking 3 steps forward and laterally, CGA/Min A x 2 with pt completing stand>sit Min A x 2. Increased cues for proper use of FWW, to complete full turn and proper hand placement. Reinforcement of transfers and mobility to ensure safety required. HEP x 10 reps completed in sitting with Min A for hip ABD/ADD.   -KM      Recorded by [LEAH] Devika Porras PTA 01/01/20 1159        User Key  (r) = Recorded By, (t) = Taken By, (c) = Cosigned By    Initials Name Effective Dates Discipline    KM Devika Porras PTA 02/05/19 -  PT          Rash 12/26/19 1639 Left lower breast (Active)   Characteristics moist 1/1/2020  4:15 AM   Color red 1/1/2020  4:15 AM   Care, Rash open to air;other (see comments) 12/31/2019  8:45 PM       Rash 12/26/19 1639 Right lower breast (Active)   Characteristics moist 1/1/2020  4:15 AM   Color red 1/1/2020  4:15 AM   Care, Rash open to air;other (see comments) 12/31/2019  8:45 PM                PT Recommendation and Plan     Plan of Care Reviewed With: patient  Progress: no change  Outcome Summary: Pt required max encouragement to complete PT and additional verbal cues for sequencing of steps for mobility and transfers. Pt completes supine>sit Min A x 2, assist required for R.LE and to sit at EOB. Sit >stand transfer Min A x 2 with pt maintaining WBAT status with hinge brace locked. Pt transferred from bed to chair taking 3 steps forward and laterally, CGA/Min A x 2 with pt completing stand>sit Min A x 2. Increased cues for proper use of FWW, to complete full turn and proper hand placement. Reinforcement of transfers and mobility to ensure safety required. HEP x 10 reps completed in sitting with Min A for hip ABD/ADD.   Outcome Measures     Row Name 01/01/20 1125 12/31/19 1000          How much help from another person do you currently need...    Turning from your back to your side while in flat bed without using bedrails?  3  -KM  3  -BP     Moving from lying on back to sitting on the side of a flat bed without bedrails?  3  -KM  3  -BP     Moving to and from a bed to a chair (including a wheelchair)?  3  -KM  3  -BP     Standing up from a chair using your arms (e.g., wheelchair, bedside chair)?  3  -KM  3  -BP     Climbing 3-5 steps with a railing?  2  -KM  2  -BP     To walk in hospital room?  3  -KM  3  -BP     AM-PAC 6 Clicks Score (PT)  17  -KM  17  -BP       User Key  (r) = Recorded By, (t) = Taken By, (c) = Cosigned By    Initials Name Provider Type    Nicolette Hampton, PT Physical Therapist    Devika Yee PTA Physical Therapy Assistant         Time Calculation:   PT Charges     Row Name 01/01/20 1201             Time Calculation    Start Time  1125  -KM      Stop Time  1140  -KM      Time Calculation (min)  15 min  -KM        User Key  (r) = Recorded By, (t) = Taken By, (c) = Cosigned By    Initials Name Provider Type    Devika Yee PTA Physical Therapy Assistant         Therapy Charges for Today     Code Description Service Date Service Provider Modifiers Qty    37591835736 HC GAIT TRAINING EA 15 MIN 1/1/2020 Devika Porras, PTA GP 1          PT G-Codes  Outcome Measure Options: AM-PAC 6 Clicks Basic Mobility (PT)  AM-PAC 6 Clicks Score (PT): 17    Devika Porras PTA  1/1/2020

## 2020-01-02 LAB
ANION GAP SERPL CALCULATED.3IONS-SCNC: 14.6 MMOL/L (ref 5–15)
BUN BLD-MCNC: 24 MG/DL (ref 8–23)
BUN/CREAT SERPL: 23.8 (ref 7–25)
C-ANCA TITR SER IF: ABNORMAL TITER
CALCIUM SPEC-SCNC: 8.1 MG/DL (ref 8.6–10.5)
CHLORIDE SERPL-SCNC: 90 MMOL/L (ref 98–107)
CO2 SERPL-SCNC: 22.4 MMOL/L (ref 22–29)
CREAT BLD-MCNC: 1.01 MG/DL (ref 0.57–1)
GFR SERPL CREATININE-BSD FRML MDRD: 55 ML/MIN/1.73
GLUCOSE BLD-MCNC: 98 MG/DL (ref 65–99)
MYELOPEROXIDASE AB SER-ACNC: 14.3 U/ML (ref 0–9)
OSMOLALITY UR: 325 MOSM/KG
P-ANCA ATYPICAL TITR SER IF: ABNORMAL TITER
P-ANCA TITR SER IF: ABNORMAL TITER
POTASSIUM BLD-SCNC: 3.5 MMOL/L (ref 3.5–5.2)
PROCALCITONIN SERPL-MCNC: 0.19 NG/ML (ref 0.1–0.25)
PROTEINASE3 AB SER IA-ACNC: 9.2 U/ML (ref 0–3.5)
SODIUM BLD-SCNC: 127 MMOL/L (ref 136–145)
SODIUM UR-SCNC: <20 MMOL/L

## 2020-01-02 PROCEDURE — 94799 UNLISTED PULMONARY SVC/PX: CPT

## 2020-01-02 PROCEDURE — 84300 ASSAY OF URINE SODIUM: CPT | Performed by: HOSPITALIST

## 2020-01-02 PROCEDURE — 80048 BASIC METABOLIC PNL TOTAL CA: CPT | Performed by: HOSPITALIST

## 2020-01-02 PROCEDURE — 25010000002 CEFTRIAXONE SODIUM-DEXTROSE 1-3.74 GM-%(50ML) RECONSTITUTED SOLUTION: Performed by: INTERNAL MEDICINE

## 2020-01-02 PROCEDURE — 97110 THERAPEUTIC EXERCISES: CPT

## 2020-01-02 PROCEDURE — 99231 SBSQ HOSP IP/OBS SF/LOW 25: CPT | Performed by: HOSPITALIST

## 2020-01-02 PROCEDURE — 83935 ASSAY OF URINE OSMOLALITY: CPT | Performed by: HOSPITALIST

## 2020-01-02 PROCEDURE — 84145 PROCALCITONIN (PCT): CPT | Performed by: HOSPITALIST

## 2020-01-02 RX ADMIN — SODIUM CHLORIDE, PRESERVATIVE FREE 10 ML: 5 INJECTION INTRAVENOUS at 21:30

## 2020-01-02 RX ADMIN — CARBAMAZEPINE 200 MG: 200 TABLET ORAL at 22:59

## 2020-01-02 RX ADMIN — SODIUM CHLORIDE, PRESERVATIVE FREE 10 ML: 5 INJECTION INTRAVENOUS at 08:44

## 2020-01-02 RX ADMIN — IPRATROPIUM BROMIDE AND ALBUTEROL SULFATE 3 ML: .5; 3 SOLUTION RESPIRATORY (INHALATION) at 15:47

## 2020-01-02 RX ADMIN — METOPROLOL SUCCINATE 100 MG: 50 TABLET, EXTENDED RELEASE ORAL at 08:46

## 2020-01-02 RX ADMIN — IPRATROPIUM BROMIDE AND ALBUTEROL SULFATE 3 ML: .5; 3 SOLUTION RESPIRATORY (INHALATION) at 07:19

## 2020-01-02 RX ADMIN — CEFTRIAXONE 1 G: 1 INJECTION, SOLUTION INTRAVENOUS at 22:57

## 2020-01-02 RX ADMIN — HYDROCODONE BITARTRATE AND ACETAMINOPHEN 1 TABLET: 5; 325 TABLET ORAL at 02:10

## 2020-01-02 RX ADMIN — MICONAZOLE NITRATE: 20 CREAM TOPICAL at 20:40

## 2020-01-02 RX ADMIN — PRIMIDONE 250 MG: 250 TABLET ORAL at 08:46

## 2020-01-02 RX ADMIN — HYDROCODONE BITARTRATE AND ACETAMINOPHEN 1 TABLET: 5; 325 TABLET ORAL at 22:57

## 2020-01-02 RX ADMIN — MICONAZOLE NITRATE: 20 CREAM TOPICAL at 08:45

## 2020-01-02 RX ADMIN — FUROSEMIDE 20 MG: 20 TABLET ORAL at 22:58

## 2020-01-02 RX ADMIN — ROSUVASTATIN CALCIUM 40 MG: 5 TABLET, FILM COATED ORAL at 22:59

## 2020-01-02 RX ADMIN — IPRATROPIUM BROMIDE AND ALBUTEROL SULFATE 3 ML: .5; 3 SOLUTION RESPIRATORY (INHALATION) at 20:25

## 2020-01-02 RX ADMIN — CARBAMAZEPINE 200 MG: 200 TABLET ORAL at 08:46

## 2020-01-02 RX ADMIN — FUROSEMIDE 60 MG: 40 TABLET ORAL at 08:46

## 2020-01-02 RX ADMIN — MELATONIN TAB 5 MG 5 MG: 5 TAB at 22:57

## 2020-01-02 RX ADMIN — IPRATROPIUM BROMIDE AND ALBUTEROL SULFATE 3 ML: .5; 3 SOLUTION RESPIRATORY (INHALATION) at 12:07

## 2020-01-02 RX ADMIN — PANTOPRAZOLE SODIUM 40 MG: 40 TABLET, DELAYED RELEASE ORAL at 06:15

## 2020-01-02 NOTE — THERAPY TREATMENT NOTE
Acute Care - Physical Therapy Treatment Note  WHITNEY Clayton     Patient Name: Destiny Catherine  : 1955  MRN: [id number]  Today's Date: 2020             Admit Date: 2019    Visit Dx:    ICD-10-CM ICD-9-CM   1. Urinary tract infection without hematuria, site unspecified N39.0 599.0   2. Confusion R41.0 298.9   3. Weakness R53.1 780.79   4. Closed nondisplaced fracture of condyle of right femur, initial encounter (CMS/[facility]) S72.414A 821.21   5. Chronic obstructive pulmonary disease, unspecified COPD type (CMS/[facility]) J44.9 496     Patient Active Problem List   Diagnosis   • Atrial fibrillation (CMS/[facility])   • Chronic diastolic heart failure (CMS/[facility])   • Hypertension   • Pulmonary hypertension (CMS/[facility])   • Nonruptured cerebral aneurysm   • Coronary artery disease involving native coronary artery of native heart without angina pectoris   • History of renal stent   • Tobacco abuse   • Epilepsy (CMS/[facility])   • History of colonic polyps   • History of abnormal cervical Pap smear   • HPV in female   • Acute on chronic respiratory failure with hypoxia (CMS/[facility])   • Chronic right-sided heart failure (CMS/[facility])   • Urinary tract infection without hematuria       Therapy Treatment    Rehabilitation Treatment Summary     Row Name 20 0851             Treatment Time/Intention    Discipline  physical therapist  -      Document Type  therapy note (daily note)  -      Subjective Information  complains of;fatigue  -      Mode of Treatment  physical therapy  -      Patient/Family Observations  pt supine, agreeable to therapy with encouragement  -      Care Plan Review  care plan/treatment goals reviewed;risks/benefits reviewed;patient/other agree to care plan  -      Patient Effort  fair  -      Comment  pt requires encouragement for minimal participation  -      Existing Precautions/Restrictions  fall;oxygen therapy device and L/min  -      Treatment Considerations/Comments  pt requires cues for safety with  sequencing and safety throughout session  -JW      Recorded by [JW] Adrianna Anton, PT 01/02/20 0924      Row Name 01/02/20 0851             Cognitive Assessment/Intervention- PT/OT    Personal Safety Interventions  gait belt;nonskid shoes/slippers when out of bed  -JW      Recorded by [JW] Adrianna Anton, PT 01/02/20 0924      Row Name 01/02/20 0851             Safety Issues, Functional Mobility    Safety Issues Affecting Function (Mobility)  insight into deficits/self awareness;judgment;positioning of assistive device;problem solving;safety precaution awareness;sequencing abilities  -JW      Recorded by [JW] Adrianna Anton, PT 01/02/20 0924      Row Name 01/02/20 0851             Bed Mobility Assessment/Treatment    Supine-Sit [place] (Bed Mobility)  moderate assist (50% patient effort);verbal cues  -JW      Assistive Device (Bed Mobility)  bed rails;head of bed elevated  -JW      Comment (Bed Mobility)  pt requires encouragement for active participation, requires cues for sequencing with transfer to EOB  -JW      Recorded by [JW] Adrianna Anton, PT 01/02/20 0924      Row Name 01/02/20 0851             Transfer Assessment/Treatment    Comment (Transfers)  verbal cues for sequencing and safety throughout transfer  -JW      Recorded by [JW] Adrianna Anton, PT 01/02/20 0924      Row Name 01/02/20 0851             Sit-Stand Transfer    Sit-Stand [place] (Transfers)  moderate assist (50% patient effort);verbal cues  -JW      Assistive Device (Sit-Stand Transfers)  walker, front-wheeled  -JW      Recorded by [JW] Adrianna Anton, PT 01/02/20 0924      Row Name 01/02/20 0851             Stand-Sit Transfer    Stand-Sit [place] (Transfers)  moderate assist (50% patient effort);verbal cues  -JW      Assistive Device (Stand-Sit Transfers)  walker, front-wheeled  -JW      Recorded by [JW] Adrianna Anton, PT 01/02/20 0924      Row Name 01/02/20 0851             Gait/Stairs Assessment/Training    Comment  (Gait/Stairs)  Patient performs gait with rolling walker x4 feet with verbal cues for sequencing and safety with walker.  Patient requires cues for safety with direction changes and transition to sitting.  Patient demonstrates poor safety awareness with mobility.  pt refuses further gait training  -JW      Recorded by [JW] Adrianna Anton, PT 01/02/20 0924      Row Name 01/02/20 0851             Positioning and Restraints    Pre-Treatment Position  in bed  -JW      Post Treatment Position  chair  -JW      In Chair  reclined;call light within reach;encouraged to call for assist  -JW      Recorded by [JW] Adrianna Anton, PT 01/02/20 0924      Row Name 01/02/20 0851             Pain Scale: Numbers Pre/Post-Treatment    Pain Scale: Numbers, Pretreatment  0/10 - no pain  -JW      Pain Scale: Numbers, Post-Treatment  0/10 - no pain  -JW      Pre/Post Treatment Pain Comment  no c/o pain  -JW      Recorded by [JW] Adrianna Anton, PT 01/02/20 0924      Row Name 01/02/20 0851             Plan of Care Review    Plan of Care Reviewed With  patient  -JW      Recorded by [JW] Adrianna Anton, PT 01/02/20 0924      Row Name 01/02/20 0851             Outcome Summary/Treatment Plan (PT)    Daily Summary of Progress (PT)  unable to show any progress toward functional goals  -JW      Barriers to Overall Progress (PT)  participation  -JW      Recorded by [JW] Adrianna Anton, PT 01/02/20 0924        User Key  (r) = Recorded By, (t) = Taken By, (c) = Cosigned By    Initials Name Effective Dates Discipline    JW Adrianna Anton, PT 04/03/18 -  PT          Rash 12/26/19 1639 Left lower breast (Active)   Characteristics moist 1/2/2020  4:20 AM   Color red 1/2/2020  4:20 AM   Care, Rash open to air;other (see comments) 1/1/2020  8:15 PM       Rash 12/26/19 1639 Right lower breast (Active)   Characteristics moist 1/2/2020  4:20 AM   Color red 1/2/2020  4:20 AM   Care, Rash open to air;other (see comments) 1/1/2020  8:15 PM               PT  "Recommendation and Plan     Outcome Summary/Treatment Plan (PT)  Daily Summary of Progress (PT): unable to show any progress toward functional goals  Barriers to Overall Progress (PT): participation  Plan of Care Reviewed With: patient  Outcome Summary: PT: Patient requires encouragement for participation with therapy this AM.  Patient states \"I already got up yesterday\".  Significant encouragement required for minimal participation.  Patient performs supine to sit transfer with mod assist and sit to stand with mod assist and cues for hand placement.  Patient performs gait x4 steps from bed to chair with assistance and cues for sequencing and safety.  Patient refuses further gait distance stating \"I am done for the day\".  Due to patient's low level mobility at baseline and decresaed willingness to participate with progressive activity, unsure of progress from skilled rehab unless patient's willingness to progress with therapy improves.  Outcome Measures     Row Name 01/02/20 0851 01/01/20 1125 12/31/19 1000       How much help from another person do you currently need...    Turning from your back to your side while in flat bed without using bedrails?  3  -  3  -KM  3  -BP    Moving from lying on back to sitting on the side of a flat bed without bedrails?  2  -  3  -KM  3  -BP    Moving to and from a bed to a chair (including a wheelchair)?  2  -  3  -KM  3  -BP    Standing up from a chair using your arms (e.g., wheelchair, bedside chair)?  3  -  3  -KM  3  -BP    Climbing 3-5 steps with a railing?  2  -  2  -KM  2  -BP    To walk in hospital room?  3  -  3  -KM  3  -BP    AM-PAC 6 Clicks Score (PT)  15  -  17  -KM  17  -BP       Functional Assessment    Outcome Measure Options  AM-PAC 6 Clicks Basic Mobility (PT)  -  --  --      User Key  (r) = Recorded By, (t) = Taken By, (c) = Cosigned By    Initials Name Provider Type    Nicolette Hampton, PT Physical Therapist    Adrianna Khalil, PT " Physical Therapist    KM Devika Porras, PTA Physical Therapy Assistant         Time Calculation:   PT Charges     Row Name 01/02/20 1005             Time Calculation    Start Time  0851  -      Stop Time  0902  -      Time Calculation (min)  11 min  -      PT Received On  01/02/20  -JUAN DANIEL      PT - Next Appointment  01/03/20  -JUAN DANIEL         Timed Charges    31684 - PT Therapeutic Exercise Minutes  11  -        User Key  (r) = Recorded By, (t) = Taken By, (c) = Cosigned By    Initials Name Provider Type    Adrianna Khalil, PT Physical Therapist        Therapy Charges for Today     Code Description Service Date Service Provider Modifiers Qty    88866427359 HC PT THER PROC EA 15 MIN 1/2/2020 Adrianna Anton, PT GP 1          PT G-Codes  Outcome Measure Options: AM-PAC 6 Clicks Basic Mobility (PT)  AM-PAC 6 Clicks Score (PT): 15    Adrianna Anton PT  1/2/2020

## 2020-01-02 NOTE — PLAN OF CARE
"  Problem: Patient Care Overview  Goal: Plan of Care Review  Flowsheets (Taken 1/2/2020 8185)  Outcome Summary: PT: Patient requires encouragement for participation with therapy this AM.  Patient states \"I already got up yesterday\".  Significant encouragement required for minimal participation.  Patient performs supine to sit transfer with mod assist and sit to stand with mod assist and cues for hand placement.  Patient performs gait x4 steps from bed to chair with assistance and cues for sequencing and safety.  Patient refuses further gait distance stating \"I am done for the day\".  Due to patient's low level mobility at baseline and decresaed willingness to participate with progressive activity, unsure of progress from skilled rehab unless patient's willingness to progress with therapy improves.     "

## 2020-01-02 NOTE — NURSING NOTE
Continued Stay Note  WHITNEY Clayton     Patient Name: Destiyn Catherine  MRN: [id number]  Today's Date: 1/2/2020    Admit Date: 12/26/2019    Discharge Plan     Row Name 01/02/20 0851       Plan    Plan  referral [facility]- await precert    Plan Comments  [facility] r/t update on precert. Will continue to follow.        Discharge Codes    No documentation.             Kraig Escalante RN

## 2020-01-02 NOTE — PROGRESS NOTES
"Hospitalist Team      Patient Care Team:  Becca Unger MD as PCP - General  Becca Unger MD as PCP - Family Medicine        Chief Complaint:  Follow-up Right Femoral Condyle Fracture; Hyponatremia    Subjective    Feels about the same.  Did work a little w/ PT, but then wanted to go back to bed.  Denies chest pain and dyspnea.  Tolerating PO.  Pain well-controlled.    Objective    Vital Signs  Temp:  [96.3 °F (35.7 °C)-97.9 °F (36.6 °C)] 96.3 °F (35.7 °C)  Heart Rate:  [71-98] 83  Resp:  [20] 20  BP: ()/(67-81) 115/68  Oxygen Therapy  SpO2: 95 %  Pulse Oximetry Type: Intermittent  Device (Oxygen Therapy): nasal cannula  Flow (L/min): 2  Oxygen Concentration (%): (decreased to 2L)}    Flowsheet Rows      First Filed Value   Admission Height  165.1 cm (65\") Documented at 12/26/2019 0932   Admission Weight  75.5 kg (166 lb 8 oz) Documented at 12/26/2019 0932        Physical Exam:    General: Appears older than stated age.  Lungs: Clear throughout all fields.  Normal excursion.  CV: IRR.  Normal S1 and S2.  Radial pulses are 2+ and symmetric.  Abdomen: Soft and non-tender w/ active bowel sounds  MSK: No C/C.  Right knee effusion still present.  Brace in place.  No pedal edema.  Neuro: CN II-XII grossly intact  Psych: Flat affect.    Results Review:     I reviewed the patient's new clinical results.    Lab Results (last 24 hours)     Procedure Component Value Units Date/Time    Osmolality, Urine - Urine, Clean Catch [[id number]] Collected:  01/02/20 0007    Specimen:  Urine, Clean Catch Updated:  01/02/20 1133     Osmolality, Urine 325 mOsm/kg     Narrative:       Osmo Normal Reference Ranges:    Random:  mOsm/kg H2O, depending on fluid intake.  Random: >850 mOsm/kg H20, after 12 hour fluid restriction.    24 Hour: 300-900 mOsm/kg H2O.    Procalcitonin [[id number]]  (Normal) Collected:  01/02/20 0356    Specimen:  Blood Updated:  01/02/20 0549     Procalcitonin 0.19 ng/mL     Basic Metabolic Panel " [[id number]]  (Abnormal) Collected:  01/02/20 0356    Specimen:  Blood Updated:  01/02/20 0544     Glucose 98 mg/dL      BUN 24 mg/dL      Creatinine 1.01 mg/dL      Sodium 127 mmol/L      Potassium 3.5 mmol/L      Chloride 90 mmol/L      CO2 22.4 mmol/L      Calcium 8.1 mg/dL      eGFR Non African Amer 55 mL/min/1.73      BUN/Creatinine Ratio 23.8     Anion Gap 14.6 mmol/L     Narrative:       GFR Normal >60  Chronic Kidney Disease <60  Kidney Failure <15      Sodium, Urine, Random - Urine, Clean Catch [[id number]] Collected:  01/02/20 0007    Specimen:  Urine, Clean Catch Updated:  01/02/20 0031     Sodium, Urine <20 mmol/L     Narrative:       Reference intervals for random urine have not been established.  Clinical usage is dependent upon physician's interpretation in combination with other laboratory tests.             Imaging Results (Last 24 Hours)     ** No results found for the last 24 hours. **            Medication Review:   I have reviewed the patient's current medication list    Current Facility-Administered Medications:   •  acetaminophen (TYLENOL) tablet 650 mg, 650 mg, Oral, Q4H PRN **OR** acetaminophen (TYLENOL) 160 MG/5ML solution 650 mg, 650 mg, Oral, Q4H PRN **OR** acetaminophen (TYLENOL) suppository 650 mg, 650 mg, Rectal, Q4H PRN, Estre Delgado MD  •  calcium carbonate (TUMS) chewable tablet 500 mg (200 mg elemental), 2 tablet, Oral, TID PRN, Anival Crawford MD, 2 tablet at 12/30/19 2029  •  carBAMazepine (TEGretol) tablet 200 mg, 200 mg, Oral, BID, Ester Delgado MD, 200 mg at 01/02/20 0846  •  cefTRIAXone (ROCEPHIN) IVPB 1 g/50ml dextrose (premix), 1 g, Intravenous, Q24H, Radha Payne MD, Last Rate: 100 mL/hr at 01/01/20 2035, 1 g at 01/01/20 2035  •  furosemide (LASIX) tablet 20 mg, 20 mg, Oral, Nightly, Anival Crawford MD, 20 mg at 01/01/20 2028  •  furosemide (LASIX) tablet 60 mg, 60 mg, Oral, Daily, Anival Crawford MD, 60 mg at 01/02/20 0846  •   HYDROcodone-acetaminophen (NORCO) 5-325 MG per tablet 1 tablet, 1 tablet, Oral, Q4H PRN, Ester Delgado MD, 1 tablet at 01/02/20 0210  •  ipratropium-albuterol (DUO-NEB) nebulizer solution 3 mL, 3 mL, Nebulization, 4x Daily - RT, Ester Delgado MD, 3 mL at 01/02/20 1207  •  melatonin tablet 5 mg, 5 mg, Oral, Nightly PRN, Ester Delgado MD, 5 mg at 01/01/20 2029  •  metoprolol succinate XL (TOPROL-XL) 24 hr tablet 100 mg, 100 mg, Oral, Daily, Ester Delgado MD, 100 mg at 01/02/20 0846  •  miconazole (MICOTIN) 2 % cream, , Topical, Q12H, Anival Crawford MD  •  miconazole (MICOTIN) 2 % powder, , Topical, Q12H, Radha Payne MD  •  nitroglycerin (NITROSTAT) SL tablet 0.4 mg, 0.4 mg, Sublingual, Q5 Min PRN, Ester Delgado MD  •  pantoprazole (PROTONIX) EC tablet 40 mg, 40 mg, Oral, QAM, Ester Delgado MD, 40 mg at 01/02/20 0615  •  primidone (MYSOLINE) tablet 250 mg, 250 mg, Oral, Daily, Ester Delgado MD, 250 mg at 01/02/20 0846  •  rosuvastatin (CRESTOR) tablet 40 mg, 40 mg, Oral, Nightly, Ester Delgado MD, 40 mg at 01/01/20 2028  •  [COMPLETED] Insert peripheral IV, , , Once **AND** sodium chloride 0.9 % flush 10 mL, 10 mL, Intravenous, PRN, Ester Delgado MD  •  sodium chloride 0.9 % flush 10 mL, 10 mL, Intravenous, PRN, Ester Delgado MD  •  sodium chloride 0.9 % flush 10 mL, 10 mL, Intravenous, Q12H, Ester Delgado MD, 10 mL at 01/02/20 0844  •  sodium chloride 0.9 % infusion 40 mL, 40 mL, Intravenous, PRN, Ester Delgado MD      Assessment/Plan     1.  Right Femoral Condyle Fracture w/ Large Effusion/Hemarthrosis: Continue to wait for pre-cert.  Encouraged better participation w/ PT.     2.  E coli UTI: Has completed full course.  Rocephin stopped.     3.  Hyponatremia: Sodium trending up.  Stopped fluid restriction given Urine  sodium and Urine [place].  Will monitor sparsely as she is asymptomatic and her past trend has been similar.  Likely multifactorial etiology.     4.  Chronic Afib: No acute issues.  Rate at goal.     5.  Chronic dCHF/Pulmonary HTN: No acute issues.  Needs RHC in the future.     6.  COPD/Chronic Hypoxic Resp Failure: No acute issues.  ILD work-up in progress and so far negative.     7.  CKDIII: Creatinine remains about baseline.     8.  CAD: No chest pain.      Plan for disposition: Await pre-cert.    Anival Crawford MD  01/02/20  3:32 PM

## 2020-01-03 ENCOUNTER — HOSPITAL ENCOUNTER (INPATIENT)
Facility: HOSPITAL | Age: 65
LOS: 7 days | Discharge: HOME HEALTH | End: 2020-01-10
Attending: HOSPITALIST | Admitting: HOSPITALIST

## 2020-01-03 VITALS
RESPIRATION RATE: 18 BRPM | SYSTOLIC BLOOD PRESSURE: 115 MMHG | WEIGHT: 176.4 LBS | HEART RATE: 88 BPM | OXYGEN SATURATION: 91 % | HEIGHT: 67 IN | TEMPERATURE: 97.8 F | BODY MASS INDEX: 27.69 KG/M2 | DIASTOLIC BLOOD PRESSURE: 77 MMHG

## 2020-01-03 LAB
ANION GAP SERPL CALCULATED.3IONS-SCNC: 14.7 MMOL/L (ref 5–15)
BUN BLD-MCNC: 21 MG/DL (ref 8–23)
BUN/CREAT SERPL: 19.6 (ref 7–25)
CALCIUM SPEC-SCNC: 9 MG/DL (ref 8.6–10.5)
CHLORIDE SERPL-SCNC: 90 MMOL/L (ref 98–107)
CO2 SERPL-SCNC: 26.3 MMOL/L (ref 22–29)
CREAT BLD-MCNC: 1.07 MG/DL (ref 0.57–1)
GFR SERPL CREATININE-BSD FRML MDRD: 52 ML/MIN/1.73
GLUCOSE BLD-MCNC: 95 MG/DL (ref 65–99)
GLUCOSE BLDC GLUCOMTR-MCNC: 105 MG/DL (ref 70–130)
POTASSIUM BLD-SCNC: 4.3 MMOL/L (ref 3.5–5.2)
SODIUM BLD-SCNC: 131 MMOL/L (ref 136–145)

## 2020-01-03 PROCEDURE — 94799 UNLISTED PULMONARY SVC/PX: CPT

## 2020-01-03 PROCEDURE — 82962 GLUCOSE BLOOD TEST: CPT

## 2020-01-03 PROCEDURE — 80048 BASIC METABOLIC PNL TOTAL CA: CPT | Performed by: HOSPITALIST

## 2020-01-03 PROCEDURE — 97165 OT EVAL LOW COMPLEX 30 MIN: CPT

## 2020-01-03 PROCEDURE — 97110 THERAPEUTIC EXERCISES: CPT

## 2020-01-03 PROCEDURE — 99239 HOSP IP/OBS DSCHRG MGMT >30: CPT | Performed by: HOSPITALIST

## 2020-01-03 RX ORDER — ACETAMINOPHEN 650 MG/1
650 SUPPOSITORY RECTAL EVERY 4 HOURS PRN
Status: DISCONTINUED | OUTPATIENT
Start: 2020-01-03 | End: 2020-01-10 | Stop reason: HOSPADM

## 2020-01-03 RX ORDER — PRIMIDONE 250 MG/1
250 TABLET ORAL DAILY
Status: CANCELLED | OUTPATIENT
Start: 2020-01-04

## 2020-01-03 RX ORDER — CHOLECALCIFEROL (VITAMIN D3) 125 MCG
5 CAPSULE ORAL NIGHTLY PRN
Status: CANCELLED | OUTPATIENT
Start: 2020-01-03

## 2020-01-03 RX ORDER — ACETAMINOPHEN 160 MG/5ML
650 SOLUTION ORAL EVERY 4 HOURS PRN
Status: DISCONTINUED | OUTPATIENT
Start: 2020-01-03 | End: 2020-01-10 | Stop reason: HOSPADM

## 2020-01-03 RX ORDER — CALCIUM CARBONATE 200(500)MG
2 TABLET,CHEWABLE ORAL 3 TIMES DAILY PRN
Status: DISCONTINUED | OUTPATIENT
Start: 2020-01-03 | End: 2020-01-10 | Stop reason: HOSPADM

## 2020-01-03 RX ORDER — SODIUM CHLORIDE 0.9 % (FLUSH) 0.9 %
10 SYRINGE (ML) INJECTION AS NEEDED
Status: DISCONTINUED | OUTPATIENT
Start: 2020-01-03 | End: 2020-01-10 | Stop reason: HOSPADM

## 2020-01-03 RX ORDER — ACETAMINOPHEN 325 MG/1
650 TABLET ORAL EVERY 4 HOURS PRN
Status: CANCELLED | OUTPATIENT
Start: 2020-01-03

## 2020-01-03 RX ORDER — SODIUM CHLORIDE 0.9 % (FLUSH) 0.9 %
10 SYRINGE (ML) INJECTION AS NEEDED
Status: CANCELLED | OUTPATIENT
Start: 2020-01-03

## 2020-01-03 RX ORDER — HYDROCODONE BITARTRATE AND ACETAMINOPHEN 5; 325 MG/1; MG/1
1 TABLET ORAL EVERY 4 HOURS PRN
Status: CANCELLED | OUTPATIENT
Start: 2020-01-03 | End: 2020-01-05

## 2020-01-03 RX ORDER — CALCIUM CARBONATE 200(500)MG
2 TABLET,CHEWABLE ORAL 3 TIMES DAILY PRN
Status: CANCELLED | OUTPATIENT
Start: 2020-01-03

## 2020-01-03 RX ORDER — PRIMIDONE 250 MG/1
250 TABLET ORAL DAILY
Status: DISCONTINUED | OUTPATIENT
Start: 2020-01-04 | End: 2020-01-10 | Stop reason: HOSPADM

## 2020-01-03 RX ORDER — PANTOPRAZOLE SODIUM 40 MG/1
40 TABLET, DELAYED RELEASE ORAL EVERY MORNING
Status: CANCELLED | OUTPATIENT
Start: 2020-01-04

## 2020-01-03 RX ORDER — ALBUTEROL SULFATE 2.5 MG/3ML
2.5 SOLUTION RESPIRATORY (INHALATION) EVERY 4 HOURS PRN
Status: DISCONTINUED | OUTPATIENT
Start: 2020-01-03 | End: 2020-01-10 | Stop reason: HOSPADM

## 2020-01-03 RX ORDER — HYDROCODONE BITARTRATE AND ACETAMINOPHEN 5; 325 MG/1; MG/1
1 TABLET ORAL EVERY 4 HOURS PRN
Status: DISCONTINUED | OUTPATIENT
Start: 2020-01-03 | End: 2020-01-10 | Stop reason: HOSPADM

## 2020-01-03 RX ORDER — ROSUVASTATIN CALCIUM 10 MG/1
40 TABLET, COATED ORAL NIGHTLY
Status: CANCELLED | OUTPATIENT
Start: 2020-01-03

## 2020-01-03 RX ORDER — SODIUM CHLORIDE 9 MG/ML
40 INJECTION, SOLUTION INTRAVENOUS AS NEEDED
Status: CANCELLED | OUTPATIENT
Start: 2020-01-03

## 2020-01-03 RX ORDER — IPRATROPIUM BROMIDE AND ALBUTEROL SULFATE 2.5; .5 MG/3ML; MG/3ML
3 SOLUTION RESPIRATORY (INHALATION)
Status: DISCONTINUED | OUTPATIENT
Start: 2020-01-03 | End: 2020-01-10 | Stop reason: HOSPADM

## 2020-01-03 RX ORDER — NITROGLYCERIN 0.4 MG/1
0.4 TABLET SUBLINGUAL
Status: CANCELLED | OUTPATIENT
Start: 2020-01-03

## 2020-01-03 RX ORDER — ACETAMINOPHEN 160 MG/5ML
650 SOLUTION ORAL EVERY 4 HOURS PRN
Status: CANCELLED | OUTPATIENT
Start: 2020-01-03

## 2020-01-03 RX ORDER — CARBAMAZEPINE 200 MG/1
200 TABLET ORAL 2 TIMES DAILY
Status: DISCONTINUED | OUTPATIENT
Start: 2020-01-03 | End: 2020-01-10 | Stop reason: HOSPADM

## 2020-01-03 RX ORDER — ACETAMINOPHEN 325 MG/1
650 TABLET ORAL EVERY 4 HOURS PRN
Status: DISCONTINUED | OUTPATIENT
Start: 2020-01-03 | End: 2020-01-10 | Stop reason: HOSPADM

## 2020-01-03 RX ORDER — CHOLECALCIFEROL (VITAMIN D3) 125 MCG
5 CAPSULE ORAL NIGHTLY PRN
Status: DISCONTINUED | OUTPATIENT
Start: 2020-01-03 | End: 2020-01-10 | Stop reason: HOSPADM

## 2020-01-03 RX ORDER — NITROGLYCERIN 0.4 MG/1
0.4 TABLET SUBLINGUAL
Status: DISCONTINUED | OUTPATIENT
Start: 2020-01-03 | End: 2020-01-10 | Stop reason: HOSPADM

## 2020-01-03 RX ORDER — CARBAMAZEPINE 200 MG/1
200 TABLET ORAL 2 TIMES DAILY
Status: CANCELLED | OUTPATIENT
Start: 2020-01-03

## 2020-01-03 RX ORDER — SODIUM CHLORIDE 9 MG/ML
40 INJECTION, SOLUTION INTRAVENOUS AS NEEDED
Status: DISCONTINUED | OUTPATIENT
Start: 2020-01-03 | End: 2020-01-10 | Stop reason: HOSPADM

## 2020-01-03 RX ORDER — IPRATROPIUM BROMIDE AND ALBUTEROL SULFATE 2.5; .5 MG/3ML; MG/3ML
3 SOLUTION RESPIRATORY (INHALATION)
Status: CANCELLED | OUTPATIENT
Start: 2020-01-03

## 2020-01-03 RX ORDER — FUROSEMIDE 20 MG/1
20 TABLET ORAL NIGHTLY
Status: CANCELLED | OUTPATIENT
Start: 2020-01-03

## 2020-01-03 RX ORDER — METOPROLOL SUCCINATE 50 MG/1
100 TABLET, EXTENDED RELEASE ORAL DAILY
Status: CANCELLED | OUTPATIENT
Start: 2020-01-04

## 2020-01-03 RX ORDER — PANTOPRAZOLE SODIUM 40 MG/1
40 TABLET, DELAYED RELEASE ORAL EVERY MORNING
Status: DISCONTINUED | OUTPATIENT
Start: 2020-01-04 | End: 2020-01-10 | Stop reason: HOSPADM

## 2020-01-03 RX ORDER — METOPROLOL SUCCINATE 50 MG/1
100 TABLET, EXTENDED RELEASE ORAL DAILY
Status: DISCONTINUED | OUTPATIENT
Start: 2020-01-04 | End: 2020-01-06

## 2020-01-03 RX ORDER — ACETAMINOPHEN 650 MG/1
650 SUPPOSITORY RECTAL EVERY 4 HOURS PRN
Status: CANCELLED | OUTPATIENT
Start: 2020-01-03

## 2020-01-03 RX ORDER — ROSUVASTATIN CALCIUM 5 MG/1
40 TABLET, COATED ORAL NIGHTLY
Status: DISCONTINUED | OUTPATIENT
Start: 2020-01-03 | End: 2020-01-10 | Stop reason: HOSPADM

## 2020-01-03 RX ORDER — FUROSEMIDE 20 MG/1
20 TABLET ORAL NIGHTLY
Status: DISCONTINUED | OUTPATIENT
Start: 2020-01-03 | End: 2020-01-08

## 2020-01-03 RX ORDER — HYDROCODONE BITARTRATE AND ACETAMINOPHEN 5; 325 MG/1; MG/1
1 TABLET ORAL EVERY 4 HOURS PRN
Status: DISCONTINUED | OUTPATIENT
Start: 2020-01-03 | End: 2020-01-03

## 2020-01-03 RX ADMIN — MICONAZOLE NITRATE: 20 CREAM TOPICAL at 09:17

## 2020-01-03 RX ADMIN — PRIMIDONE 250 MG: 250 TABLET ORAL at 09:16

## 2020-01-03 RX ADMIN — MICONAZOLE NITRATE: 20 POWDER TOPICAL at 21:24

## 2020-01-03 RX ADMIN — FUROSEMIDE 20 MG: 40 TABLET ORAL at 21:24

## 2020-01-03 RX ADMIN — FUROSEMIDE 60 MG: 40 TABLET ORAL at 09:16

## 2020-01-03 RX ADMIN — IPRATROPIUM BROMIDE AND ALBUTEROL SULFATE 3 ML: .5; 3 SOLUTION RESPIRATORY (INHALATION) at 07:52

## 2020-01-03 RX ADMIN — CARBAMAZEPINE 200 MG: 200 TABLET ORAL at 21:24

## 2020-01-03 RX ADMIN — SODIUM CHLORIDE, PRESERVATIVE FREE 10 ML: 5 INJECTION INTRAVENOUS at 09:18

## 2020-01-03 RX ADMIN — HYDROCODONE BITARTRATE AND ACETAMINOPHEN 1 TABLET: 5; 325 TABLET ORAL at 21:23

## 2020-01-03 RX ADMIN — METOPROLOL SUCCINATE 100 MG: 50 TABLET, EXTENDED RELEASE ORAL at 09:16

## 2020-01-03 RX ADMIN — CARBAMAZEPINE 200 MG: 200 TABLET ORAL at 09:16

## 2020-01-03 RX ADMIN — PANTOPRAZOLE SODIUM 40 MG: 40 TABLET, DELAYED RELEASE ORAL at 06:47

## 2020-01-03 RX ADMIN — ROSUVASTATIN CALCIUM 40 MG: 5 TABLET, FILM COATED ORAL at 21:23

## 2020-01-03 RX ADMIN — TUBERCULIN PURIFIED PROTEIN DERIVATIVE 5 UNITS: 5 INJECTION INTRADERMAL at 14:48

## 2020-01-03 NOTE — PLAN OF CARE
Problem: Patient Care Overview  Goal: Plan of Care Review  Outcome: Ongoing (interventions implemented as appropriate)  Flowsheets (Taken 1/2/2020 2006)  Progress: no change  Plan of Care Reviewed With: patient  Outcome Summary: Patient here for UTI, receiving IV rocephine daily. Patient has brace on right left for fracture. Awaiting precert for skilled care for possible rehab until fracture heals. Patient sat in chair most of day, minimal help from patient to ambulate or transfer from bed to chair.

## 2020-01-03 NOTE — DISCHARGE SUMMARY
Destiny Catherine  1955  [id number]    Hospitalists Discharge Summary    Date of Admission: 12/26/2019  Date of Discharge:  1/3/2020    History of Present Illness: (from H&P)  Ms. Catherine is a 65 y/o  female who presents after suffering a fall today in the bathroom.  She reports she just tripped.  She denies striking her head.  She denies losing consciousness.  She reports an additional fall while using her walker earlier.  However this most recent fall, she was unable to get back up because of pain in her knee.  She denies nausea and vomiting.  She denies diarrhea.  She denies dysuria and frequency (although she takes her Lasix as prescribed).  She denies fever and chills.  She feels weak, but she also feels that is her baseline.  She is compliant w/ her O2 therapy.  She denies chest pain and near syncope.  Baseline dyspnea.    Hospital Course Summary:  Primary Discharge Diagnoses:   -Right Femoral Condyle Fracture with Large Effusion/Hemarthrosis:   Currently pain controlled on oral [place].  Dr. Reynoso with orthopedic surgery saw the patient here and currently this is being treated conservatively with a long leg hinged knee brace for stabilization and patient is weightbearing as tolerated with brace locked in extension. Patient is not participating well with PT/OT, will continue to encourage active participation.  Patient will need to be seen back in Dr. Reynoso's office in 1 to 2 weeks.  To Skilled care and rehab today.     -E coli UTI: Completed 7 day course of Rocephin.      Secondary Discharge Diagnoses:   -Chronic Hyponatremia:   Appears chronic in this patient with low sodium levels dating back to 2015 and beyond.  The patient's serum osmolality is very slightly low.  She appears euvolemic on examination.  Urine osm would appear c/w SIADH by Urine sodium is usually > 20mEq/L and hers is less than. I suspect this may be secondary to the patient's carbamazepine (in this case the mechanism is usually  SIADH) +/- lasix. Fluid restriction was lifted yesterday, if sodium worsens would consider resuming with monitoring of sodium level for any improvement. Recheck a BMP today.  If sodium stable or improving can monitor intermittently.     -Chronic A-fib:   Heart rate is at goal.  Patient is on her home dose of Toprol-XL.  She follows with Dr. Musa as an outpatient, not a candidate for anticoagulation.  No acute issues here.     -Chronic dCHF:  No acute issues here.  On home dose of Lasix.  Appears euvolemic on examination.     -Chronic Hypoxic Resp Failure:   -Probable COPD:  -Pulmonary HTN:   -Abnormal chest CT:  Pulmonary followed the patient here.  Echo done earlier this month shows severe pulmonary hypertension and pulmonary notes the patient will likely need a right heart cath at some point.  Chest CT concerning for ILD, serologies pending.  Patient needs to follow-up in the pulmonary office in 4 to 6 weeks status post discharge.  She will require outpatient PFTs and eventually a repeat CT scan, timing will be determined on an outpatient basis.  Continue current oxygen at 2 L nasal cannula.  Continue duo nebs 4 times daily.     -CKD stage III:   Patient's renal function is at her baseline.     -CAD with history of MI and prior stent placement:  -History of CVA:  No acute issues here currently.  Patient continued on her home dose of Crestor. Brilinta on hold here secondary to hemarthrosis but I spoke with Dr. Reynoso and he is ok to resume. Will monitor CBC intermittently.     -Chronic normocytic anemia:  Baseline hemoglobin appears 9-10 as far back as March 2018.  Has no signs or symptoms of overt bleeding other than the hemarthrosis noted related to her fracture.  Hemoglobin here has been fairly stable in her normal range.    PCP  Patient Care Team:  Becca Unger MD as PCP - General  Becca Unger MD as PCP - Family Medicine    Consults:   Consults     Date and Time Order Name Status Description     12/28/2019 0030 Inpatient Pulmonology Consult Completed     12/27/2019 0846 Inpatient Orthopedic Surgery Consult Completed     12/17/2019 0022 Inpatient Cardiology Consult Completed           Operations and Procedures Performed:       Xr Chest 2 View    Result Date: 12/26/2019  Narrative: CHEST X-RAY, 12/26/2019     HISTORY: 64-year-old female in the ED with left side chest pain after a fall in bathroom this morning.  TECHNIQUE: AP and lateral chest series obtained portably.  FINDINGS: There are several old healed right rib fractures including the 4th, 5th, 6th and 7th ribs. No visible left rib fracture. No pneumothorax, pulmonary consolidation or pleural effusion. Moderate cardiomegaly. Hiatal hernia.      Impression: 1. No active disease. 2. Multiple old right rib fractures. 3. Cardiomegaly. 4. Hiatal hernia.  This report was finalized on 12/26/2019 11:10 AM by Dr. Forrest Brooks MD.      Xr Knee 1 Or 2 View Right    Result Date: 12/26/2019  Narrative: RIGHT KNEE, 12/26/2019     HISTORY: 64-year-old female in the ED with right knee pain after a fall in bathroom this morning.  TECHNIQUE: AP and cross table lateral radiograph since the right knee.  FINDINGS: There is a very large knee joint effusion. No acute  Fracture or dislocation is visible.      Impression: 1. No acute osseous abnormality. 2. Very large knee joint effusion.  This report was finalized on 12/26/2019 11:07 AM by Dr. Forrest Brooks MD.      Ct Angiogram Chest With & Without Contrast    Result Date: 12/28/2019  Narrative: CT ANGIOGRAM CHEST W WO CONTRAST INDICATION: Pulmonary hypertension. Recent admission for femoral fracture on the right. Weakness and confusion. TECHNIQUE: CT angiogram of the chest with 100 cc of Isovue-300 IV contrast. 3-D reconstructions were obtained and reviewed.   Radiation dose reduction techniques included automated exposure control or exposure modulation based on body size. Count of known CT and cardiac nuc med  studies performed in previous 12 months: 1. COMPARISON: None available. FINDINGS: Chest images at mediastinal window show no adenopathy or effusions. No pulmonary artery filling defects are seen to suggest emboli. The central pulmonary arteries are prominent consistent with pulmonary hypertension. The main pulmonary artery measures 3.8 cm in diameter. Both the left and right atria are enlarged more extensively on the right. Coronary artery calcifications are seen in all 3 coronary distributions. There is a moderate sliding hiatal hernia. Reflux of contrast is noted into the hepatic veins consistent with elevated right heart pressures. Chest images at lung window show a thick walled bleb at the right lung base. Interstitial markings are generally prominent bilaterally.     Impression: There are findings of pulmonary hypertension as detailed above. No pulmonary emboli are noted. Three-vessel coronary artery disease is seen. The lungs show diffuse interstitial lung disease bilaterally possibly secondary to pulmonary hypertension. There is a thick walled bleb at the right lung base measuring 2.5 x 4 cm in transverse diameter. Signer Name: Ortiz Hernandez MD  Signed: 12/28/2019 1:32 PM  Workstation Name: [id number]  [facility] Lung Ventilation Perfusion    Result Date: 12/16/2019  Narrative: NM Pulm Vent And Perf HISTORY: 64-year-old female with shortness of breath for one day and swollen left leg. Elevated d-dimer. DOSE: *  28.4 mCi Tc99m DTPA. *  5.8 mCi Tc99m MAA. COMPARISON: Correlation is made to chest radiograph 12/16/2019. FINDINGS: Ventilation:  Patchy distribution of radiotracer and centralized clumping of the radiotracer indicates an obstructive lung disease. Perfusion:  There is uniform distribution of radiotracer throughout both lungs. No ventilation/perfusion mismatch to suggest a pulmonary embolus.     Impression: Low probability ventilation/perfusion scan for pulmonary  embolus. Signer Name: Kike Matthews MD  Signed: 12/16/2019 7:30 PM  Workstation Name: [id number]  [facility]    Xr Chest 1 View    Result Date: 12/16/2019  Narrative: CR Chest 1 Vw INDICATION: Shortness of air with swelling in legs today COMPARISON:  Chest film 3/2/2015 FINDINGS: Single portable AP view(s) of the chest.  There is stable cardiomegaly with tortuous aorta. There is mild pulmonary venous distention and mild interstitial prominence in both lungs diffusely fairly similar to or slightly improved from 3/2/2015.     Impression: Cardiomegaly with pulmonary venous distention and bilateral diffuse interstitial changes stable to improved over 3/2/2015. This could represent chronic change, however, recurrent of pulmonary edema could have this appearance. Note that the lungs did not demonstrate a diffuse interstitial pattern on prior chest film 6/12/2010 Signer Name: Trish Lubin MD  Signed: 12/16/2019 5:09 PM  Workstation Name: [email or web address]  [facility]    Us Renal Bilateral    Result Date: 12/17/2019  Narrative: BILATERAL RENAL ULTRASOUND, 12/17/2019  HISTORY: Edema and shortness of air and elevated creatinine.  COMPARISON: None  TECHNIQUE: Grayscale ultrasound imaging of both kidneys and the urinary bladder was performed.  FINDINGS: There is a 7 mm cyst in the right kidney.. . Right Kidney is  10.6cms, Left kidney is 9.5cms.. The kidneys otherwise appear normal. The bladder is collapsed        Impression: 7 mm right renal cyst otherwise normal  This report was finalized on 12/17/2019 4:23 PM by Dr. King Mayers MD.      Ct Lower Extremity Right Without Contrast    Result Date: 12/26/2019  Narrative: CT RIGHT KNEE, 12/26/2019  HISTORY: 64-year-old female in the ED with right knee pain after a fall in the bathroom this morning. X-ray series showed no fracture but did show a large knee joint effusion.  TECHNIQUE:  Axial CT images through the right knee with  multiplanar image reconstruction. Radiation dose reduction techniques included automated exposure control or exposure modulation based on body size. Radiation audit for CT and nuclear cardiology exams in the last 12 months: 0.  FINDINGS:  The examination shows a large hemarthrosis filling the joint capsule and distending the suprapatellar bursa. No osseous intra-articular loose body.  There is a small, nondisplaced, subcortical fracture extending vertically across the medial margin of the medial femoral condyle. This corresponds to the location of the MCL attachment. No other knee fracture is demonstrated. No fracture of the tibial plateau. No displacement of the patella.  Mild degenerative arthropathy with greatest involvement of the lateral tibial plateau. Minimal articular chondrocalcinosis.      Impression: 1.  Nondisplaced vertical subcortical fracture along the medial margin of the medial femoral condyle. This corresponds to the MCL attachment. 2.  No additional acute knee fracture. 3.  Large hemarthrosis. 4.  Degenerative arthropathy with articular changes along the surface of the lateral tibial plateau.  This report was finalized on 12/26/2019 1:55 PM by Dr. Forrest Brooks MD.      Xr Chest Pa & Lateral    Result Date: 12/18/2019  Narrative: CHEST 2 VIEWS 12/18/2019  HISTORY: Acute onset of shortness of breath today, patient on supplemental oxygen. Chronic diastolic heart failure and atrial fibrillation. Benign essential hypertension.  FINDINGS: The heart is enlarged but stable compared with 12/16/2019. There are interstitial infiltrates seen diffusely throughout both lungs suggesting mild pulmonary edema. Poor inspiratory result with bibasilar atelectasis. There are no pleural effusions. No pneumothorax.      Impression: Cardiomegaly and mild pulmonary edema.  This report was finalized on 12/18/2019 3:45 PM by Dr. Jeff Moya MD.        Allergies:  is allergic to plavix [clopidogrel bisulfate] and  keflex [cephalexin].    Robbi  reviewed    Discharge Medications:    Current Facility-Administered Medications:   •  acetaminophen (TYLENOL) tablet 650 mg, 650 mg, Oral, Q4H PRN **OR** acetaminophen (TYLENOL) 160 MG/5ML solution 650 mg, 650 mg, Oral, Q4H PRN **OR** acetaminophen (TYLENOL) suppository 650 mg, 650 mg, Rectal, Q4H PRN, Ester Delgado MD  •  calcium carbonate (TUMS) chewable tablet 500 mg (200 mg elemental), 2 tablet, Oral, TID PRN, Anival Crawford MD, 2 tablet at 12/30/19 2029  •  carBAMazepine (TEGretol) tablet 200 mg, 200 mg, Oral, BID, Ester Delgado MD, 200 mg at 01/03/20 0916  •  furosemide (LASIX) tablet 20 mg, 20 mg, Oral, Nightly, Anival Crawford MD, 20 mg at 01/02/20 2258  •  furosemide (LASIX) tablet 60 mg, 60 mg, Oral, Daily, Anival Crawford MD, 60 mg at 01/03/20 0916  •  HYDROcodone-acetaminophen (NORCO) 5-325 MG per tablet 1 tablet, 1 tablet, Oral, Q4H PRN, Ester Delgado MD, 1 tablet at 01/02/20 2257  •  ipratropium-albuterol (DUO-NEB) nebulizer solution 3 mL, 3 mL, Nebulization, 4x Daily - RT, Ester Delgado MD, 3 mL at 01/03/20 0752  •  melatonin tablet 5 mg, 5 mg, Oral, Nightly PRN, Ester Delgado MD, 5 mg at 01/02/20 2257  •  metoprolol succinate XL (TOPROL-XL) 24 hr tablet 100 mg, 100 mg, Oral, Daily, Ester Delgado MD, 100 mg at 01/03/20 0916  •  miconazole (MICOTIN) 2 % cream, , Topical, Q12H, Anival Crawford MD  •  miconazole (MICOTIN) 2 % powder, , Topical, Q12H, Radha Payne MD  •  nitroglycerin (NITROSTAT) SL tablet 0.4 mg, 0.4 mg, Sublingual, Q5 Min PRN, Ester Delgado MD  •  pantoprazole (PROTONIX) EC tablet 40 mg, 40 mg, Oral, QAM, Ester Delgado MD, 40 mg at 01/03/20 0647  •  primidone (MYSOLINE) tablet 250 mg, 250 mg, Oral, Daily, Ester Delgado MD, 250 mg at 01/03/20 0916  •  rosuvastatin (CRESTOR) tablet 40 mg, 40 mg, Oral,  Nightly, Ester Delgado MD, 40 mg at 01/02/20 2259  •  [COMPLETED] Insert peripheral IV, , , Once **AND** sodium chloride 0.9 % flush 10 mL, 10 mL, Intravenous, PRN, Ester Delgado MD  •  sodium chloride 0.9 % flush 10 mL, 10 mL, Intravenous, PRN, Ester Delgado MD  •  sodium chloride 0.9 % flush 10 mL, 10 mL, Intravenous, Q12H, Ester Delgado MD, 10 mL at 01/03/20 0918  •  sodium chloride 0.9 % infusion 40 mL, 40 mL, Intravenous, PRN, Ester Delgado MD   Brilinta 90 mg po BID     Last Lab Results:   Lab Results (most recent)     Procedure Component Value Units Date/Time    POC Glucose Once [[id number]]  (Normal) Collected:  01/03/20 0734    Specimen:  Blood Updated:  01/03/20 0743     Glucose 105 mg/dL     ANCA Panel [[id number]]  (Abnormal) Collected:  12/30/19 0456    Specimen:  Blood Updated:  01/02/20 1708     Myeloperoxidase Ab 14.3 U/mL      Antiproteinase 3 (NY-3) 9.2 U/mL      C-ANCA <1:20 titer      P-ANCA <1:20 titer      Comment: The presence of positive fluorescence exhibiting P-ANCA or C-ANCA  patterns alone is not specific for the diagnosis of Wegener's  Granulomatosis (WG) or microscopic polyangiitis. Decisions about  treatment should not be based solely on ANCA IFA results.  The  International ANCA Group Consensus recommends follow up testing of  positive sera with both NY-3 and MPO-ANCA enzyme immunoassays. As  many as 5% serum samples are positive only by EIA.  Ref. AM J Clin Pathol 1999;111:507-513.        Atypical pANCA <1:20 titer      Comment: The atypical pANCA pattern has been observed in a significant  percentage of patients with ulcerative colitis, primary sclerosing  cholangitis and autoimmune hepatitis.       Narrative:       Performed at:  01 - [facility]  [address], [place]  [id number]  : Nano Mayo MD, Phone:  [phone]  Performed at:  02 - [facility]  [address],  [place], [place]  [id number]  : Umesh Colon PhD, Phone:  [phone]    Osmolality, Urine - Urine, Clean Catch [[id number]] Collected:  01/02/20 0007    Specimen:  Urine, Clean Catch Updated:  01/02/20 1133     Osmolality, Urine 325 mOsm/kg     Narrative:       Osmo Normal Reference Ranges:    Random:  mOsm/kg H2O, depending on fluid intake.  Random: >850 mOsm/kg H20, after 12 hour fluid restriction.    24 Hour: 300-900 mOsm/kg H2O.    Procalcitonin [[id number]]  (Normal) Collected:  01/02/20 0356    Specimen:  Blood Updated:  01/02/20 0549     Procalcitonin 0.19 ng/mL     Basic Metabolic Panel [[id number]]  (Abnormal) Collected:  01/02/20 0356    Specimen:  Blood Updated:  01/02/20 0544     Glucose 98 mg/dL      BUN 24 mg/dL      Creatinine 1.01 mg/dL      Sodium 127 mmol/L      Potassium 3.5 mmol/L      Chloride 90 mmol/L      CO2 22.4 mmol/L      Calcium 8.1 mg/dL      eGFR Non African Amer 55 mL/min/1.73      BUN/Creatinine Ratio 23.8     Anion Gap 14.6 mmol/L     Narrative:       GFR Normal >60  Chronic Kidney Disease <60  Kidney Failure <15      Sodium, Urine, Random - Urine, Clean Catch [[id number]] Collected:  01/02/20 0007    Specimen:  Urine, Clean Catch Updated:  01/02/20 0031     Sodium, Urine <20 mmol/L     Narrative:       Reference intervals for random urine have not been established.  Clinical usage is dependent upon physician's interpretation in combination with other laboratory tests.       Basic Metabolic Panel [[id number]]  (Abnormal) Collected:  01/01/20 0330    Specimen:  Blood Updated:  01/01/20 0522     Glucose 89 mg/dL      BUN 22 mg/dL      Creatinine 1.17 mg/dL      Sodium 126 mmol/L      Potassium 3.6 mmol/L      Chloride 87 mmol/L      CO2 23.6 mmol/L      Calcium 8.8 mg/dL      eGFR Non African Amer 47 mL/min/1.73      BUN/Creatinine Ratio 18.8     Anion Gap 15.4 mmol/L     Narrative:       GFR Normal >60  Chronic Kidney Disease <60  Kidney Failure <15      Cyclic Citrul  Peptide Antibody, IgG / IgA [[id number]] Collected:  12/30/19 0456    Specimen:  Blood Updated:  12/31/19 2206     CCP Antibodies IgG/IgA 10 units      Comment:                           Negative               <20                            Weak positive      20 - 39                            Moderate positive  40 - 59                            Strong positive        >59       Narrative:       Performed at:  01 - [facility] [address]  [id number]  : Nano Mayo MD, Phone:  [phone]    C3+C4+ANNA+RA [[id number]] Collected:  12/30/19 0456    Specimen:  Blood Updated:  12/31/19 1409     C3 Complement 139 mg/dL      C4 Complement 20 mg/dL      ANNA Direct Negative     RA Latex Turbid 11.1 IU/mL     Narrative:       Performed at:  01 - [facility] [address]  [id number]  : Umesh Colon PhD, Phone:  [phone]    Osmolality, Serum [[id number]]  (Abnormal) Collected:  12/28/19 0327    Specimen:  Blood Updated:  12/30/19 1650     Osmolality 278 mOsm/kg     Vitamin D 25 Hydroxy [[id number]]  (Abnormal) Collected:  12/27/19 0453    Specimen:  Blood Updated:  12/30/19 1512     25 Hydroxy, Vitamin D 22.1 ng/ml      Comment: Results may be falsely increased if patient taking Biotin.  Appended report. These results have been appended to a previously final verified report.       Narrative:       Reference Range for Total Vitamin D 25(OH)     Deficiency <20.0 ng/mL   Insufficiency 21-29 ng/mL   Sufficiency  ng/mL  Toxicity >100 ng/ml    Magnesium [[id number]]  (Normal) Collected:  12/30/19 0456    Specimen:  Blood Updated:  12/30/19 0534     Magnesium 1.6 mg/dL     CBC & Differential [[id number]] Collected:  12/30/19 0456    Specimen:  Blood Updated:  12/30/19 0512    Narrative:       The following orders were created for panel order CBC & Differential.  Procedure                               Abnormality         Status                      ---------                               -----------         ------                     CBC Auto Differential[[id number]]        Abnormal            Final result                 Please view results for these tests on the individual orders.    CBC Auto Differential [[id number]]  (Abnormal) Collected:  12/30/19 0456    Specimen:  Blood Updated:  12/30/19 0512     WBC 9.85 10*3/mm3      RBC 3.79 10*6/mm3      Hemoglobin 9.4 g/dL      Hematocrit 30.4 %      MCV 80.2 fL      MCH 24.8 pg      MCHC 30.9 g/dL      RDW 19.0 %      RDW-SD 55.0 fl      MPV 10.7 fL      Platelets 181 10*3/mm3      Neutrophil % 76.7 %      Lymphocyte % 10.3 %      Monocyte % 7.6 %      Eosinophil % 4.0 %      Basophil % 0.6 %      Immature Grans % 0.8 %      Neutrophils, Absolute 7.56 10*3/mm3      Lymphocytes, Absolute 1.01 10*3/mm3      Monocytes, Absolute 0.75 10*3/mm3      Eosinophils, Absolute 0.39 10*3/mm3      Basophils, Absolute 0.06 10*3/mm3      Immature Grans, Absolute 0.08 10*3/mm3      nRBC 0.0 /100 WBC     Urine Culture - Urine, Urine, Clean Catch [[id number]]  (Abnormal) Collected:  12/27/19 1104    Specimen:  Urine, Clean Catch Updated:  12/28/19 1040     Urine Culture <25,000 CFU/mL Escherichia coli    Narrative:       Call if further workup needed.      BNP [[id number]]  (Abnormal) Collected:  12/28/19 0327    Specimen:  Blood Updated:  12/28/19 0803     proBNP 12,336.0 pg/mL     Narrative:       Among patients with dyspnea, NT-proBNP is highly sensitive for the detection of acute congestive heart failure. In addition NT-proBNP of <300 pg/ml effectively rules out acute congestive heart failure with 99% negative predictive value.      Magnesium [[id number]]  (Abnormal) Collected:  12/28/19 0327    Specimen:  Blood Updated:  12/28/19 0440     Magnesium 1.5 mg/dL     Phosphorus [[id number]]  (Normal) Collected:  12/28/19 0327    Specimen:  Blood Updated:  12/28/19 0438     Phosphorus 3.1 mg/dL     Procalcitonin [[id number]]  (Abnormal)  Collected:  12/28/19 0327    Specimen:  Blood Updated:  12/28/19 0438     Procalcitonin 0.33 ng/mL     CBC & Differential [[id number]] Collected:  12/28/19 0327    Specimen:  Blood Updated:  12/28/19 0414    Narrative:       The following orders were created for panel order CBC & Differential.  Procedure                               Abnormality         Status                     ---------                               -----------         ------                     CBC Auto Differential[[id number]]        Abnormal            Final result                 Please view results for these tests on the individual orders.    CBC Auto Differential [[id number]]  (Abnormal) Collected:  12/28/19 0327    Specimen:  Blood Updated:  12/28/19 0414     WBC 8.58 10*3/mm3      RBC 3.92 10*6/mm3      Hemoglobin 9.8 g/dL      Hematocrit 31.7 %      MCV 80.9 fL      MCH 25.0 pg      MCHC 30.9 g/dL      RDW 18.7 %      RDW-SD 54.9 fl      MPV 11.2 fL      Platelets 164 10*3/mm3      Neutrophil % 76.2 %      Lymphocyte % 10.5 %      Monocyte % 10.6 %      Eosinophil % 1.5 %      Basophil % 0.5 %      Immature Grans % 0.7 %      Neutrophils, Absolute 6.54 10*3/mm3      Lymphocytes, Absolute 0.90 10*3/mm3      Monocytes, Absolute 0.91 10*3/mm3      Eosinophils, Absolute 0.13 10*3/mm3      Basophils, Absolute 0.04 10*3/mm3      Immature Grans, Absolute 0.06 10*3/mm3      nRBC 0.0 /100 WBC     Urinalysis, Microscopic Only - Urine, Clean Catch [[id number]]  (Abnormal) Collected:  12/27/19 1104    Specimen:  Urine, Clean Catch Updated:  12/27/19 1151     RBC, UA 3-5 /HPF      WBC, UA 6-12 /HPF      Bacteria, UA 1+ /HPF      Squamous Epithelial Cells, UA 3-6 /HPF      Hyaline Casts, UA None Seen /LPF      Methodology Manual Light Microscopy    Urinalysis With Culture If Indicated - Urine, Clean Catch [[id number]]  (Abnormal) Collected:  12/27/19 1104    Specimen:  Urine, Clean Catch Updated:  12/27/19 1138     Color, UA Yellow     Appearance, UA Clear      pH, UA 6.0     Specific Gravity, UA 1.020     Glucose, UA Negative     Ketones, UA Negative     Bilirubin, UA Negative     Blood, UA Trace     Protein, UA 30 mg/dL (1+)     Leuk Esterase, UA Trace     Nitrite, UA Negative     Urobilinogen, UA 0.2 E.U./dL    TSH [[id number]]  (Normal) Collected:  12/26/19 0951    Specimen:  Blood Updated:  12/26/19 2308     TSH 1.230 uIU/mL     Carbamazepine Level, Total [[id number]]  (Normal) Collected:  12/26/19 0951    Specimen:  Blood Updated:  12/26/19 1643     Carbamazepine Level 4.4 mcg/mL     Urine Drug Screen - Urine, Clean Catch [[id number]]  (Abnormal) Collected:  12/26/19 1009    Specimen:  Urine, Clean Catch Updated:  12/26/19 1052     THC, Screen, Urine Negative     Phencyclidine (PCP), Urine Negative     Cocaine Screen, Urine Negative     Methamphetamine, Ur Negative     Opiate Screen Negative     Amphetamine Screen, Urine Negative     Benzodiazepine Screen, Urine Negative     Tricyclic Antidepressants Screen Negative     Methadone Screen, Urine Negative     Barbiturates Screen, Urine Positive     Oxycodone Screen, Urine Negative     Propoxyphene Screen Negative     Buprenorphine, Screen, Urine Negative    Narrative:       Urine drug screen results are to be used for medical purposes only.  They are not to be used for legal purposes such as employment testing.  Negative results do not necessarily mean the complete absence of a subtance, but rather that the result is less than the cutoff for that substance.  Positive results are unconfirmed and considered Preliminary Positive.  [facility] does not automatically confirm Postitive Unconfirmed results.  The physician may request (order) an Unconfirmed Positive result to be sent out for confirmation.      Negative Thresholds for Drugs Screened:    THC screen, urine                          50 ng/ml  Phenycyclidine (PCP), urine                25 ng/ml  Cocaine screen, urine                     150  ng/ml  Methamphetamine, urine                    500 ng/ml  Opiate screen, urine                      100 ng/ml  Amphetamine screen, urine                 500 ng/ml  Benzodiazepine screen, urine              150 ng/ml  Tricyclic Antidepressants screen, urine   300 ng/ml  Methadone screen, urine                   200 ng/ml  Barbiturates screen, urine                200 ng/ml  Oxycodone screen, urine                   100 ng/ml  Propoxyphene screen, urine                300 ng/ml  Buprenorphine screen, urine                10 ng/ml    Urinalysis, Microscopic Only - Urine, Catheter [[id number]]  (Abnormal) Collected:  12/26/19 1009    Specimen:  Urine, Catheter Updated:  12/26/19 1040     RBC, UA 3-5 /HPF      WBC, UA 3-5 /HPF      Bacteria, UA 4+ /HPF      Squamous Epithelial Cells, UA None Seen /HPF      Transitional Epithelial Cells, UA 0-2 /HPF      Hyaline Casts, UA 0-2 /LPF      Methodology Manual Light Microscopy    Urinalysis With Culture If Indicated - Urine, Catheter [[id number]]  (Abnormal) Collected:  12/26/19 1009    Specimen:  Urine, Catheter Updated:  12/26/19 1027     Color, UA Yellow     Appearance, UA Slightly Cloudy     pH, UA 5.5     Specific Gravity, UA 1.020     Glucose, UA Negative     Ketones, UA Negative     Bilirubin, UA Negative     Blood, UA Trace     Protein,  mg/dL (2+)     Leuk Esterase, UA Negative     Nitrite, UA Positive     Urobilinogen, UA 0.2 E.U./dL    Troponin [[id number]]  (Normal) Collected:  12/26/19 0951    Specimen:  Blood Updated:  12/26/19 1019     Troponin T 0.011 ng/mL     Narrative:       Troponin T Reference Range:  <= 0.03 ng/mL-   Negative for AMI  >0.03 ng/mL-     Abnormal for myocardial necrosis.  Clinicians would have to utilize clinical acumen, EKG, Troponin and serial changes to determine if it is an Acute Myocardial Infarction or myocardial injury due to an underlying chronic condition.     Comprehensive Metabolic Panel [[id number]]  (Abnormal) Collected:   12/26/19 0951    Specimen:  Blood Updated:  12/26/19 1017     Glucose 109 mg/dL      BUN 22 mg/dL      Creatinine 1.38 mg/dL      Sodium 136 mmol/L      Potassium 3.6 mmol/L      Chloride 98 mmol/L      CO2 24.6 mmol/L      Calcium 9.0 mg/dL      Total Protein 6.9 g/dL      Albumin 3.30 g/dL      ALT (SGPT) 8 U/L      AST (SGOT) 12 U/L      Alkaline Phosphatase 169 U/L      Total Bilirubin 0.3 mg/dL      eGFR Non African Amer 38 mL/min/1.73      Globulin 3.6 gm/dL      A/G Ratio 0.9 g/dL      BUN/Creatinine Ratio 15.9     Anion Gap 13.4 mmol/L     Narrative:       GFR Normal >60  Chronic Kidney Disease <60  Kidney Failure <15      CK [[id number]]  (Normal) Collected:  12/26/19 0951    Specimen:  Blood Updated:  12/26/19 1017     Creatine Kinase 37 U/L     Ethanol [[id number]] Collected:  12/26/19 0951    Specimen:  Blood Updated:  12/26/19 1017     Ethanol <10 mg/dL      Ethanol % <0.010 %     Influenza Antigen, Rapid - Swab, Nasopharynx [[id number]]  (Normal) Collected:  12/26/19 0951    Specimen:  Swab from Nasopharynx Updated:  12/26/19 1007     Influenza A Ag, EIA Negative     Influenza B Ag, EIA Negative    aPTT [[id number]]  (Normal) Collected:  12/26/19 0951    Specimen:  Blood Updated:  12/26/19 1007     PTT 35.8 seconds     Narrative:       PTT = The equivalent PTT values for the therapeutic range of heparin levels at 0.1 to 0.7 U/ml are 53 to 110 seconds.      Protime-INR [[id number]]  (Abnormal) Collected:  12/26/19 0951    Specimen:  Blood Updated:  12/26/19 1007     Protime 16.1 Seconds      INR 1.31    Narrative:       Therapeutic Ranges for INR: 2.0-3.0 (PT 20-30)                              2.5-3.5 (PT 25-34)        Imaging Results (Most Recent)     Procedure Component Value Units Date/Time    CT Angiogram Chest With & Without Contrast [[id number]] Collected:  12/28/19 1332     Updated:  12/28/19 1334    Narrative:       CT ANGIOGRAM CHEST W WO CONTRAST    INDICATION:   Pulmonary hypertension. Recent  admission for femoral fracture on the right. Weakness and confusion.    TECHNIQUE:   CT angiogram of the chest with 100 cc of Isovue-300 IV contrast. 3-D reconstructions were obtained and reviewed.   Radiation dose reduction techniques included automated exposure control or exposure modulation based on body size. Count of known CT and  cardiac nuc med studies performed in previous 12 months: 1.     COMPARISON:   None available.    FINDINGS:   Chest images at mediastinal window show no adenopathy or effusions. No pulmonary artery filling defects are seen to suggest emboli. The central pulmonary arteries are prominent consistent with pulmonary hypertension. The main pulmonary artery measures  3.8 cm in diameter. Both the left and right atria are enlarged more extensively on the right. Coronary artery calcifications are seen in all 3 coronary distributions. There is a moderate sliding hiatal hernia. Reflux of contrast is noted into the hepatic  veins consistent with elevated right heart pressures.    Chest images at lung window show a thick walled bleb at the right lung base. Interstitial markings are generally prominent bilaterally.      Impression:       There are findings of pulmonary hypertension as detailed above. No pulmonary emboli are noted. Three-vessel coronary artery disease is seen. The lungs show diffuse interstitial lung disease bilaterally possibly secondary to pulmonary hypertension. There  is a thick walled bleb at the right lung base measuring 2.5 x 4 cm in transverse diameter.    Signer Name: Ortiz Hernandez MD   Signed: 12/28/2019 1:32 PM   Workstation Name: [id number]    [facility]    CT Lower Extremity Right Without Contrast [[id number]] Collected:  12/26/19 1351     Updated:  12/26/19 1357    Narrative:       CT RIGHT KNEE, 12/26/2019     HISTORY:  64-year-old female in the ED with right knee pain after a fall in the  bathroom this morning. X-ray series showed no fracture  but did show a  large knee joint effusion.     TECHNIQUE:    Axial CT images through the right knee with multiplanar image  reconstruction. Radiation dose reduction techniques included automated  exposure control or exposure modulation based on body size. Radiation  audit for CT and nuclear cardiology exams in the last 12 months: 0.      FINDINGS:    The examination shows a large hemarthrosis filling the joint capsule and  distending the suprapatellar bursa. No osseous intra-articular loose  body.     There is a small, nondisplaced, subcortical fracture extending  vertically across the medial margin of the medial femoral condyle. This  corresponds to the location of the MCL attachment. No other knee  fracture is demonstrated. No fracture of the tibial plateau. No  displacement of the patella.     Mild degenerative arthropathy with greatest involvement of the lateral  tibial plateau. Minimal articular chondrocalcinosis.       Impression:       1.  Nondisplaced vertical subcortical fracture along the medial margin  of the medial femoral condyle. This corresponds to the MCL attachment.  2.  No additional acute knee fracture.  3.  Large hemarthrosis.  4.  Degenerative arthropathy with articular changes along the surface of  the lateral tibial plateau.     This report was finalized on 12/26/2019 1:55 PM by Dr. Forrest Brooks MD.       XR Chest 2 View [[id number]] Collected:  12/26/19 1107     Updated:  12/26/19 1112    Narrative:       CHEST X-RAY, 12/26/2019         HISTORY:  64-year-old female in the ED with left side chest pain after a fall in  bathroom this morning.     TECHNIQUE:  AP and lateral chest series obtained portably.     FINDINGS:  There are several old healed right rib fractures including the 4th, 5th,  6th and 7th ribs. No visible left rib fracture. No pneumothorax,  pulmonary consolidation or pleural effusion. Moderate cardiomegaly.  Hiatal hernia.       Impression:       1. No active disease.  2.  Multiple old right rib fractures.  3. Cardiomegaly.  4. Hiatal hernia.     This report was finalized on 12/26/2019 11:10 AM by Dr. Forrest Brooks MD.       XR Knee 1 or 2 View Right [[id number]] Collected:  12/26/19 1106     Updated:  12/26/19 1110    Narrative:       RIGHT KNEE, 12/26/2019         HISTORY:  64-year-old female in the ED with right knee pain after a fall in  bathroom this morning.     TECHNIQUE:  AP and cross table lateral radiograph since the right knee.     FINDINGS:  There is a very large knee joint effusion. No acute     Fracture or dislocation is visible.       Impression:       1. No acute osseous abnormality.  2. Very large knee joint effusion.     This report was finalized on 12/26/2019 11:07 AM by Dr. Forrest Brooks MD.             PROCEDURES      Condition on Discharge:  Stable    Physical Exam at Discharge  Vital Signs  Temp:  [96.3 °F (35.7 °C)-97.9 °F (36.6 °C)] 97.7 °F (36.5 °C)  Heart Rate:  [75-95] 95  Resp:  [16-20] 16  BP: ()/(68-85) 112/85    Physical Exam   Constitutional: She appears well-developed and well-nourished. No distress.   Appears older than her stated age. Sleeping but woke easily and answered my questions.  HENT:   Head: Normocephalic and atraumatic.   Eyes: Conjunctivae and EOM are normal. No scleral icterus.   Cardiovascular: Normal rate and normal heart sounds. Exam reveals no gallop and no friction rub.   No murmur heard.  Irregularly irregular rhythm   Pulmonary/Chest: Effort normal. No respiratory distress. She has no wheezes. She has no rales.   Diminished breath sounds throughout.   Abdominal: Soft. Bowel sounds are normal. There is no tenderness.   Musculoskeletal: She exhibits no edema.   Immobilizer brace present to the right lower extremity.   Neurological: She is alert and oriented to person, place, and time.   Psychiatric:   Flat affect.   Vitals reviewed.    Discharge Disposition  To [facility].    Visiting Nurse:     N/A    Home PT/OT:  N/A    Home Safety Evaluation:  N/A    DME  None    Discharge Diet:      Dietary Orders (From admission, onward)     Start     Ordered    01/02/20 0912  Diet Regular  Diet Effective Now     Comments:  Pt ok to have pepsi or diet pepsi per verbal from Dr Crawford   Question:  Diet Texture / Consistency  Answer:  Regular    01/02/20 0911    12/27/19 1847  DIET MESSAGE Patient does not want gravy sent with meals and does not like oatmeal.  Please see for preferences for each meal.  Send yogurt with all meals.  Once     Comments:  Patient does not want gravy sent with meals and does not like oatmeal.  Please see for preferences for each meal.  Send yogurt with all meals.    12/27/19 1847                Activity at Discharge:  WBAT with long leg hinged knee brace in place locked in extension.    Pre-discharge education  Medications and F/U      Follow-up Appointments  Future Appointments   Date Time Provider Department Center   5/12/2020  2:00 PM Lucas Damon MD [facility] None         Test Results Pending at Discharge       Ester Delgado MD  01/03/20  10:45 AM    Time: Discharge >30 min (Secondary to coordination of care with discharge planning and skilled care unit, talking to Dr. Reynoso and PT/OT, exam, orders, chart review and documentation)

## 2020-01-03 NOTE — PLAN OF CARE
Problem: Patient Care Overview  Goal: Plan of Care Review  Flowsheets (Taken 1/3/2020 1038)  Plan of Care Reviewed With: patient  Outcome Summary: PT: Patient performs supine to sit transfer with min/mod A, sit to stand transfer from bed with CGA however requires mod/max A to safely transition to sit in chair due to poor safety awareness and being unreceptive to cues to improve transfer. She performs gait x 4 feet from bed to chair with CGA to max A with use of FWW. She requires max cues and mod/max A to safely perform directional change. She refuses further gait training or transfers. She continues to require max encouragement for miminal participation and effort. Overall rehab potential continues to be poor due to patient motivation and willingness to participate

## 2020-01-03 NOTE — SIGNIFICANT NOTE
"   01/03/20 0942   Rehab Treatment   Discipline physical therapist   Reason Treatment Not Performed patient/family declined treatment  (attempted to see x2 this AM.  pt refused despite encouragement.  Patient reports \"I don't have any good reason, I just don't want to\".  will follow up later this AM, as patient states \"I might do it if you come back later, I am not sure\")     "

## 2020-01-03 NOTE — THERAPY EVALUATION
Acute Care - Occupational Therapy Initial Evaluation   Pat Jung     Patient Name: Destiny Catherine  : 1955  MRN: [id number]  Today's Date: 1/3/2020  Onset of Illness/Injury or Date of Surgery: 19  Date of Referral to OT: 20  Referring Physician: Sandy    Admit Date: 1/3/2020     No diagnosis found.  Patient Active Problem List   Diagnosis   • Atrial fibrillation (CMS/HCC)   • Chronic diastolic heart failure (CMS/HCC)   • Hypertension   • Pulmonary hypertension (CMS/HCC)   • Nonruptured cerebral aneurysm   • Coronary artery disease involving native coronary artery of native heart without angina pectoris   • History of renal stent   • Tobacco abuse   • Epilepsy (CMS/HCC)   • History of colonic polyps   • History of abnormal cervical Pap smear   • HPV in female   • Acute on chronic respiratory failure with hypoxia (CMS/HCC)   • Chronic right-sided heart failure (CMS/HCC)   • Urinary tract infection without hematuria     Past Medical History:   Diagnosis Date   • Anemia    • Chronic diastolic heart failure (CMS/HCC) 2017   • Colon polyp    • COPD (chronic obstructive pulmonary disease) (CMS/HCC)    • Coronary artery disease involving native coronary artery of native heart without angina pectoris 2017   • Epilepsy (CMS/HCC)    • History of abnormal cervical Pap smear 2019   • History of renal stent 2017   • HPV in female 2019   • Hyperlipidemia    • Hypertension    • Malignant neoplasm of anus (CMS/HCC) 2018   • Nonruptured cerebral aneurysm    • Permanent atrial fibrillation    • Pulmonary hypertension (CMS/HCC)    • Stroke (CMS/HCC)      and 2010- [facility]   • Tobacco abuse    • UTI (urinary tract infection)      Past Surgical History:   Procedure Laterality Date   • APPENDECTOMY     • CEREBRAL ANGIOGRAM      [facility]   • CHOLECYSTECTOMY     • COLONOSCOPY N/A 2019    Procedure: COLONOSCOPY; POLYPECTOMY;  Surgeon: Deborah Cedeno MD;   Location: [facility] OR;  Service: Gastroenterology   • CORONARY STENT PLACEMENT     • TOTAL HIP ARTHROPLASTY            OT ASSESSMENT FLOWSHEET (last 12 hours)      Occupational Therapy Evaluation     Row Name 01/03/20 1423                   OT Evaluation Time/Intention    Subjective Information  complains of;fatigue  -JJ        Document Type  evaluation  -JJ        Mode of Treatment  occupational therapy  -JJ        Patient Effort  fair  -JJ        Comment  pt agreeable to minimal activity with encouragement  -JJ           General Information    Patient Profile Reviewed?  yes  -JJ        Onset of Illness/Injury or Date of Surgery  12/26/19  -JJ        Referring Physician  Sandy  -JJ        Patient Observations  lethargic;agree to therapy  -JJ        General Observations of Patient  pt reclined in bedside chair, lethargic, agreed to evaluation  -JJ        Prior Level of Function  -- see pertinent hx of current problem  -JJ        Equipment Currently Used at Home  rollator;oxygen;shower chair;commode, bedside  -JJ        Pertinent History of Current Functional Problem  pt admitted to hospital after suffering mechanical fall at home. pt diagnosed with UTI and R femoral condyle fracture. per ortho pt is wbat with hinged knee brace in place at all times. pt states she lives with spouse who assists her with shower transfers and bathing at times and does all the cooking. pt states she is independent with dressing and uses rollator for all household mobility  -JJ        Existing Precautions/Restrictions  fall;oxygen therapy device and L/min 2L O2 NC  -JJ        Limitations/Impairments  safety/cognitive  -JJ        Risks Reviewed  patient:;increased discomfort  -JJ        Benefits Reviewed  patient:;improve function;increase independence  -JJ        Barriers to Rehab  previous functional deficit  -JJ           Relationship/Environment    Lives With  spouse  -JJ           Resource/Environmental Concerns    Current Living  Arrangements  home/apartment/condo  -           Home Main Entrance    Number of Stairs, Main Entrance  one  -        Stair Railings, Main Entrance  none  -           Cognitive Assessment/Interventions    Additional Documentation  Cognitive Assessment/Intervention (Group)  -           Cognitive Assessment/Intervention- PT/OT    Orientation Status (Cognition)  oriented x 3  -        Follows Commands (Cognition)  WFL;verbal cues/prompting required;repetition of directions required  -        Personal Safety Interventions  gait belt;nonskid shoes/slippers when out of bed  -           Safety Issues, Functional Mobility    Safety Issues Affecting Function (Mobility)  insight into deficits/self awareness;positioning of assistive device;judgment;impulsivity  -           Mobility Assessment/Treatment    Extremity Weight-bearing Status  left lower extremity  -        Right Lower Extremity (Weight-bearing Status)  -- WBAT on R LE with hinged knee brace  -           Bed Mobility Assessment/Treatment    Comment (Bed Mobility)  deferred up in chair  -           Transfer Assessment/Treatment    Transfer Assessment/Treatment  sit-stand transfer;stand-sit transfer  -        Comment (Transfers)  pt required verbal cues for hand placement  -           Sit-Stand Transfer    Sit-Stand [place] (Transfers)  contact guard;verbal cues  -        Assistive Device (Sit-Stand Transfers)  walker, 4-wheeled  -           Stand-Sit Transfer    Stand-Sit [place] (Transfers)  contact guard;verbal cues  -        Assistive Device (Stand-Sit Transfers)  walker, 4-wheeled  -           Bathing Assessment/Intervention    Bathing [place] Level  lower body;bathing skills;moderate assist (50% patient effort)  -           Lower Body Dressing Assessment/Training    Lower Body Dressing [place] Level  lower body dressing skills;moderate assist (50% patient effort)  -           General ROM    GENERAL ROM  COMMENTS  B UE AROM WFL  -JJ           MMT (Manual Muscle Testing)    General MMT Comments  B UE MMT WFL  -JJ           Static Standing Balance    Level of [place] (Supported Standing, Static Balance)  contact guard assist  -JJ        Time Able to Maintain Position (Supported Standing, Static Balance)  30 to 45 seconds  -JJ        Assistive Device Utilized (Supported Standing, Static Balance)  -- rollator  -JJ        Comment (Supported Standing, Static Balance)  pt required encouragement for full effort with activity  -JJ           Positioning and Restraints    Pre-Treatment Position  sitting in chair/recliner  -JJ        Post Treatment Position  chair  -JJ        In Chair  reclined;call light within reach;encouraged to call for assist;with nsg  -JJ           Pain Scale: Numbers Pre/Post-Treatment    Pain Scale: Numbers, Pretreatment  0/10 - no pain  -JJ        Pain Scale: Numbers, Post-Treatment  0/10 - no pain  -JJ           Plan of Care Review    Plan of Care Reviewed With  patient  -JJ        Outcome Summary  OT evaluation completed. pt completed functional transfers from recliner chair with CGA and rollator. pt required cues for hand placement and safety. pt maintained static stadning balance with rollator x álvaro 45 seconds. anticipate pt to require mod assist for LB adls 2 to hinged knee brace on at all times. pt required encouragement for maximal effort and aprticipation with functional tasks. recommended pt have family bring in clothes to SNF, states understanding. pt would benefit from skilled OT services to address adl retraining, ae education and safety with functional transfers. OT will see pt 3 x week x 1 week with anticipated discharge home with assist from family  -JJ           Clinical Impression (OT)    Date of Referral to OT  01/03/20  -JJ        OT Diagnosis  decreased adl sp hospitalization and knee injury  -JJ        Prognosis (OT Eval)  good  -JJ        Patient/Family Goals Statement (OT  Eval)  pt states plan is to return home with family when discharged from facility  -        Criteria for Skilled Therapeutic Interventions Met (OT Eval)  yes;treatment indicated  -        Rehab Potential (OT Eval)  good, to achieve stated therapy goals  -        Therapy Frequency (OT Eval)  3 times/wk  -        Predicted Duration of Therapy Intervention (Therapy Eval)  x 1 week  -        Care Plan Review (OT)  evaluation/treatment results reviewed;care plan/treatment goals reviewed;risks/benefits reviewed  -        Anticipated Equipment Needs at Discharge (OT)  -- pt may benefit from long handled ae  -        Anticipated Discharge Disposition (OT)  home with assist;home with home health  -           Planned OT Interventions    Planned Therapy Interventions (OT Eval)  adaptive equipment training;BADL retraining;functional balance retraining;transfer/mobility retraining  -        Adaptive Equipment Training  x  -JJ        BADL Retraining  x  -J        Functional Balance Retraining  x  -JJ        Transfer/Mobility Retraining  x  -JJ           Transfer Goal 1 (OT)    Activity/Assistive Device (Transfer Goal 1, OT)  toilet;commode, 3-in-1;rollator  -JJ        [place] Level/Cues Needed (Transfer Goal 1, OT)  standby assist  -JJ        Time Frame (Transfer Goal 1, OT)  1 week  -JJ        Progress/Outcome (Transfer Goal 1, OT)  -- new goal  -JJ           Dressing Goal 1 (OT)    Activity/Assistive Device (Dressing Goal 1, OT)  lower body dressing with long handled ae if needed  -JJ        [place]/Cues Needed (Dressing Goal 1, OT)  contact guard assist  -JJ        Time Frame (Dressing Goal 1, OT)  1 week  -JJ        Progress/Outcome (Dressing Goal 1, OT)  -- new goal  -JJ           Balance Goal 1 (OT)    Activity/Assistive Device (Balance Goal 1, OT)  standing, static  -JJ        [place] Level/Cues Needed (Balance Goal 1, OT)  standby assist x 2-3 minutes to increase pt/caregiver I with adls   -JJ        Time Frame (Balance Goal 1, OT)  1 week  -JJ        Progress/Outcomes (Balance Goal 1, OT)  -- new goal  -JJ          User Key  (r) = Recorded By, (t) = Taken By, (c) = Cosigned By    Initials Name Effective Dates    Britta Calderon OTR 06/22/16 -                OT Recommendation and Plan  Outcome Summary/Treatment Plan (OT)  Anticipated Equipment Needs at Discharge (OT): (pt may benefit from long handled ae)  Anticipated Discharge Disposition (OT): home with assist, home with home health  Planned Therapy Interventions (OT Eval): adaptive equipment training, BADL retraining, functional balance retraining, transfer/mobility retraining  Therapy Frequency (OT Eval): 3 times/wk  Plan of Care Review  Plan of Care Reviewed With: patient  Plan of Care Reviewed With: patient  Outcome Summary: OT evaluation completed. pt completed functional transfers from recliner chair with CGA and rollator. pt required cues for hand placement and safety. pt maintained static stadning balance with rollator x álvaro 45 seconds. anticipate pt to require mod assist for LB adls 2 to hinged knee brace on at all times. pt required encouragement for maximal effort and aprticipation with functional tasks. recommended pt have family bring in clothes to SNF, states understanding. pt would benefit from skilled OT services to address adl retraining, ae education and safety with functional transfers. OT will see pt 3 x week x 1 week with anticipated discharge home with assist from family        Time Calculation:   Time Calculation- OT     Row Name 01/03/20 1514             Time Calculation- OT    OT Start Time  1423  -J      OT Stop Time  1439  -      OT Time Calculation (min)  16 min  -J      OT Non-Billable Time (min)  -- evaluation minutes only  -JJ        User Key  (r) = Recorded By, (t) = Taken By, (c) = Cosigned By    Initials Name Provider Type    Britta Calderon, OTR Occupational Therapist        Therapy Charges  for Today     Code Description Service Date Service Provider Modifiers Qty    79243235333 HC OT EVAL LOW COMPLEXITY 1 1/3/2020 Britta Crisostomo, [place] GO 1               Britta Crisostomo, O[place]  1/3/2020

## 2020-01-03 NOTE — THERAPY TREATMENT NOTE
Acute Care - Physical Therapy Treatment Note  WHITNEY Clayton     Patient Name: Destiny Catherine  : 1955  MRN: [id number]  Today's Date: 1/3/2020             Admit Date: 2019    Visit Dx:    ICD-10-CM ICD-9-CM   1. Urinary tract infection without hematuria, site unspecified N39.0 599.0   2. Confusion R41.0 298.9   3. Weakness R53.1 780.79   4. Closed nondisplaced fracture of condyle of right femur, initial encounter (CMS/[facility]) S72.414A 821.21   5. Chronic obstructive pulmonary disease, unspecified COPD type (CMS/[facility]) J44.9 496     Patient Active Problem List   Diagnosis   • Atrial fibrillation (CMS/[facility])   • Chronic diastolic heart failure (CMS/[facility])   • Hypertension   • Pulmonary hypertension (CMS/[facility])   • Nonruptured cerebral aneurysm   • Coronary artery disease involving native coronary artery of native heart without angina pectoris   • History of renal stent   • Tobacco abuse   • Epilepsy (CMS/[facility])   • History of colonic polyps   • History of abnormal cervical Pap smear   • HPV in female   • Acute on chronic respiratory failure with hypoxia (CMS/[facility])   • Chronic right-sided heart failure (CMS/[facility])   • Urinary tract infection without hematuria       Therapy Treatment    Rehabilitation Treatment Summary     Row Name 20 1038             Treatment Time/Intention    Discipline  physical therapist  -BP      Document Type  therapy note (daily note)  -BP      Subjective Information  no complaints  -BP      Mode of Treatment  physical therapy  -BP      Patient/Family Observations  Patient supine in bed with HOB elevated. Patient agreeable to PT   -BP      Care Plan Review  risks/benefits reviewed  -BP      Patient Effort  fair  -BP      Comment  Patient agreeable to minimal activity. Patient is unreceptive to any cues for improved safety. Patient agreeable to transfer to chair only, refuses further gait training.   -BP      Existing Precautions/Restrictions  fall;oxygen therapy device and L/min  -BP       Recorded by [BP] Nicolette Borjas, PT 01/03/20 1155      Row Name 01/03/20 1038             Cognitive Assessment/Intervention- PT/OT    Personal Safety Interventions  gait belt;nonskid shoes/slippers when out of bed  -BP      Recorded by [BP] Nicolette Borjas, PT 01/03/20 1155      Row Name 01/03/20 1038             Safety Issues, Functional Mobility    Safety Issues Affecting Function (Mobility)  insight into deficits/self awareness;positioning of assistive device;problem solving;judgment;impulsivity  -BP      Comment, Safety Issues/Impairments (Mobility)  Patient demonstrates poor safety awareness and is unreceptive to all cues to improve safety.   -BP      Recorded by [BP] Nicolette Borjas, PT 01/03/20 1155      Row Name 01/03/20 1038             Bed Mobility Assessment/Treatment    Bed Mobility Assessment/Treatment  supine-sit  -BP      Supine-Sit [place] (Bed Mobility)  minimum assist (75% patient effort);moderate assist (50% patient effort)  -BP      Bed Mobility, Safety Issues  decreased use of legs for bridging/pushing  -BP      Assistive Device (Bed Mobility)  bed rails;head of bed elevated  -BP      Comment (Bed Mobility)  Patient requires significant encouragement to perform with maximal independence.   -BP      Recorded by [BP] Nicolette Borjas, PT 01/03/20 1155      Row Name 01/03/20 1038             Transfer Assessment/Treatment    Transfer Assessment/Treatment  sit-stand transfer;stand-sit transfer  -BP      Comment (Transfers)  Patient requires cues for hand placement. She required CGA for sit to stand transfers. She impulsively transfers to sit prior to fully performing directional change to chair. She required mod/max A to safely transition to sit.   -BP      Recorded by [BP] Nicolette Borjas, PT 01/03/20 1155      Row Name 01/03/20 1038             Sit-Stand Transfer    Sit-Stand [place] (Transfers)  contact guard;verbal cues  -BP      Assistive Device (Sit-Stand Transfers)  walker,  front-wheeled  -BP      Recorded by [BP] Nicolette Borjas, PT 01/03/20 1155      Row Name 01/03/20 1038             Stand-Sit Transfer    Stand-Sit [place] (Transfers)  moderate assist (50% patient effort);maximum assist (25% patient effort);verbal cues  -BP      Assistive Device (Stand-Sit Transfers)  walker, front-wheeled  -BP      Recorded by [BP] Nicolette Borjas, PT 01/03/20 1155      Row Name 01/03/20 1038             Gait/Stairs Assessment/Training    Comment (Gait/Stairs)  Patient peforms gait x 4 feet to chair with CGA however requires significant cues to perform directional change to chair safely requring mod A to perform directional change. Patient refuses further gait training.   -BP      Recorded by [BP] Nicolette Borjas, PT 01/03/20 1155      Row Name 01/03/20 1038             Positioning and Restraints    Pre-Treatment Position  in bed  -BP      Post Treatment Position  chair  -BP      In Chair  reclined;call light within reach;encouraged to call for assist;with nsg with CNA   -BP      Recorded by [BP] Nicolette Borjas, PT 01/03/20 1328      Row Name 01/03/20 1038             Pain Scale: Numbers Pre/Post-Treatment    Pain Scale: Numbers, Pretreatment  0/10 - no pain  -BP      Pain Scale: Numbers, Post-Treatment  0/10 - no pain  -BP      Recorded by [BP] Nicolette Borjas, PT 01/03/20 1328      Row Name 01/03/20 1038             Plan of Care Review    Plan of Care Reviewed With  patient  -BP      Progress  no change  -BP      Outcome Summary  PT: Patient performs supine to sit transfer with min/mod A, sit to stand transfer from bed with CGA however requires mod/max A to safely transition to sit in chair due to poor safety awareness and being unreceptive to cues to improve transfer. She performs gait x 4 feet from bed to chair with CGA to max A with use of FWW. She requires max cues and mod/max A to safely perform directional change. She refuses further gait training or transfers. She continues  to require max encouragement for miminal participation and effort. Overall rehab potential continues to be poor due to patient motivation and willingness to participate  -BP      Recorded by [BP] Nicolette Borjas, PT 01/03/20 1331      Row Name 01/03/20 1038             Outcome Summary/Treatment Plan (PT)    Daily Summary of Progress (PT)  progress towards functional goals is fair  -BP      Barriers to Overall Progress (PT)  Patients participation and overall motivation to progress activity.   -BP      Recorded by [BP] [place] Nicolette, PT 01/03/20 1328        User Key  (r) = Recorded By, (t) = Taken By, (c) = Cosigned By    Initials Name Effective Dates Discipline    BP [place] Nicolette, PT 04/03/18 -  PT          Rash 12/26/19 1639 Left lower breast (Active)   Characteristics moist 1/3/2020  4:00 AM   Color red 1/3/2020  4:00 AM       Rash 12/26/19 1639 Right lower breast (Active)   Characteristics moist 1/3/2020  4:00 AM   Color red 1/3/2020  4:00 AM               PT Recommendation and Plan     Outcome Summary/Treatment Plan (PT)  Daily Summary of Progress (PT): progress towards functional goals is fair  Barriers to Overall Progress (PT): Patients participation and overall motivation to progress activity.   Plan of Care Reviewed With: patient  Progress: no change  Outcome Summary: PT: Patient performs supine to sit transfer with min/mod A, sit to stand transfer from bed with CGA however requires mod/max A to safely transition to sit in chair due to poor safety awareness and being unreceptive to cues to improve transfer. She performs gait x 4 feet from bed to chair with CGA to max A with use of FWW. She requires max cues and mod/max A to safely perform directional change. She refuses further gait training or transfers. She continues to require max encouragement for miminal participation and effort. Overall rehab potential continues to be poor due to patient motivation and willingness to participate  Outcome  Measures     Row Name 01/03/20 1038 01/02/20 0851 01/01/20 1125       How much help from another person do you currently need...    Turning from your back to your side while in flat bed without using bedrails?  3  -BP  3  -JW  3  -KM    Moving from lying on back to sitting on the side of a flat bed without bedrails?  2  -BP  2  -JW  3  -KM    Moving to and from a bed to a chair (including a wheelchair)?  2  -BP  2  -JW  3  -KM    Standing up from a chair using your arms (e.g., wheelchair, bedside chair)?  3  -BP  3  -JW  3  -KM    Climbing 3-5 steps with a railing?  2  -BP  2  -JW  2  -KM    To walk in hospital room?  2  -BP  3  -JW  3  -KM    AM-PAC 6 Clicks Score (PT)  14  -BP  15  -JW  17  -KM       Functional Assessment    Outcome Measure Options  AM-PAC 6 Clicks Basic Mobility (PT)  -BP  AM-PAC 6 Clicks Basic Mobility (PT)  -JW  --      User Key  (r) = Recorded By, (t) = Taken By, (c) = Cosigned By    Initials Name Provider Type    BP Nicolette Borjas, PT Physical Therapist    JW Adrianna Anton, PT Physical Therapist    KM Devika Porras, PTA Physical Therapy Assistant         Time Calculation:   PT Charges     Row Name 01/03/20 1332             Time Calculation    Start Time  1038  -BP      Stop Time  1048  -BP      Time Calculation (min)  10 min  -BP      PT Received On  01/03/20  -BP      PT - Next Appointment  01/04/20  -BP         Timed Charges    11817 - PT Therapeutic Exercise Minutes  10  -BP        User Key  (r) = Recorded By, (t) = Taken By, (c) = Cosigned By    Initials Name Provider Type    BP Nicolette Borjas, PT Physical Therapist        Therapy Charges for Today     Code Description Service Date Service Provider Modifiers Qty    86822814021 HC PT THER PROC EA 15 MIN 1/3/2020 Nicolette Borjas, PT GP 1          PT G-Codes  Outcome Measure Options: AM-PAC 6 Clicks Basic Mobility (PT)  AM-PAC 6 Clicks Score (PT): 14    Nicolette Borjas PT  1/3/2020

## 2020-01-03 NOTE — PLAN OF CARE
Problem: Chronic Obstructive Pulmonary Disease (Adult)  Goal: Signs and Symptoms of Listed Potential Problems Will be Absent, Minimized or Managed (Chronic Obstructive Pulmonary Disease)  Outcome: Ongoing (interventions implemented as appropriate)     Problem: Patient Care Overview  Goal: Individualization and Mutuality  Outcome: Ongoing (interventions implemented as appropriate)    Pt takes txs well. Has no resp complaints at this time

## 2020-01-03 NOTE — PLAN OF CARE
Problem: Patient Care Overview  Goal: Plan of Care Review  Flowsheets (Taken 1/3/2020 4124)  Outcome Summary: OT evaluation completed. pt completed functional transfers from recliner chair with CGA and rollator. pt required cues for hand placement and safety. pt maintained static stadning balance with rollator x álvaro 45 seconds. anticipate pt to require mod assist for LB adls 2 to hinged knee brace on at all times. pt required encouragement for maximal effort and aprticipation with functional tasks. recommended pt have family bring in clothes to SNF, states understanding. pt would benefit from skilled OT services to address adl retraining, ae education and safety with functional transfers. OT will see pt 3 x week x 1 week with anticipated discharge home with assist from family

## 2020-01-03 NOTE — PLAN OF CARE
Problem: Patient Care Overview  Goal: Plan of Care Review  Outcome: Ongoing (interventions implemented as appropriate)  Flowsheets  Taken 1/3/2020 1657  Progress: improving  Taken 1/3/2020 7369  Plan of Care Reviewed With: patient     Problem: Skin Injury Risk (Adult)  Goal: Identify Related Risk Factors and Signs and Symptoms  Outcome: Ongoing (interventions implemented as appropriate)  Flowsheets (Taken 1/3/2020 1657)  Related Risk Factors (Skin Injury Risk): body weight extremes; advanced age     Problem: Fall Risk (Adult)  Goal: Identify Related Risk Factors and Signs and Symptoms  Outcome: Ongoing (interventions implemented as appropriate)  Flowsheets (Taken 1/3/2020 1657)  Related Risk Factors (Fall Risk): age-related changes; fatigue/slow reaction; gait/mobility problems

## 2020-01-04 PROCEDURE — 99304 1ST NF CARE SF/LOW MDM 25: CPT | Performed by: INTERNAL MEDICINE

## 2020-01-04 PROCEDURE — 97161 PT EVAL LOW COMPLEX 20 MIN: CPT

## 2020-01-04 RX ADMIN — PANTOPRAZOLE SODIUM 40 MG: 40 TABLET, DELAYED RELEASE ORAL at 06:09

## 2020-01-04 RX ADMIN — ROSUVASTATIN CALCIUM 40 MG: 5 TABLET, FILM COATED ORAL at 21:33

## 2020-01-04 RX ADMIN — FUROSEMIDE 20 MG: 40 TABLET ORAL at 21:34

## 2020-01-04 RX ADMIN — METOPROLOL SUCCINATE 100 MG: 50 TABLET, EXTENDED RELEASE ORAL at 10:00

## 2020-01-04 RX ADMIN — MICONAZOLE NITRATE: 20 POWDER TOPICAL at 10:01

## 2020-01-04 RX ADMIN — IPRATROPIUM BROMIDE AND ALBUTEROL SULFATE 3 ML: .5; 3 SOLUTION RESPIRATORY (INHALATION) at 21:36

## 2020-01-04 RX ADMIN — CARBAMAZEPINE 200 MG: 200 TABLET ORAL at 21:33

## 2020-01-04 RX ADMIN — TICAGRELOR 90 MG: 90 TABLET ORAL at 21:35

## 2020-01-04 RX ADMIN — FUROSEMIDE 60 MG: 40 TABLET ORAL at 10:01

## 2020-01-04 RX ADMIN — TICAGRELOR 90 MG: 90 TABLET ORAL at 14:51

## 2020-01-04 RX ADMIN — CARBAMAZEPINE 200 MG: 200 TABLET ORAL at 10:00

## 2020-01-04 RX ADMIN — MICONAZOLE NITRATE: 20 CREAM TOPICAL at 09:58

## 2020-01-04 RX ADMIN — PRIMIDONE 250 MG: 250 TABLET ORAL at 10:00

## 2020-01-04 RX ADMIN — MICONAZOLE NITRATE: 20 CREAM TOPICAL at 21:37

## 2020-01-04 RX ADMIN — MICONAZOLE NITRATE: 20 POWDER TOPICAL at 21:37

## 2020-01-04 NOTE — PLAN OF CARE
Problem: Patient Care Overview  Goal: Plan of Care Review  Outcome: Ongoing (interventions implemented as appropriate)  Flowsheets  Taken 1/4/2020 1436  Progress: improving  Taken 1/4/2020 1000  Plan of Care Reviewed With: patient  Note:   Encourage participation with ADLs     Problem: Skin Injury Risk (Adult)  Goal: Identify Related Risk Factors and Signs and Symptoms  Outcome: Ongoing (interventions implemented as appropriate)     Problem: Fall Risk (Adult)  Goal: Identify Related Risk Factors and Signs and Symptoms  Outcome: Ongoing (interventions implemented as appropriate)

## 2020-01-04 NOTE — PLAN OF CARE
Problem: Patient Care Overview  Goal: Plan of Care Review  Flowsheets  Taken 1/4/2020 1048  Progress: improving  Taken 1/4/2020 0840  Plan of Care Reviewed With: patient  Outcome Summary: PT evaluation completed this am. Patient able to transfer supine to sit with minimal assist of 1 primarily for right leg only. She was able to transfer sit to stand with FWW with CGA of 1 with long leg hinged knee brace right leg. She ambulated 6 feet from bed to recliner chair with FWW, with CGA- minimal assist of 2, WBAT right with right long leg hinged knee brace on.  Encouraged patient to try and ambulate increased distance but patient refused.  Encouraged her to do AP throughtout the day.  Patient will benefit from skilled PT 2 x day (as long as she participates) 6 x week for 1 week for functional mobility and gait training with FWW/rollator and therapeutic exercise as tolerated; will begin knee ROM as MD orders. Plan is for patient to return home with spouse and HH PT.

## 2020-01-04 NOTE — PLAN OF CARE
Problem: Patient Care Overview  Goal: Plan of Care Review  Outcome: Ongoing (interventions implemented as appropriate)  Goal: Individualization and Mutuality  Outcome: Ongoing (interventions implemented as appropriate)  Goal: Discharge Needs Assessment  Outcome: Ongoing (interventions implemented as appropriate)  Goal: Interprofessional Rounds/Family Conf  Outcome: Ongoing (interventions implemented as appropriate)     Problem: Skin Injury Risk (Adult)  Goal: Identify Related Risk Factors and Signs and Symptoms  Outcome: Ongoing (interventions implemented as appropriate)  Goal: Skin Health and Integrity  Outcome: Ongoing (interventions implemented as appropriate)     Problem: Fall Risk (Adult)  Goal: Identify Related Risk Factors and Signs and Symptoms  Outcome: Ongoing (interventions implemented as appropriate)  Goal: Absence of Fall  Outcome: Ongoing (interventions implemented as appropriate)

## 2020-01-04 NOTE — THERAPY EVALUATION
SNF - Physical Therapy Initial Evaluation   Pat Jung     Patient Name: Destiny Catherine  : 1955  MRN: [id number]  Today's Date: 2020   Onset of Illness/Injury or Date of Surgery: 19  Date of Referral to PT: 20  Referring Physician: Sandy      Admit Date: 1/3/2020    Visit Dx: No diagnosis found.  Patient Active Problem List   Diagnosis   • Atrial fibrillation (CMS/HCC)   • Chronic diastolic heart failure (CMS/HCC)   • Hypertension   • Pulmonary hypertension (CMS/HCC)   • Nonruptured cerebral aneurysm   • Coronary artery disease involving native coronary artery of native heart without angina pectoris   • History of renal stent   • Tobacco abuse   • Epilepsy (CMS/HCC)   • History of colonic polyps   • History of abnormal cervical Pap smear   • HPV in female   • Acute on chronic respiratory failure with hypoxia (CMS/HCC)   • Chronic right-sided heart failure (CMS/HCC)   • Urinary tract infection without hematuria     Past Medical History:   Diagnosis Date   • Anemia    • Chronic diastolic heart failure (CMS/HCC) 2017   • Colon polyp    • COPD (chronic obstructive pulmonary disease) (CMS/HCC)    • Coronary artery disease involving native coronary artery of native heart without angina pectoris 2017   • Epilepsy (CMS/HCC)    • History of abnormal cervical Pap smear 2019   • History of renal stent 2017   • HPV in female 2019   • Hyperlipidemia    • Hypertension    • Malignant neoplasm of anus (CMS/HCC) 2018   • Nonruptured cerebral aneurysm    • Permanent atrial fibrillation    • Pulmonary hypertension (CMS/HCC)    • Stroke (CMS/HCC)      and 2010- [facility]   • Tobacco abuse    • UTI (urinary tract infection)      Past Surgical History:   Procedure Laterality Date   • APPENDECTOMY     • CEREBRAL ANGIOGRAM      [facility]ent [facility] at [facility]   • CHOLECYSTECTOMY     • COLONOSCOPY N/A 2019    Procedure: COLONOSCOPY; POLYPECTOMY;  Surgeon: Deborah Cedeno MD;   Location: [facility];  Service: Gastroenterology   • CORONARY STENT PLACEMENT     • TOTAL HIP ARTHROPLASTY          PT ASSESSMENT (last 12 hours)      Physical Therapy Evaluation     Row Name 01/04/20 0840          PT Evaluation Time/Intention    Subjective Information  complains of;fatigue  -LN     Document Type  evaluation  -LN     Mode of Treatment  physical therapy  -LN     Patient Effort  fair  -LN     Comment  Patient agreeable to get out of bed and walk to chair this am.  -LN     Row Name 01/04/20 0840          General Information    Patient Profile Reviewed?  yes  -LN     Onset of Illness/Injury or Date of Surgery  12/26/19  -LN     Referring Physician  Sandy  -LN     Patient Observations  agree to therapy;alert  -LN     Patient/Family Observations  Patient resting in bed; long leg brace locked in extension right leg. Patient agreeable to PT this am.  -LN     Equipment Currently Used at Home  rollator;oxygen  -LN     Pertinent History of Current Functional Problem  Patient admitted to hospital after suffering mechanical fall at home. pt diagnosed with UTI and R medial femoral condyle fracture. per ortho pt is wbat with hinged knee brace, locked in extension in place at all times; no knee ROM for 10-14 days.  Patient states uses rollator for all household mobility but only ambulates short distances at baseline to bathroom and back.  -LN     Existing Precautions/Restrictions  fall;oxygen therapy device and L/min 2L O2 nasal cannula  -LN     Limitations/Impairments  safety/cognitive  -LN     Risks Reviewed  patient:;LOB;increased discomfort  -LN     Benefits Reviewed  patient:;improve function;increase independence;increase strength;increase balance;decrease risk of DVT;increase knowledge  -LN     Barriers to Rehab  previous functional deficit  -LN     Row Name 01/04/20 0840          Relationship/Environment    Primary Source of Support/Comfort  spouse  -LN     Lives With  spouse  -LN     Family Caregiver  if Needed  spouse  -LN     Row Name 01/04/20 0840          Resource/Environmental Concerns    Current Living Arrangements  home/apartment/condo  -LN     Row Name 01/04/20 0840          Living Environment    Living Arrangements  house  -LN     Home Accessibility  stairs to enter home  -LN     Row Name 01/04/20 0840          Home Main Entrance    Number of Stairs, Main Entrance  one  -LN     Stair Railings, Main Entrance  none  -LN     Stairs Comment, Main Entrance  Patient reports that she holds onto her  when going up and down the 1 step; no steps inside home.  -LN     Row Name 01/04/20 0840          Stairs Within Home, Primary    Number of Stairs, Within Home, Primary  none  -LN     Row Name 01/04/20 0840          Cognitive Assessment/Interventions    Additional Documentation  Cognitive Assessment/Intervention (Group)  -     Row Name 01/04/20 0840          Cognitive Assessment/Intervention- PT/OT    Orientation Status (Cognition)  oriented x 3  -LN     Follows Commands (Cognition)  WFL;verbal cues/prompting required;repetition of directions required  -LN     Personal Safety Interventions  gait belt;nonskid shoes/slippers when out of bed;supervised activity  -     Row Name 01/04/20 0840          Safety Issues, Functional Mobility    Safety Issues Affecting Function (Mobility)  insight into deficits/self awareness;judgment;positioning of assistive device;safety precaution awareness;impulsivity  -LN     Comment, Safety Issues/Impairments (Mobility)  Patient needs cueing for proper and safe positioning of FWW; she tried to leave walker and reach for chair before she was even close enough to chair.   -     Row Name 01/04/20 0840          Mobility Assessment/Treatment    Extremity Weight-bearing Status  right lower extremity  -LN     Right Lower Extremity (Weight-bearing Status)  weight-bearing as tolerated (WBAT) with hinged knee brace (locked in extension) on at all times  -     Row Name 01/04/20 0840     "      Bed Mobility Assessment/Treatment    Bed Mobility Assessment/Treatment  supine-sit  -LN     Supine-Sit [place] (Bed Mobility)  minimum assist (75% patient effort);1 person assist;verbal cues;nonverbal cues (demo/gesture) assist primarily needed for right leg  -LN     Bed Mobility, Safety Issues  decreased use of legs for bridging/pushing  -LN     Assistive Device (Bed Mobility)  bed rails;head of bed elevated  -LN     Row Name 01/04/20 0840          Transfer Assessment/Treatment    Transfer Assessment/Treatment  sit-stand transfer;stand-sit transfer  -LN     Maintains Weight-bearing Status (Transfers)  able to maintain  -LN     Comment (Transfers)  Patient required cueing for proper hand placement and positioning of FWW.  -LN     Sit-Stand [place] (Transfers)  contact guard;1 person assist;verbal cues;nonverbal cues (demo/gesture)  -LN     Stand-Sit [place] (Transfers)  contact guard;1 person assist;verbal cues;nonverbal cues (demo/gesture)  -LN     Row Name 01/04/20 0840          Sit-Stand Transfer    Assistive Device (Sit-Stand Transfers)  walker, front-wheeled  -LN     Row Name 01/04/20 0840          Stand-Sit Transfer    Assistive Device (Stand-Sit Transfers)  walker, front-wheeled  -LN     Row Name 01/04/20 0840          Gait/Stairs Assessment/Training    [place] Level (Gait)  contact guard;minimum assist (75% patient effort);2 person assist Hinged knee brace locked in extension right.-LN     Assistive Device (Gait)  walker, front-wheeled  -LN     Distance in Feet (Gait)  6 feet to chair; encouraged patient to walk further. but patient stated- \"I am only walking to the chair and no further.\"   -LN     Pattern (Gait)  swing-to  -LN     Deviations/Abnormal Patterns (Gait)  gait speed decreased;stride length decreased  -LN     Bilateral Gait Deviations  forward flexed posture  -LN     Comment (Gait/Stairs)  Patient needed cueing for proper postioning of FWW and to keep it with her the " entire time until she backs up and feels the chair on the back of her legs and to then reach back for chair.  Patient refused to ambulate any further than to the chair even with encouragement.   -LN     Row Name 01/04/20 0840          General ROM    GENERAL ROM COMMENTS  Left LE WFL grossly; Right LE NT except ankle is WFL.   -LN     Row Name 01/04/20 0840          MMT (Manual Muscle Testing)    General MMT Comments  Left LE 4-/5; right LE NT; in long leg hinged knee brace.  -LN     Row Name 01/04/20 0840          Pain Scale: Numbers Pre/Post-Treatment    Pain Scale: Numbers, Pretreatment  0/10 - no pain  -LN     Pain Scale: Numbers, Post-Treatment  0/10 - no pain  -LN     Pre/Post Treatment Pain Comment  no c/o any pain this am.  -LN     Row Name 01/04/20 0840          Plan of Care Review    Plan of Care Reviewed With  patient  -LN     Progress  improving  -LN     Outcome Summary  PT evaluation completed this am. Patient able to transfer supine to sit with minimal assist of 1 primarily for right leg only. She was able to transfer sit to stand with FWW with CGA of 1 with long leg hinged knee brace right leg. She ambulated 6 feet from bed to recliner chair with FWW, with CGA- minimal assist of 2, WBAT right with right long leg hinged knee brace on.  Encouraged patient to try and ambulate increased distance but patient refused.  Encouraged her to do AP throughtout the day.  Patient will benefit from skilled PT 2 x day (as long as she participates) 6 x week for 1 week for functional mobility and gait training with FWW/rollator and therapeutic exercise as tolerated; will begin knee ROM as MD orders. Plan is for patient to return home with spouse and HH PT.   -LN     Row Name 01/04/20 0840          Physical Therapy Clinical Impression    Date of Referral to PT  01/03/20  -LN     PT Diagnosis (PT Clinical Impression)  Rigth medial femoral condyle fracture s/p fall.  -LN     Criteria for Skilled Interventions Met (PT  Clinical Impression)  yes  -LN     Rehab Potential (PT Clinical Summary)  good, to achieve stated therapy goals  -LN     Predicted Duration of Therapy (PT)  1 week  -LN     Care Plan Review (PT)  evaluation/treatment results reviewed;care plan/treatment goals reviewed;risks/benefits reviewed;current/potential barriers reviewed;patient/other agree to care plan  -LN     Row Name 01/04/20 0840          Physical Therapy Goals    Bed Mobility Goal Selection (PT)  bed mobility, PT goal 1  -LN     Transfer Goal Selection (PT)  transfer, PT goal 1  -LN     Gait Training Goal Selection (PT)  gait training, PT goal 1  -LN     Stairs Goal Selection (PT)  stairs, PT goal 1  -LN     Row Name 01/04/20 0840          Bed Mobility Goal 1 (PT)    Activity/Assistive Device (Bed Mobility Goal 1, PT)  sit to supine;supine to sit  -LN     [place] Level/Cues Needed (Bed Mobility Goal 1, PT)  independent  -LN     Time Frame (Bed Mobility Goal 1, PT)  1 week  -LN     Progress/Outcomes (Bed Mobility Goal 1, PT)  goal ongoing  -LN     Row Name 01/04/20 0840          Transfer Goal 1 (PT)    Activity/Assistive Device (Transfer Goal 1, PT)  sit-to-stand/stand-to-sit  -LN     [place] Level/Cues Needed (Transfer Goal 1, PT)  supervision required  -LN     Time Frame (Transfer Goal 1, PT)  1 week  -LN     Progress/Outcome (Transfer Goal 1, PT)  goal ongoing  -LN     Row Name 01/04/20 0840          Gait Training Goal 1 (PT)    Activity/Assistive Device (Gait Training Goal 1, PT)  gait (walking locomotion);assistive device use;walker, rolling; hinged knee brace right leg  -LN     [place] Level (Gait Training Goal 1, PT)  contact guard assist;standby assist;1 person assist  -LN     Distance (Gait Goal 1, PT)  20 feet  -LN     Time Frame (Gait Training Goal 1, PT)  1 week  -LN     Progress/Outcome (Gait Training Goal 1, PT)  goal ongoing  -LN     Row Name 01/04/20 0840          Stairs Goal 1 (PT)    Activity/Assistive Device (Stairs Goal  1, PT)  stairs, all skills  -LN     [place] Level/Cues Needed (Stairs Goal 1, PT)  minimum assist (75% or more patient effort);1 person assist  -LN     Number of Stairs (Stairs Goal 1, PT)  1  -LN     Time Frame (Stairs Goal 1, PT)  1 week  -LN     Row Name 01/04/20 0840          Patient Education Goal (PT)    Activity (Patient Education Goal, PT)  Patient able to perform 10 reps LE exercises. as appropriate; no ROM of right knee until MD velazquez  -LN     [place]/Cues/Accuracy (Memory Goal 2, PT)  demonstrates adequately;verbalizes understanding  -LN     Time Frame (Patient Education Goal, PT)  1 week  -LN     Progress/Outcome (Patient Education Goal, PT)  goal ongoing  -LN     Row Name 01/04/20 0840          Positioning and Restraints    Pre-Treatment Position  in bed  -LN     Post Treatment Position  chair  -LN     In Chair  notified nsg;reclined;call light within reach;encouraged to call for assist;legs elevated;RLE elevated pillow under right leg  -LN       User Key  (r) = Recorded By, (t) = Taken By, (c) = Cosigned By    Initials Name Provider Type    Corry Montgomery, PT Physical Therapist          PT Recommendation and Plan  Anticipated Discharge Disposition (PT): home with assist, home with home health  Therapy Frequency (PT Clinical Impression): 2 times/day(as long as she participates)  Outcome Summary/Treatment Plan (PT)  Anticipated Equipment Needs at Discharge (PT): (she has a rollator)  Anticipated Discharge Disposition (PT): home with assist, home with home health  Plan of Care Reviewed With: patient  Progress: improving  Outcome Summary: PT evaluation completed this am. Patient able to transfer supine to sit with minimal assist of 1 primarily for right leg only. She was able to transfer sit to stand with FWW with CGA of 1 with long leg hinged knee brace right leg. She ambulated 6 feet from bed to recliner chair with FWW, with CGA- minimal assist of 2, WBAT right with right long leg  hinged knee brace on.  Encouraged patient to try and ambulate increased distance but patient refused.  Encouraged her to do AP throughtout the day.  Patient will benefit from skilled PT 2 x day (as long as she participates) 6 x week for 1 week for functional mobility and gait training with FWW/rollator and therapeutic exercise as tolerated; will begin knee ROM as MD orders. Plan is for patient to return home with spouse and HH PT.      Time Calculation:   PT Charges     Row Name 01/04/20 1049             Time Calculation    Start Time  0845  -LN      Stop Time  0903  -LN      Time Calculation (min)  18 min  -LN      PT Non-Billable Time (min)  15 min Initial evaluation  -LN         Time Calculation- PT    TCU Minutes- PT  3 min  -LN        User Key  (r) = Recorded By, (t) = Taken By, (c) = Cosigned By    Initials Name Provider Type    LN Corry Sidhu, PT Physical Therapist        Therapy Charges for Today     Code Description Service Date Service Provider Modifiers Qty    13727099049 HC PT EVAL LOW COMPLEXITY 1 1/4/2020 Corry Sidhu, PT GP 1          PT G-Codes  AM-PAC 6 Clicks Score (PT): 14      Corry Sidhu, PT  1/4/2020

## 2020-01-05 RX ADMIN — IPRATROPIUM BROMIDE AND ALBUTEROL SULFATE 3 ML: .5; 3 SOLUTION RESPIRATORY (INHALATION) at 20:33

## 2020-01-05 RX ADMIN — CARBAMAZEPINE 200 MG: 200 TABLET ORAL at 20:33

## 2020-01-05 RX ADMIN — ALBUTEROL SULFATE 2.5 MG: 2.5 SOLUTION RESPIRATORY (INHALATION) at 16:44

## 2020-01-05 RX ADMIN — MICONAZOLE NITRATE: 20 POWDER TOPICAL at 08:54

## 2020-01-05 RX ADMIN — TICAGRELOR 90 MG: 90 TABLET ORAL at 20:34

## 2020-01-05 RX ADMIN — MICONAZOLE NITRATE: 20 POWDER TOPICAL at 20:35

## 2020-01-05 RX ADMIN — FUROSEMIDE 20 MG: 40 TABLET ORAL at 20:34

## 2020-01-05 RX ADMIN — FUROSEMIDE 60 MG: 40 TABLET ORAL at 08:50

## 2020-01-05 RX ADMIN — PANTOPRAZOLE SODIUM 40 MG: 40 TABLET, DELAYED RELEASE ORAL at 06:45

## 2020-01-05 RX ADMIN — MICONAZOLE NITRATE: 20 CREAM TOPICAL at 08:53

## 2020-01-05 RX ADMIN — CARBAMAZEPINE 200 MG: 200 TABLET ORAL at 08:51

## 2020-01-05 RX ADMIN — TICAGRELOR 90 MG: 90 TABLET ORAL at 08:52

## 2020-01-05 RX ADMIN — MICONAZOLE NITRATE: 20 CREAM TOPICAL at 20:35

## 2020-01-05 RX ADMIN — PRIMIDONE 250 MG: 250 TABLET ORAL at 08:50

## 2020-01-05 RX ADMIN — METOPROLOL SUCCINATE 100 MG: 50 TABLET, EXTENDED RELEASE ORAL at 08:50

## 2020-01-05 NOTE — H&P
Hospitalist Team      Patient Care Team:  Becca Unger MD as PCP - General  Becca Unger MD as PCP - Family Medicine  .PCP    CHIEF COMPLAINT:   presentation for rehabilitation     HISTORY OF PRESENT ILLNESS:    64-year-old female who presented to hospital 12/26/2019 following mechanical fall after tripping in the bathroom, sustained right femoral condyle fracture with associated joint effusion and hemarthrosis.  Patient was seen by Dr. Ryenoso with orthopedic surgery and the decision was made for conservative management.  Patient is placed in long-leg hinged knee brace for stabilization and is allowed for weightbearing as tolerated.  Patient now presents for inpatient rehabilitation.  At this time she has no acute complaints or concerns.  Denies chest pain shortness of breath or palpitations, no cough fever chills or nausea vomiting.  Having regular bowel movements.  Pain in right lower extremity is controlled. Was out of bed today.  Tolerating diet.  Working with physical therapy.      Past Medical History:   Diagnosis Date   • Anemia    • Chronic diastolic heart failure (CMS/HCC) 4/27/2017   • Colon polyp    • COPD (chronic obstructive pulmonary disease) (CMS/HCC)    • Coronary artery disease involving native coronary artery of native heart without angina pectoris 4/27/2017   • Epilepsy (CMS/HCC)    • History of abnormal cervical Pap smear 4/30/2019   • History of renal stent 4/27/2017   • HPV in female 5/6/2019   • Hyperlipidemia    • Hypertension    • Malignant neoplasm of anus (CMS/HCC) 4/11/2018   • Nonruptured cerebral aneurysm    • Permanent atrial fibrillation    • Pulmonary hypertension (CMS/HCC)    • Stroke (CMS/HCC)     2009 and January 2010- [facility]   • Tobacco abuse    • UTI (urinary tract infection)      Past Surgical History:   Procedure Laterality Date   • APPENDECTOMY     • CEREBRAL ANGIOGRAM      stent placement at [facility]   • CHOLECYSTECTOMY     • COLONOSCOPY N/A 5/8/2019    Procedure:  COLONOSCOPY; POLYPECTOMY;  Surgeon: Deborah Cedeno MD;  Location: [facility];  Service: Gastroenterology   • CORONARY STENT PLACEMENT     • TOTAL HIP ARTHROPLASTY       Family History   Problem Relation Age of Onset   • Hypertension Mother    • Lung cancer Mother    • Heart disease Father    • Stroke Father    • Colon cancer Neg Hx    • Colon polyps Neg Hx      Social History     Tobacco Use   • Smoking status: Former Smoker     Types: Cigarettes   • Smokeless tobacco: Never Used   • Tobacco comment: caffine use   Substance Use Topics   • Alcohol use: No   • Drug use: No     Medications Prior to Admission   Medication Sig Dispense Refill Last Dose   • carBAMazepine (TEGretol) 200 MG tablet Take 200 mg by mouth 2 (Two) Times a Day.   1/3/2020 at Unknown time   • furosemide (LASIX) 20 MG tablet 60 mg a.m. and 20 mg p.m. 120 tablet 1 1/3/2020 at Unknown time   • metoprolol succinate XL (TOPROL-XL) 100 MG 24 hr tablet Take 100 mg by mouth Daily.   1/3/2020 at Unknown time   • miconazole (MICOTIN) 2 % cream Apply  topically to the appropriate area as directed Every 12 (Twelve) Hours. 92 g 1 1/3/2020 at Unknown time   • primidone (MYSOLINE) 250 MG tablet Take 250 mg by mouth Daily.   1/3/2020 at Unknown time   • rosuvastatin (CRESTOR) 40 MG tablet Take 40 mg by mouth Every Night.   1/3/2020 at Unknown time   • esomeprazole (nexIUM) 40 MG capsule Take 40 mg by mouth Every Morning Before Breakfast.   Unknown at Unknown time   • ipratropium-albuterol (DUO-NEB) 0.5-2.5 mg/3 ml nebulizer Take 3 mL by nebulization 4 (Four) Times a Day. 360 mL 1 Unknown at Unknown time   • nitroglycerin (NITROSTAT) 0.4 MG SL tablet PLEASE SEE ATTACHED FOR DETAILED DIRECTIONS  1 Unknown at Unknown time   • ticagrelor (BRILINTA) 90 MG tablet tablet Take 90 mg by mouth 2 (Two) Times a Day.   Unknown at Unknown time     Allergies:  Plavix [clopidogrel bisulfate] and Keflex [cephalexin]    REVIEW OF SYSTEMS:      Review of Systems   Constitutional:  "Negative for activity change, appetite change and fever.   Eyes: Negative for discharge and visual disturbance.   Respiratory: Negative for chest tightness and shortness of breath.    Cardiovascular: Negative for chest pain and leg swelling.   Gastrointestinal: Negative for abdominal pain, constipation and nausea.   Genitourinary: Negative for difficulty urinating and dysuria.   Musculoskeletal: Positive for arthralgias and gait problem.   Skin: Negative for rash and wound.   Neurological: Negative for dizziness and weakness.   Psychiatric/Behavioral: Negative for agitation and behavioral problems.       Vital Signs  Temp:  [97.1 °F (36.2 °C)-97.9 °F (36.6 °C)] 97.1 °F (36.2 °C)  Heart Rate:  [78-91] 78  Resp:  [18-20] 18  BP: (113-121)/(71-82) 113/71  Oxygen Therapy  SpO2: 94 %  Pulse Oximetry Type: Intermittent  Device (Oxygen Therapy): nasal cannula  Flow (L/min): 2]  Flowsheet Rows      First Filed Value   Admission Height  170.2 cm (67.01\") Documented at 01/03/2020 1407   Admission Weight  82.6 kg (182 lb) Documented at 01/03/2020 1407           Physical Exam:  Physical Exam   Constitutional: Patient appears well-developed and well-nourished and in no acute distress   HEENT:   Head: Normocephalic and atraumatic.   Eyes:  Pupils are equal, round, and reactive to light. EOM are intact. Sclera are anicteric and non-injected.  Mouth and Throat: Patient has moist mucous membranes. Oropharynx is clear of any erythema or exudate.     Neck: Neck supple. No JVD present. No thyromegaly present. No lymphadenopathy present.  Cardiovascular: Regular rate, regular rhythm, S1 normal and S2 normal.  Exam reveals no gallop and no friction rub.  No murmur heard.  Pulmonary/Chest: Lungs are clear to auscultation bilaterally. No respiratory distress. No wheezes. No rhonchi. No rales. On 1 L NC.  Abdominal: Soft. Bowel sounds are normal. No distension and no mass. There is no hepatosplenomegaly. There is no tenderness. "   Musculoskeletal: Normal Muscle tone, brace in place to RLE  Extremities: No edema. Pulses are palpable in all 4 extremities.  Neurological: Patient is alert and oriented to person, place, and time. Cranial nerves II-XII are grossly intact with no focal deficits.  Skin: Skin is warm. No rash noted. Nails show no clubbing.  No cyanosis or erythema.    Emotional Behavior:    Judgement and Insight: Intact   Mental Status:  Alertness oriented   Memory: Intact   Mood and Affect:         Depression denies               Anxiety denies    Debilities:   Physical Weakness weightbearing as tolerated to right lower extremity, limited by pain   Handicaps baseline respiratory distress and limited weightbearing right lower extremity   Disabilities denies   Agitation denies     Results Review:    I reviewed the patient's new clinical results.  Lab Results (most recent)     None          Imaging Results (Most Recent)     None        not reviewed    ECG/EMG Results (most recent)     None        not reviewed    Assessment/Plan       * No active hospital problems. *      -Presentation for rehabilitation following Right Femoral Condyle Fracture with Large Effusion/Hemarthrosis:   Currently pain controlled on oral [place].  Dr. Reynoso with orthopedic surgery saw the patient here and currently this is being treated conservatively with a long leg hinged knee brace for stabilization and patient is weightbearing as tolerated with brace locked in extension. Patient is not participating well with PT/OT, will continue to encourage active participation.  Patient will need to be seen back in Dr. Reynoso's office in 1 to 2 weeks.      -Chronic Hyponatremia:   Appears chronic in this patient with low sodium levels dating back to 2015 and beyond.  The patient's serum osmolality is very slightly low.  She appears euvolemic on examination.  Urine osm would appear c/w SIADH by Urine sodium is usually > 20mEq/L and hers is less than. I suspect this may be  secondary to the patient's carbamazepine (in this case the mechanism is usually SIADH) +/- lasix. Monitor intermittently.     -Chronic A-fib:   Heart rate is at goal.  Patient is on her home dose of Toprol-XL.  She follows with Dr. Musa as an outpatient, not a candidate for anticoagulation.      -Chronic dCHF:  No acute issues here.  On home dose of Lasix.  Appears euvolemic on examination.     -Chronic Hypoxic Resp Failure:   -Probable COPD:  -Pulmonary HTN:   -Abnormal chest CT:  Pulmonary followed the patient here.  Echo done earlier this month shows severe pulmonary hypertension and pulmonary notes the patient will likely need a right heart cath at some point.  Chest CT concerning for ILD, serologies pending.  Patient needs to follow-up in the pulmonary office in 4 to 6 weeks status post discharge.  She will require outpatient PFTs and eventually a repeat CT scan, timing will be determined on an outpatient basis.  Continue current oxygen at 1 L nasal cannula.  Continue duo nebs 4 times daily.     -CKD stage III:   Patient's renal function is at her baseline.     -CAD with history of MI and prior stent placement:  -History of CVA:  No acute issues here currently.  Patient continued on her home dose of Crestor. Brilinta on hold here secondary to hemarthrosis but I spoke with Dr. Reynoso and he is ok to resume. Will monitor CBC intermittently.     -Chronic normocytic anemia:  Baseline hemoglobin appears 9-10 as far back as March 2018.  Has no signs or symptoms of overt bleeding other than the hemarthrosis noted related to her fracture.  Hemoglobin here has been fairly stable in her normal range.     I discussed the patients findings and my recommendations with patient.     Luca Britton DO  01/04/20  8:14 PM    Time: 20 minutes

## 2020-01-05 NOTE — SIGNIFICANT NOTE
Pt declines PT at this time after much encouragement. States she refuses to do any activity on a Sunday. Instructed pt to sit up in the chair as much as she can throughout the day and PT will be back tomorrow.    01/05/20 0959   Rehab Treatment   Discipline physical therapy assistant   Reason Treatment Not Performed patient/family declined treatment   Recommendation   PT - Next Appointment 01/06/20

## 2020-01-05 NOTE — NURSING NOTE
Case Management Discharge Note      Final Note: dc [facility]    Provided post acute provider list?: Refused    Destination      No service has been selected for the patient.      Durable Medical Equipment      No service has been selected for the patient.      Dialysis/Infusion      No service has been selected for the patient.      Home Medical Care      No service has been selected for the patient.      Therapy      No service has been selected for the patient.      Community Resources      No service has been selected for the patient.             Final Discharge Disposition Code: 03 - skilled nursing facility (SNF)

## 2020-01-05 NOTE — PLAN OF CARE
Problem: Patient Care Overview  Goal: Plan of Care Review  Outcome: Ongoing (interventions implemented as appropriate)  Flowsheets  Taken 1/5/2020 1804  Progress: improving  Taken 1/5/2020 0900  Plan of Care Reviewed With: patient     Problem: Skin Injury Risk (Adult)  Goal: Identify Related Risk Factors and Signs and Symptoms  Outcome: Ongoing (interventions implemented as appropriate)     Problem: Fall Risk (Adult)  Goal: Identify Related Risk Factors and Signs and Symptoms  Outcome: Ongoing (interventions implemented as appropriate)

## 2020-01-06 PROCEDURE — 94799 UNLISTED PULMONARY SVC/PX: CPT

## 2020-01-06 PROCEDURE — 97116 GAIT TRAINING THERAPY: CPT

## 2020-01-06 RX ADMIN — TICAGRELOR 90 MG: 90 TABLET ORAL at 20:27

## 2020-01-06 RX ADMIN — PANTOPRAZOLE SODIUM 40 MG: 40 TABLET, DELAYED RELEASE ORAL at 09:01

## 2020-01-06 RX ADMIN — ROSUVASTATIN CALCIUM 40 MG: 5 TABLET, FILM COATED ORAL at 20:27

## 2020-01-06 RX ADMIN — MICONAZOLE NITRATE: 20 POWDER TOPICAL at 09:02

## 2020-01-06 RX ADMIN — MICONAZOLE NITRATE: 20 POWDER TOPICAL at 20:29

## 2020-01-06 RX ADMIN — MICONAZOLE NITRATE: 20 CREAM TOPICAL at 09:01

## 2020-01-06 RX ADMIN — CARBAMAZEPINE 200 MG: 200 TABLET ORAL at 20:27

## 2020-01-06 RX ADMIN — HYDROCODONE BITARTRATE AND ACETAMINOPHEN 1 TABLET: 5; 325 TABLET ORAL at 00:13

## 2020-01-06 RX ADMIN — PRIMIDONE 250 MG: 250 TABLET ORAL at 09:01

## 2020-01-06 RX ADMIN — IPRATROPIUM BROMIDE AND ALBUTEROL SULFATE 3 ML: .5; 3 SOLUTION RESPIRATORY (INHALATION) at 20:31

## 2020-01-06 RX ADMIN — FUROSEMIDE 20 MG: 40 TABLET ORAL at 20:27

## 2020-01-06 RX ADMIN — IPRATROPIUM BROMIDE AND ALBUTEROL SULFATE 3 ML: .5; 3 SOLUTION RESPIRATORY (INHALATION) at 16:41

## 2020-01-06 RX ADMIN — IPRATROPIUM BROMIDE AND ALBUTEROL SULFATE 3 ML: .5; 3 SOLUTION RESPIRATORY (INHALATION) at 09:01

## 2020-01-06 RX ADMIN — ALBUTEROL SULFATE 2.5 MG: 2.5 SOLUTION RESPIRATORY (INHALATION) at 20:28

## 2020-01-06 RX ADMIN — CARBAMAZEPINE 200 MG: 200 TABLET ORAL at 09:01

## 2020-01-06 RX ADMIN — TICAGRELOR 90 MG: 90 TABLET ORAL at 09:01

## 2020-01-06 RX ADMIN — MICONAZOLE NITRATE: 20 CREAM TOPICAL at 20:29

## 2020-01-06 NOTE — PROGRESS NOTES
Contacted by staff regarding patient BP low side, decrease metoprolol succinate to 75 mg daily to start in a.m. with hold parameters placed

## 2020-01-06 NOTE — PLAN OF CARE
Problem: Patient Care Overview  Goal: Plan of Care Review  Flowsheets (Taken 1/6/2020 2029)  Outcome Summary: PT tx: Pt agreeable to getting out of bed and walking to her chair today . Pt declined exercises. Pt able to transfer with min A and used rwx and knee brace when walking. She walked 8 ft and required CGA. She required vc for safety awareness however was not receptive to PT vc for sitting safely. Pt frequency is daily-BID.

## 2020-01-06 NOTE — SIGNIFICANT NOTE
01/06/20 1500   Rehab Treatment   Discipline occupational therapist   Reason Treatment Not Performed patient declined treatment,   (Patient declined treatment despite significant encouragement from OT and spouse.)

## 2020-01-06 NOTE — THERAPY TREATMENT NOTE
SNF - Physical Therapy Treatment Note   [facility]     Patient Name: Destiny Catherine  : 1955  MRN: [id number]  Today's Date: 2020  Onset of Illness/Injury or Date of Surgery: 19  Date of Referral to PT: 20  Referring Physician: Sandy    Admit Date: 1/3/2020    Visit Dx:  No diagnosis found.  Patient Active Problem List   Diagnosis   • Atrial fibrillation (CMS/HCC)   • Chronic diastolic heart failure (CMS/HCC)   • Hypertension   • Pulmonary hypertension (CMS/HCC)   • Nonruptured cerebral aneurysm   • Coronary artery disease involving native coronary artery of native heart without angina pectoris   • History of renal stent   • Tobacco abuse   • Epilepsy (CMS/HCC)   • History of colonic polyps   • History of abnormal cervical Pap smear   • HPV in female   • Acute on chronic respiratory failure with hypoxia (CMS/HCC)   • Chronic right-sided heart failure (CMS/HCC)   • Urinary tract infection without hematuria       Therapy Treatment    Rehabilitation Treatment Summary     Row Name 20 0945             Treatment Time/Intention    Discipline  physical therapist  -      Document Type  therapy note (daily note)  -JW      Subjective Information  no complaints  -JW      Mode of Treatment  physical therapy  -JW      Patient/Family Observations  pt asleep in bed  -      Care Plan Review  care plan/treatment goals reviewed  -      Therapy Frequency (PT Clinical Impression)  other (see comments) daily - BID   -JW      Patient Effort  fair  -JW      Comment  pt declines exercises and increasing walking distance. Pt only agreeable to walk to chair and sit up  -JW      Existing Precautions/Restrictions  fall;oxygen therapy device and L/min  -      Treatment Considerations/Comments  pt WBAT with knee brace on and use of rwx  -JW      Patient Response to Treatment  pt not very receptive to safety cues for sitting and sat without following PT education and instructions  -JW      Recorded by [JW]  Olga Jean-Baptiste, PT 01/06/20 1016      Row Name 01/06/20 0945             Cognitive Assessment/Intervention- PT/OT    Orientation Status (Cognition)  oriented x 3  -JW      Follows Commands (Cognition)  WFL;verbal cues/prompting required;repetition of directions required  -      Personal Safety Interventions  gait belt;nonskid shoes/slippers when out of bed  -JW      Recorded by [JW] Olga Jean-Baptiste, PT 01/06/20 1016      Row Name 01/06/20 0945             Safety Issues, Functional Mobility    Safety Issues Affecting Function (Mobility)  ability to follow commands;insight into deficits/self awareness;problem solving;positioning of assistive device  -JW      Impairments Affecting Function (Mobility)  balance;endurance/activity tolerance;strength  -JW      Recorded by [JW] Olga Jean-Baptiste, PT 01/06/20 1016      Row Name 01/06/20 0945             Mobility Assessment/Intervention    Extremity Weight-bearing Status  right lower extremity  -JW      Right Lower Extremity (Weight-bearing Status)  weight-bearing as tolerated (WBAT) with brace on and use of rwx  -JW      Recorded by [JW] Olga Jean-Baptiste, PT 01/06/20 1016      Row Name 01/06/20 0945             Bed Mobility Assessment/Treatment    Supine-Sit [place] (Bed Mobility)  minimum assist (75% patient effort)  -      Bed Mobility, Safety Issues  decreased use of legs for bridging/pushing  -JW      Assistive Device (Bed Mobility)  bed rails;head of bed elevated  -JW      Recorded by [JW] Olga Jean-Baptiste, PT 01/06/20 1016      Row Name 01/06/20 0945             Sit-Stand Transfer    Sit-Stand [place] (Transfers)  contact guard;verbal cues  -      Assistive Device (Sit-Stand Transfers)  walker, front-wheeled  -JW      Recorded by [JW] Olga Jean-Baptiste, PT 01/06/20 1016      Row Name 01/06/20 0945             Stand-Sit Transfer    Stand-Sit [place] (Transfers)  contact guard;verbal cues  -      Assistive Device (Stand-Sit Transfers)  walker, front-wheeled  -      Recorded  by [JW] Olga Jean-Baptiste, PT 01/06/20 1016      Row Name 01/06/20 0945             Gait/Stairs Assessment/Training    Gait/Stairs Assessment/Training  gait/ambulation assistive device  -JW      Cost Level (Gait)  contact guard;verbal cues  -JW      Assistive Device (Gait)  walker, front-wheeled  -JW      Distance in Feet (Gait)  8  -JW      Deviations/Abnormal Patterns (Gait)  gait speed decreased  -JW      Bilateral Gait Deviations  forward flexed posture  -JW      Comment (Gait/Stairs)  pt required vc to maintain walker in safe distance and to maneuver.   -JW      Recorded by [JW] Olga Jean-Baptiste, PT 01/06/20 1016      Row Name 01/06/20 0945             Motor Skills Assessment/Interventions    Additional Documentation  -- pt declined exercises at this time  -JW      Recorded by [JW] Olga Jean-Baptiste, PT 01/06/20 1016      Row Name 01/06/20 0945             Positioning and Restraints    Pre-Treatment Position  in bed  -JW      Post Treatment Position  chair  -JW      In Chair  reclined;sitting;call light within reach;encouraged to call for assist  -JW      Recorded by [JW] Olga Jean-Baptiste, PT 01/06/20 1016      Row Name 01/06/20 0945             Pain Scale: Numbers Pre/Post-Treatment    Pain Scale: Numbers, Pretreatment  0/10 - no pain  -JW      Pain Scale: Numbers, Post-Treatment  0/10 - no pain  -JW      Recorded by [JW] Olga Jean-Baptiste, PT 01/06/20 1016      Row Name 01/06/20 0945             Plan of Care Review    Plan of Care Reviewed With  patient  -JW      Progress  no change  -JW      Outcome Summary  PT tx: Pt agreeable to getting out of bed and walking to her chair today . Pt declined exercises. Pt able to transfer with min A and used rwx and knee brace when walking. She walked 8 ft and required CGA. She required vc for safety awareness however was not receptive to PT vc for sitting safely. Pt frequency is daily-BID.  -JW      Recorded by [JW] Olga Jean-Baptiste, PT 01/06/20 1016      Row Name 01/06/20 0945              Outcome Summary/Treatment Plan (PT)    Daily Summary of Progress (PT)  progress toward functional goals is gradual  -JW      Barriers to Overall Progress (PT)  patient participation  -JW      Plan for Continued Treatment (PT)  increase mobility  -JW      Anticipated Equipment Needs at Discharge (PT)  other (see comments) cont to assess  -JW      Anticipated Discharge Disposition (PT)  home with assist;home with home health  -JW      Recorded by [JUAN DANIEL] Olga Jean-Baptiste, PT 01/06/20 1016        User Key  (r) = Recorded By, (t) = Taken By, (c) = Cosigned By    Initials Name Effective Dates Discipline    JW Olga Jean-Baptiste, PT 06/20/18 -  PT          Rash 12/26/19 1639 Left lower breast (Active)   Distribution localized 1/5/2020  8:15 PM   Configuration/Shape asymmetric 1/5/2020  8:15 PM   Color other (see comments) 1/5/2020  8:15 PM       Rash 12/26/19 1639 Right lower breast (Active)   Distribution localized 1/5/2020  8:15 PM   Configuration/Shape asymmetric 1/5/2020  8:15 PM   Color pink 1/5/2020  8:15 PM               PT Recommendation and Plan  Anticipated Discharge Disposition (PT): home with assist, home with home health  Therapy Frequency (PT Clinical Impression): other (see comments)(daily - BID )  Outcome Summary/Treatment Plan (PT)  Daily Summary of Progress (PT): progress toward functional goals is gradual  Barriers to Overall Progress (PT): patient participation  Plan for Continued Treatment (PT): increase mobility  Anticipated Equipment Needs at Discharge (PT): other (see comments)(cont to assess)  Anticipated Discharge Disposition (PT): home with assist, home with home health  Plan of Care Reviewed With: patient  Progress: no change  Outcome Summary: PT tx: Pt agreeable to getting out of bed and walking to her chair today . Pt declined exercises. Pt able to transfer with min A and used rwx and knee brace when walking. She walked 8 ft and required CGA. She required vc for safety awareness however was not receptive to  PT vc for sitting safely. Pt frequency is daily-BID.     Time Calculation:   PT Charges     Row Name 01/06/20 1017             Time Calculation    Start Time  0945  -JW      Stop Time  1000  -JW      Time Calculation (min)  15 min  -JW         Time Calculation- PT    TCU Minutes- PT  15 min  -JW         Timed Charges    57149 - Gait Training Minutes   15  -JW        User Key  (r) = Recorded By, (t) = Taken By, (c) = Cosigned By    Initials Name Provider Type    Olga Cano, PT Physical Therapist        Therapy Charges for Today     Code Description Service Date Service Provider Modifiers Qty    25969136979 HC GAIT TRAINING EA 15 MIN 1/6/2020 Olga Jean-Baptiste, PT GP 1          PT G-Codes  AM-PAC 6 Clicks Score (PT): 12    Olga Emerson, PT  1/6/2020

## 2020-01-07 PROCEDURE — 97110 THERAPEUTIC EXERCISES: CPT

## 2020-01-07 RX ADMIN — TICAGRELOR 90 MG: 90 TABLET ORAL at 09:18

## 2020-01-07 RX ADMIN — TICAGRELOR 90 MG: 90 TABLET ORAL at 20:28

## 2020-01-07 RX ADMIN — MICONAZOLE NITRATE: 20 POWDER TOPICAL at 09:18

## 2020-01-07 RX ADMIN — MICONAZOLE NITRATE: 20 CREAM TOPICAL at 09:19

## 2020-01-07 RX ADMIN — MICONAZOLE NITRATE: 20 CREAM TOPICAL at 20:28

## 2020-01-07 RX ADMIN — CARBAMAZEPINE 200 MG: 200 TABLET ORAL at 20:28

## 2020-01-07 RX ADMIN — FUROSEMIDE 60 MG: 40 TABLET ORAL at 09:18

## 2020-01-07 RX ADMIN — IPRATROPIUM BROMIDE AND ALBUTEROL SULFATE 3 ML: .5; 3 SOLUTION RESPIRATORY (INHALATION) at 20:29

## 2020-01-07 RX ADMIN — ALBUTEROL SULFATE 2.5 MG: 2.5 SOLUTION RESPIRATORY (INHALATION) at 20:26

## 2020-01-07 RX ADMIN — PANTOPRAZOLE SODIUM 40 MG: 40 TABLET, DELAYED RELEASE ORAL at 06:02

## 2020-01-07 RX ADMIN — IPRATROPIUM BROMIDE AND ALBUTEROL SULFATE 3 ML: .5; 3 SOLUTION RESPIRATORY (INHALATION) at 09:19

## 2020-01-07 RX ADMIN — PRIMIDONE 250 MG: 250 TABLET ORAL at 09:18

## 2020-01-07 RX ADMIN — CARBAMAZEPINE 200 MG: 200 TABLET ORAL at 09:18

## 2020-01-07 RX ADMIN — MICONAZOLE NITRATE: 20 POWDER TOPICAL at 20:28

## 2020-01-07 NOTE — PAYOR COMM NOTE
"Destiny Catherine (64 y.o. Female)   ATTN: BLUE [employer] BLUE [facility]   REF: [id number]  RE: CLINICALS FOR 12/31 FOR REVIEW     Lacy Urban  Utilization Review/Room Reservations  Phone:[phone] ,[phone], [phone], [phone] or [phone]  Fax: [phone]  Email: [email or web address]  Please call, fax back, or email with authorization or any questions! Thanks!    Date of Birth Social Security Number Address Home Phone MRN    1955  [address] [place], [place] [id number] [phone] [id number]    [employer] Marital Status          None        Admission Date Admission Type Admitting Provider Attending Provider Department, Room/Bed    12/26/19 Emergency Ester Delgado MD  [facility] MED SURG, 1423/1    Discharge Date Discharge Disposition Discharge Destination        1/3/2020 Skilled Nursing Facility ([facility])              Attending Provider:  (none)   Allergies:  Plavix [Clopidogrel Bisulfate], Keflex [Cephalexin]    Isolation:  None   Infection:  None   Code Status:  CPR    Ht:  170.2 cm (67.01\")   Wt:  80 kg (176 lb 6.4 oz)    Admission Cmt:  None   Principal Problem:  None                Active Insurance as of 12/26/2019     Primary Coverage     Payor Plan Insurance Group Employer/Plan Group    [facility] PPO [id number]     Payor Plan Address Payor Plan Phone Number Payor Plan Fax Number Effective Dates    [address] [phone]  4/1/2018 - 12/31/2019    [facility] [id number]       Subscriber Name Subscriber Birth Date Member ID       LATONIA,MOHINDER RICE 11/27/1953 [id number]                 Emergency Contacts      (Rel.) Home Phone Work Phone Mobile Phone    Mohinder Catherine (Spouse) [phone] -- --    Yohana Catherine (Daughter) [phone] -- --            Vital Signs (last 7 days) before discharge     Date/Time   Temp   Temp src   Pulse   Resp   BP   " Patient Position   SpO2    01/03/20 1116   97.8 (36.6)   Oral   88   18   115/77   Sitting   91    01/03/20 0752   --   --   95   16   --   --   91    01/03/20 0609   97.7 (36.5)   Oral   85   16   112/85   Lying   94    01/02/20 2300   --   --   78   18   --   --   --    01/02/20 2025   --   --   80   18   --   --   --    01/02/20 1945   96.5 (35.8)   Oral   75   18   96/71   Lying   94    01/02/20 1547   --   --   88   20   --   --   98    01/02/20 1500   96.3 (35.7)   Oral   83   20   115/68   Lying   95    01/02/20 1219   --   --   85   20   --   --   93    01/02/20 1207   --   --   85   20   --   --   96    01/02/20 1100   97.9 (36.6)   Oral   84   20   91/81   Sitting   94    01/02/20 0719   --   --   72   20   --   --   95    01/02/20 0527   97.8 (36.6)   Oral   71   20   114/77   Lying   91    01/01/20 2342   --   --   --   --   --   --   94    01/01/20 2008   97.8 (36.6)   Oral   93   20   101/67   Lying   92    01/01/20 1915   --   --   95   20   --   --   96    01/01/20 1909   --   --   98   20   --   --   96    01/01/20 1522   --   --   84   --   --   --   95    01/01/20 1515   --   --   84   20   --   --   95    01/01/20 1508   97.5 (36.4)   Oral   78   20   103/73   Sitting   100    01/01/20 1139   97.8 (36.6)   Oral   85   20   110/70   Sitting   97    01/01/20 1118   --   --   88   20   --   --   96    01/01/20 1111   --   --   88   20   --   --   96    01/01/20 0805   --   --   80   20   --   --   92    01/01/20 0758   --   --   80   20   --   --   92    01/01/20 0500   97.6 (36.4)   Oral   92   20   104/75   Lying   92    12/31/19 2014   --   --   92   22   --   --   --    12/31/19 2007   --   --   95   24   --   Lying   90    12/31/19 1900   97.1 (36.2)   --   95   18   102/70   --   91    12/31/19 1517   --   --   98   20   --   --   96    12/31/19 1500   97.4 (36.3)   Oral   62   20   104/73   Sitting   97    12/31/19 1117   --   --   84   20   --   --   96    12/31/19 1110   --   --   84   20    --   --   96    12/31/19 1100   98.4 (36.9)   Oral   83   22   105/73   Sitting   94    12/31/19 0741   --   --   84   20   --   --   97    12/31/19 0734   --   --   84   20   --   --   97    12/31/19 0544   98.2 (36.8)   Oral   86   18   125/81   Lying   91    12/30/19 1958   --   --   92   20   --   --   --    12/30/19 1951   --   --   87   18   --   Lying   93    12/30/19 1925   98.1 (36.7)   Oral   87   18   104/72   Lying   94    12/30/19 1615   --   --   89   18   --   --   95    12/30/19 1607   --   --   86   18   --   --   92    12/30/19 1458   98.3 (36.8)   Oral   86   20   107/73   Lying   --    12/30/19 1257   --   --   --   18   --   --   --    12/30/19 1245   --   --   109   18   --   --   95    12/30/19 1100   98.1 (36.7)   Oral   84   20   115/75   Lying   96    12/30/19 0743   --   --   93   --   --   --   --    12/30/19 0735   --   --   76   20   --   --   92    12/30/19 0552   97.4 (36.3)   Oral   86   20   103/73   Lying   93    12/29/19 1931   --   --   89   22   --   Lying   93    12/29/19 1908   97.8 (36.6)   Oral   82   20   101/64   Lying   95    12/29/19 1547   --   --   84   16   --   --   90    12/29/19 1539   97.4 (36.3)   Oral   87   16   115/83   Lying   95    12/29/19 1156   --   --   82   16   --   --   96    12/29/19 0805   --   --   91   16   --   --   92    12/29/19 0542   97.2 (36.2)   Oral   102   16   137/76   Lying   94    12/28/19 2304   97.5 (36.4)   Oral   105   16   112/75   Lying   92    12/28/19 1920   --   --   --   --   --   --   95    12/28/19 1915   97.2 (36.2)   Oral   86   19   115/83   Lying   96    12/28/19 1631   --   --   96   --   --   --   --    12/28/19 1625   --   --   95   20   --   --   94    12/28/19 1530   98 (36.7)   Oral   99   20   132/88   Lying   100    12/28/19 1128   98.2 (36.8)   Oral   89   20   117/82   Lying   91    12/28/19 1107   --   --   92   20   --   --   91    12/28/19 0707   --   --   100   20   --   --   90    12/28/19 0500   98.5  (36.9)   --   102   20   142/96   Lying   91    12/27/19 2006   --   --   95   18   --   --   --    12/27/19 2000   --   --   98   18   --   --   92    12/27/19 1930   --   --   --   --   --   --   93    12/27/19 1900   98.3 (36.8)   Oral   96   18   139/99   Lying   94    12/27/19 1509   --   --   89   20   --   --   94    12/27/19 1503   97.5 (36.4)   Oral   89   20   127/93   Lying   94    12/27/19 1502   --   --   88   20   --   --   94    12/27/19 1137   --   --   96   20   --   --   92    12/27/19 1130   --   --   96   20   --   --   92    12/27/19 0757   --   --   96   20   --   --   94    12/27/19 0750   --   --   96   20   --   --   94    12/27/19 0535   97.7 (36.5)   Oral   98   18   145/97   Lying   91    12/27/19 0302   97.5 (36.4)   Oral   94   18   125/76   Lying   95              Lines, Drains & Airways    Active LDAs     None         Inactive LDAs     Name:   Placement date:   Placement time:   Removal date:   Removal time:   Site:   Days:    [REMOVED] Peripheral IV 12/26/19 1009 Left Antecubital   12/26/19    1009    12/27/19 2054    Antecubital   1    [REMOVED] Peripheral IV 12/27/19 2054 Distal;Posterior;Right;Lateral Forearm   12/27/19 2054 01/03/20    1446    Forearm   6    [REMOVED] Peripheral IV 12/28/19 12/28/19    --    12/29/19    1849    --   1    [REMOVED] Peripheral IV 12/28/19 1201 Right Antecubital   12/28/19    1201    01/01/20    0946    Antecubital   3    [REMOVED] External Urinary Catheter   12/27/19    0411    12/28/19    0916    --   1                  Facility-Administered Medications as of 1/3/2020   Medication Dose Route Frequency Provider Last Rate Last Dose   • [COMPLETED] azithromycin (ZITHROMAX) 500 MG injection  - ADS Override Pull        Stopped at 12/27/19 2130   • [COMPLETED] cefTRIAXone (ROCEPHIN) 1 g injection  - ADS Override Pull        1,000 mg at 12/27/19 2032   • [COMPLETED] cefTRIAXone (ROCEPHIN) IVPB 1 g/50ml dextrose (premix)  1 g Intravenous Q24H  Radha Payne  mL/hr at 20 2257 1 g at 20 2257   • [COMPLETED] dextrose (D5W) 5 % infusion  - ADS Override Pull        Stopped at 19 2100   • [COMPLETED] iopamidol (ISOVUE-370) 76 % injection 100 mL  100 mL Intravenous Once in imaging Ester Delgado MD   100 mL at 19 1354   • [COMPLETED] magnesium oxide (MAGOX) tablet 400 mg  400 mg Oral Once Radha Payne MD   400 mg at 19 0923   • [COMPLETED] piperacillin-tazobactam (ZOSYN) 3.375 g/100 mL 0.9% NS IVPB (mbp)  3.375 g Intravenous Once Hiren Davis MD   Stopped at 19 1500   • [COMPLETED] potassium chloride (K-DUR,KLOR-CON) CR tablet 40 mEq  40 mEq Oral BID With Meals Radha Payne MD   40 mEq at 19 1149   • [] sodium chloride 0.9 % infusion  - ADS Override Pull            • [COMPLETED] sodium chloride 0.9 % infusion  - ADS Override Pull        Stopped at 19 2130     Blood Administration Record (From admission, onward)    None        Lab Results (last 7 days)     Procedure Component Value Units Date/Time    Basic Metabolic Panel [[id number]]  (Abnormal) Collected:  20 1106    Specimen:  Blood Updated:  20 1135     Glucose 95 mg/dL      BUN 21 mg/dL      Creatinine 1.07 mg/dL      Sodium 131 mmol/L      Potassium 4.3 mmol/L      Chloride 90 mmol/L      CO2 26.3 mmol/L      Calcium 9.0 mg/dL      eGFR Non African Amer 52 mL/min/1.73      BUN/Creatinine Ratio 19.6     Anion Gap 14.7 mmol/L     Narrative:       GFR Normal >60  Chronic Kidney Disease <60  Kidney Failure <15      POC Glucose Once [[id number]]  (Normal) Collected:  20 0734    Specimen:  Blood Updated:  20 0743     Glucose 105 mg/dL     ANCA Panel [[id number]]  (Abnormal) Collected:  19 0456    Specimen:  Blood Updated:  20 1708     Myeloperoxidase Ab 14.3 U/mL      Antiproteinase 3 (AL-3) 9.2 U/mL      C-ANCA <1:20 titer      P-ANCA <1:20 titer      Comment: The presence of  positive fluorescence exhibiting P-ANCA or C-ANCA  patterns alone is not specific for the diagnosis of Wegener's  Granulomatosis (WG) or microscopic polyangiitis. Decisions about  treatment should not be based solely on ANCA IFA results.  The  International ANCA Group Consensus recommends follow up testing of  positive sera with both MS-3 and MPO-ANCA enzyme immunoassays. As  many as 5% serum samples are positive only by EIA.  Ref. AM J Clin Pathol 1999;111:507-513.        Atypical pANCA <1:20 titer      Comment: The atypical pANCA pattern has been observed in a significant  percentage of patients with ulcerative colitis, primary sclerosing  cholangitis and autoimmune hepatitis.       Narrative:       Performed at:  [address]  [facility]  [id number]  : Nano Mayo MD, Phone:  [phone]  Performed at:  02  [facility]  [id number]  : Umesh Colon PhD, Phone:  [phone]    Osmolality, Urine - Urine, Clean Catch [[id number]] Collected:  01/02/20 0007    Specimen:  Urine, Clean Catch Updated:  01/02/20 1133     Osmolality, Urine 325 mOsm/kg     Narrative:       Osmo Normal Reference Ranges:    Random:  mOsm/kg H2O, depending on fluid intake.  Random: >850 mOsm/kg H20, after 12 hour fluid restriction.    24 Hour: 300-900 mOsm/kg H2O.    Procalcitonin [[id number]]  (Normal) Collected:  01/02/20 0356    Specimen:  Blood Updated:  01/02/20 0549     Procalcitonin 0.19 ng/mL     Basic Metabolic Panel [[id number]]  (Abnormal) Collected:  01/02/20 0356    Specimen:  Blood Updated:  01/02/20 0544     Glucose 98 mg/dL      BUN 24 mg/dL      Creatinine 1.01 mg/dL      Sodium 127 mmol/L      Potassium 3.5 mmol/L      Chloride 90 mmol/L      CO2 22.4 mmol/L      Calcium 8.1 mg/dL      eGFR Non African Amer 55 mL/min/1.73      BUN/Creatinine Ratio 23.8     Anion Gap 14.6 mmol/L     Narrative:       GFR Normal >60  Chronic Kidney  Disease <60  Kidney Failure <15      Sodium, Urine, Random - Urine, Clean Catch [[id number]] Collected:  01/02/20 0007    Specimen:  Urine, Clean Catch Updated:  01/02/20 0031     Sodium, Urine <20 mmol/L     Narrative:       Reference intervals for random urine have not been established.  Clinical usage is dependent upon physician's interpretation in combination with other laboratory tests.       Basic Metabolic Panel [[id number]]  (Abnormal) Collected:  01/01/20 0330    Specimen:  Blood Updated:  01/01/20 0522     Glucose 89 mg/dL      BUN 22 mg/dL      Creatinine 1.17 mg/dL      Sodium 126 mmol/L      Potassium 3.6 mmol/L      Chloride 87 mmol/L      CO2 23.6 mmol/L      Calcium 8.8 mg/dL      eGFR Non African Amer 47 mL/min/1.73      BUN/Creatinine Ratio 18.8     Anion Gap 15.4 mmol/L     Narrative:       GFR Normal >60  Chronic Kidney Disease <60  Kidney Failure <15      Cyclic Citrul Peptide Antibody, IgG / IgA [[id number]] Collected:  12/30/19 0456    Specimen:  Blood Updated:  12/31/19 2206     CCP Antibodies IgG/IgA 10 units      Comment:                           Negative               <20                            Weak positive      20 - 39                            Moderate positive  40 - 59                            Strong positive        >59       Narrative:       Performed at:  [address]  [facility]  [id number]  : Nano Mayo MD, Phone:  [phone]    C3+C4+ANNA+RA [[id number]] Collected:  12/30/19 0456    Specimen:  Blood Updated:  12/31/19 1409     C3 Complement 139 mg/dL      C4 Complement 20 mg/dL      ANNA Direct Negative     RA Latex Turbid 11.1 IU/mL     Narrative:       Performed at:  01 - [facility] [address]  [id number]  : Umesh Colon PhD, Phone:  [phone]    Basic Metabolic Panel [[id number]]  (Abnormal) Collected:  12/31/19 0440    Specimen:  Blood Updated:  12/31/19 0524     Glucose 113 mg/dL      BUN  18 mg/dL      Creatinine 1.06 mg/dL      Sodium 126 mmol/L      Potassium 3.8 mmol/L      Chloride 90 mmol/L      CO2 23.5 mmol/L      Calcium 8.6 mg/dL      eGFR Non African Amer 52 mL/min/1.73      BUN/Creatinine Ratio 17.0     Anion Gap 12.5 mmol/L     Narrative:       GFR Normal >60  Chronic Kidney Disease <60  Kidney Failure <15      Osmolality, Serum [[id number]]  (Abnormal) Collected:  12/28/19 0327    Specimen:  Blood Updated:  12/30/19 1650     Osmolality 278 mOsm/kg     Vitamin D 25 Hydroxy [[id number]]  (Abnormal) Collected:  12/27/19 0453    Specimen:  Blood Updated:  12/30/19 1512     25 Hydroxy, Vitamin D 22.1 ng/ml      Comment: Results may be falsely increased if patient taking Biotin.  Appended report. These results have been appended to a previously final verified report.       Narrative:       Reference Range for Total Vitamin D 25(OH)     Deficiency <20.0 ng/mL   Insufficiency 21-29 ng/mL   Sufficiency  ng/mL  Toxicity >100 ng/ml    Basic Metabolic Panel [[id number]]  (Abnormal) Collected:  12/30/19 0456    Specimen:  Blood Updated:  12/30/19 0534     Glucose 129 mg/dL      BUN 18 mg/dL      Creatinine 1.16 mg/dL      Sodium 128 mmol/L      Potassium 3.6 mmol/L      Chloride 92 mmol/L      CO2 24.1 mmol/L      Calcium 8.3 mg/dL      eGFR Non African Amer 47 mL/min/1.73      BUN/Creatinine Ratio 15.5     Anion Gap 11.9 mmol/L     Narrative:       GFR Normal >60  Chronic Kidney Disease <60  Kidney Failure <15      Magnesium [[id number]]  (Normal) Collected:  12/30/19 0456    Specimen:  Blood Updated:  12/30/19 0534     Magnesium 1.6 mg/dL     CBC & Differential [[id number]] Collected:  12/30/19 0456    Specimen:  Blood Updated:  12/30/19 0512    Narrative:       The following orders were created for panel order CBC & Differential.  Procedure                               Abnormality         Status                     ---------                               -----------         ------                      CBC Auto Differential[[id number]]        Abnormal            Final result                 Please view results for these tests on the individual orders.    CBC Auto Differential [[id number]]  (Abnormal) Collected:  12/30/19 0456    Specimen:  Blood Updated:  12/30/19 0512     WBC 9.85 10*3/mm3      RBC 3.79 10*6/mm3      Hemoglobin 9.4 g/dL      Hematocrit 30.4 %      MCV 80.2 fL      MCH 24.8 pg      MCHC 30.9 g/dL      RDW 19.0 %      RDW-SD 55.0 fl      MPV 10.7 fL      Platelets 181 10*3/mm3      Neutrophil % 76.7 %      Lymphocyte % 10.3 %      Monocyte % 7.6 %      Eosinophil % 4.0 %      Basophil % 0.6 %      Immature Grans % 0.8 %      Neutrophils, Absolute 7.56 10*3/mm3      Lymphocytes, Absolute 1.01 10*3/mm3      Monocytes, Absolute 0.75 10*3/mm3      Eosinophils, Absolute 0.39 10*3/mm3      Basophils, Absolute 0.06 10*3/mm3      Immature Grans, Absolute 0.08 10*3/mm3      nRBC 0.0 /100 WBC     Urine Culture - Urine, Urine, Clean Catch [[id number]]  (Abnormal) Collected:  12/27/19 1104    Specimen:  Urine, Clean Catch Updated:  12/28/19 1040     Urine Culture <25,000 CFU/mL Escherichia coli    Narrative:       Call if further workup needed.      BNP [[id number]]  (Abnormal) Collected:  12/28/19 0327    Specimen:  Blood Updated:  12/28/19 0803     proBNP 12,336.0 pg/mL     Narrative:       Among patients with dyspnea, NT-proBNP is highly sensitive for the detection of acute congestive heart failure. In addition NT-proBNP of <300 pg/ml effectively rules out acute congestive heart failure with 99% negative predictive value.      Magnesium [[id number]]  (Abnormal) Collected:  12/28/19 0327    Specimen:  Blood Updated:  12/28/19 0440     Magnesium 1.5 mg/dL     Basic Metabolic Panel [[id number]]  (Abnormal) Collected:  12/28/19 0327    Specimen:  Blood Updated:  12/28/19 0438     Glucose 105 mg/dL      BUN 13 mg/dL      Creatinine 1.09 mg/dL      Sodium 132 mmol/L      Potassium 3.3 mmol/L      Chloride 93  mmol/L      CO2 23.5 mmol/L      Calcium 8.6 mg/dL      eGFR Non African Amer 51 mL/min/1.73      BUN/Creatinine Ratio 11.9     Anion Gap 15.5 mmol/L     Narrative:       GFR Normal >60  Chronic Kidney Disease <60  Kidney Failure <15      Phosphorus [[id number]]  (Normal) Collected:  12/28/19 0327    Specimen:  Blood Updated:  12/28/19 0438     Phosphorus 3.1 mg/dL     Procalcitonin [[id number]]  (Abnormal) Collected:  12/28/19 0327    Specimen:  Blood Updated:  12/28/19 0438     Procalcitonin 0.33 ng/mL     CBC & Differential [[id number]] Collected:  12/28/19 0327    Specimen:  Blood Updated:  12/28/19 0414    Narrative:       The following orders were created for panel order CBC & Differential.  Procedure                               Abnormality         Status                     ---------                               -----------         ------                     CBC Auto Differential[[id number]]        Abnormal            Final result                 Please view results for these tests on the individual orders.    CBC Auto Differential [[id number]]  (Abnormal) Collected:  12/28/19 0327    Specimen:  Blood Updated:  12/28/19 0414     WBC 8.58 10*3/mm3      RBC 3.92 10*6/mm3      Hemoglobin 9.8 g/dL      Hematocrit 31.7 %      MCV 80.9 fL      MCH 25.0 pg      MCHC 30.9 g/dL      RDW 18.7 %      RDW-SD 54.9 fl      MPV 11.2 fL      Platelets 164 10*3/mm3      Neutrophil % 76.2 %      Lymphocyte % 10.5 %      Monocyte % 10.6 %      Eosinophil % 1.5 %      Basophil % 0.5 %      Immature Grans % 0.7 %      Neutrophils, Absolute 6.54 10*3/mm3      Lymphocytes, Absolute 0.90 10*3/mm3      Monocytes, Absolute 0.91 10*3/mm3      Eosinophils, Absolute 0.13 10*3/mm3      Basophils, Absolute 0.04 10*3/mm3      Immature Grans, Absolute 0.06 10*3/mm3      nRBC 0.0 /100 WBC     Procalcitonin [[id number]]  (Abnormal) Collected:  12/27/19 0453    Specimen:  Blood Updated:  12/27/19 1934     Procalcitonin 0.41 ng/mL           Imaging  Results (Last 7 Days)     Procedure Component Value Units Date/Time    CT Angiogram Chest With & Without Contrast [[id number]] Collected:  12/28/19 1332     Updated:  12/28/19 1334    Narrative:       CT ANGIOGRAM CHEST W WO CONTRAST    INDICATION:   Pulmonary hypertension. Recent admission for femoral fracture on the right. Weakness and confusion.    TECHNIQUE:   CT angiogram of the chest with 100 cc of Isovue-300 IV contrast. 3-D reconstructions were obtained and reviewed.   Radiation dose reduction techniques included automated exposure control or exposure modulation based on body size. Count of known CT and  cardiac nuc med studies performed in previous 12 months: 1.     COMPARISON:   None available.    FINDINGS:   Chest images at mediastinal window show no adenopathy or effusions. No pulmonary artery filling defects are seen to suggest emboli. The central pulmonary arteries are prominent consistent with pulmonary hypertension. The main pulmonary artery measures  3.8 cm in diameter. Both the left and right atria are enlarged more extensively on the right. Coronary artery calcifications are seen in all 3 coronary distributions. There is a moderate sliding hiatal hernia. Reflux of contrast is noted into the hepatic  veins consistent with elevated right heart pressures.    Chest images at lung window show a thick walled bleb at the right lung base. Interstitial markings are generally prominent bilaterally.      Impression:       There are findings of pulmonary hypertension as detailed above. No pulmonary emboli are noted. Three-vessel coronary artery disease is seen. The lungs show diffuse interstitial lung disease bilaterally possibly secondary to pulmonary hypertension. There  is a thick walled bleb at the right lung base measuring 2.5 x 4 cm in transverse diameter.    Signer Name: Ortiz Hernandez MD   Signed: 12/28/2019 1:32 PM   Workstation Name: [id number]    [facility]        ECG/EMG  Results (last 7 days)     ** No results found for the last 168 hours. **        Orders (last 7 days)      Start     Ordered    01/03/20 1105  Discharge readmit patient  Once      01/03/20 1110    01/03/20 1042  Basic Metabolic Panel  STAT      01/03/20 1041    01/03/20 0744  POC Glucose Once  Once      01/03/20 0734    01/02/20 0912  Diet Regular  Diet Effective Now,   Status:  Canceled     Comments:  Pt ok to have pepsi or diet pepsi per verbal from Dr Crawford    01/02/20 0911    01/02/20 0600  Procalcitonin  Morning Draw      01/01/20 1628    01/02/20 0600  CBC & Differential  Morning Draw,   Status:  Canceled      01/01/20 1628    01/02/20 0600  Basic Metabolic Panel  Morning Draw      01/01/20 1628    01/01/20 1914  Diet Regular; Daily Fluid Restriction; Other; 1,200  Diet Effective Now,   Status:  Canceled     Comments:  Pt ok to have pepsi or diet pepsi per verbal from Dr Crawford    01/01/20 1913    01/01/20 0820  Sodium, Urine, Random - Urine, Clean Catch  Once      01/01/20 0820    01/01/20 0820  Osmolality, Urine - Urine, Clean Catch  Once      01/01/20 0820    01/01/20 0600  Basic Metabolic Panel  Morning Draw      12/31/19 1723    12/31/19 2000  Osmolality, Urine - Urine, Clean Catch  Once,   Status:  Canceled      12/30/19 1345    12/31/19 1724  Ambulate Patient  Every Shift,   Status:  Canceled      12/31/19 1723    12/31/19 1723  Diet Regular  Diet Effective Now,   Status:  Canceled     Comments:  Pt ok to have pepsi or diet pepsi per verbal from Dr Crawford    12/31/19 1723    12/30/19 2100  miconazole (MICOTIN) 2 % cream  Every 12 Hours Scheduled,   Status:  Discontinued      12/30/19 1717 12/30/19 1715  Skin Care  As Needed,   Status:  Canceled      12/30/19 1717    12/30/19 1427  calcium carbonate (TUMS) chewable tablet 500 mg (200 mg elemental)  3 Times Daily PRN,   Status:  Discontinued      12/30/19 1428    12/29/19 2017  melatonin tablet 5 mg  Nightly PRN,   Status:  Discontinued      12/29/19 2017     12/28/19 2100  miconazole (MICOTIN) 2 % powder  Every 12 Hours Scheduled,   Status:  Discontinued      12/28/19 1544    12/27/19 2100  furosemide (LASIX) tablet 20 mg  Nightly,   Status:  Discontinued      12/26/19 2208 12/27/19 2100  rosuvastatin (CRESTOR) tablet 40 mg  Nightly,   Status:  Discontinued      12/27/19 1513    12/27/19 1945  cefTRIAXone (ROCEPHIN) IVPB 1 g/50ml dextrose (premix)  Every 24 Hours      12/27/19 1856 12/27/19 1945  azithromycin 500 MG/250 ML 0.9% NS IVPB (MBP)  Every 24 Hours,   Status:  Discontinued      12/27/19 1856 12/27/19 0900  metoprolol succinate XL (TOPROL-XL) 24 hr tablet 100 mg  Daily,   Status:  Discontinued      12/26/19 1549 12/27/19 0900  primidone (MYSOLINE) tablet 250 mg  Daily,   Status:  Discontinued      12/26/19 1549 12/27/19 0900  furosemide (LASIX) tablet 60 mg  Daily,   Status:  Discontinued      12/26/19 2208 12/27/19 0700  pantoprazole (PROTONIX) EC tablet 40 mg  Every Morning,   Status:  Discontinued      12/26/19 1549 12/26/19 2100  carBAMazepine (TEGretol) tablet 200 mg  2 Times Daily,   Status:  Discontinued      12/26/19 1549 12/26/19 2100  sodium chloride 0.9 % flush 10 mL  Every 12 Hours Scheduled,   Status:  Discontinued      12/26/19 1549 12/26/19 2030  ipratropium-albuterol (DUO-NEB) nebulizer solution 3 mL  4 Times Daily - RT,   Status:  Discontinued      12/26/19 1549 12/26/19 1549  nitroglycerin (NITROSTAT) SL tablet 0.4 mg  Every 5 Minutes PRN,   Status:  Discontinued      12/26/19 1549 12/26/19 1549  sodium chloride 0.9 % flush 10 mL  As Needed,   Status:  Discontinued      12/26/19 1549 12/26/19 1549  sodium chloride 0.9 % infusion 40 mL  As Needed,   Status:  Discontinued      12/26/19 1549 12/26/19 1549  acetaminophen (TYLENOL) tablet 650 mg  Every 4 Hours PRN,   Status:  Discontinued      12/26/19 1549    12/26/19 1549  acetaminophen (TYLENOL) 160 MG/5ML solution 650 mg  Every 4 Hours PRN,   Status:   Discontinued      12/26/19 1549    12/26/19 1549  acetaminophen (TYLENOL) suppository 650 mg  Every 4 Hours PRN,   Status:  Discontinued      12/26/19 1549    12/26/19 1549  HYDROcodone-acetaminophen (NORCO) 5-325 MG per tablet 1 tablet  Every 4 Hours PRN,   Status:  Discontinued      12/26/19 1549    12/26/19 0936  sodium chloride 0.9 % flush 10 mL  As Needed,   Status:  Discontinued      12/26/19 0937    Signed and Held  sodium chloride 0.9 % flush 10 mL  As Needed,   Status:  Canceled      Signed and Held    Signed and Held  carBAMazepine (TEGretol) tablet 200 mg  2 Times Daily,   Status:  Canceled      Signed and Held    Signed and Held  pantoprazole (PROTONIX) EC tablet 40 mg  Every Morning,   Status:  Canceled      Signed and Held    Signed and Held  ipratropium-albuterol (DUO-NEB) nebulizer solution 3 mL  4 Times Daily - RT,   Status:  Canceled      Signed and Held    Signed and Held  metoprolol succinate XL (TOPROL-XL) 24 hr tablet 100 mg  Daily,   Status:  Canceled      Signed and Held    Signed and Held  nitroglycerin (NITROSTAT) SL tablet 0.4 mg  Every 5 Minutes PRN,   Status:  Canceled      Signed and Held    Signed and Held  primidone (MYSOLINE) tablet 250 mg  Daily,   Status:  Canceled      Signed and Held    Signed and Held  Code Status and Medical Interventions:  Continuous,   Status:  Canceled      Signed and Held    Signed and Held  Insert Peripheral IV  Once,   Status:  Canceled      Signed and Held    Signed and Held  sodium chloride 0.9 % flush 10 mL  As Needed,   Status:  Canceled      Signed and Held    Signed and Held  sodium chloride 0.9 % infusion 40 mL  As Needed,   Status:  Canceled      Signed and Held    Signed and Held  acetaminophen (TYLENOL) tablet 650 mg  Every 4 Hours PRN,   Status:  Canceled      Signed and Held    Signed and Held  acetaminophen (TYLENOL) 160 MG/5ML solution 650 mg  Every 4 Hours PRN,   Status:  Canceled      Signed and Held    Signed and Held  acetaminophen  (TYLENOL) suppository 650 mg  Every 4 Hours PRN,   Status:  Canceled      Signed and Held    Signed and Held  HYDROcodone-acetaminophen (NORCO) 5-325 MG per tablet 1 tablet  Every 4 Hours PRN,   Status:  Canceled      Signed and Held    Signed and Held  furosemide (LASIX) tablet 60 mg  Daily,   Status:  Canceled      Signed and Held    Signed and Held  furosemide (LASIX) tablet 20 mg  Nightly,   Status:  Canceled      Signed and Held    Signed and Held  rosuvastatin (CRESTOR) tablet 40 mg  Nightly,   Status:  Canceled      Signed and Held    Signed and Held  DIET MESSAGE Patient does not want gravy sent with meals and does not like oatmeal.  Please see for preferences for each meal.  Send yogurt with all meals.  Once,   Status:  Canceled     Comments:  Patient does not want gravy sent with meals and does not like oatmeal.  Please see for preferences for each meal.  Send yogurt with all meals.    Signed and Held    Signed and Held  miconazole (MICOTIN) 2 % powder  Every 12 Hours Scheduled,   Status:  Canceled      Signed and Held    Signed and Held  Oxygen Therapy- Nasal Cannula; 2 LPM; Titrate for SPO2: 88% - 92%  Continuous,   Status:  Canceled      Signed and Held    Signed and Held  melatonin tablet 5 mg  Nightly PRN,   Status:  Canceled      Signed and Held    Signed and Held  calcium carbonate (TUMS) chewable tablet 500 mg (200 mg elemental)  3 Times Daily PRN,   Status:  Canceled      Signed and Held    Signed and Held  miconazole (MICOTIN) 2 % cream  Every 12 Hours Scheduled,   Status:  Canceled      Signed and Held    Signed and Held  Skin Care  As Needed,   Status:  Canceled      Signed and Held    Signed and Held  Ambulate Patient  Every Shift,   Status:  Canceled      Signed and Held    Signed and Held  Diet Regular  Diet Effective Now,   Status:  Canceled     Comments:  Pt ok to have pepsi or diet pepsi per verbal from Dr Crawford    Signed and Held    Signed and Held  Vital Signs Per Hospital Policy  Per  Hospital Policy,   Status:  Canceled      Signed and Held    Signed and Held  Measure Weight on Admission & Weekly for 4 Weeks, Then Monthly  Per Order Details,   Status:  Canceled     Comments:  Unless Otherwise Ordered By MD LYNDA, or DO    Signed and Held    Signed and Held  tuberculin injection 5 Units  Once,   Status:  Canceled      Signed and Held    Signed and Held  TB Skin Test (Reading)  Once,   Status:  Canceled     Comments:  Use Enter / Edit Results to Document Results Upon Patient Return to Facility      Signed and Held    Signed and Held  PT Consult: Eval & Treat Functional Mobility Below Baseline  Once,   Status:  Canceled      Signed and Held    Signed and Held  OT Consult: Eval & Treat  Once,   Status:  Canceled      Signed and Held    Signed and Held  ticagrelor (BRILINTA) tablet 90 mg  2 Times Daily,   Status:  Canceled      Signed and Held    Signed and Held  CBC & Differential  Once,   Status:  Canceled      Signed and Held    Signed and Held  Basic Metabolic Panel  Once,   Status:  Canceled      Signed and Held                   Physician Progress Notes (last 7 days) (Notes from 12/31/19 1539 through 01/07/20 1539)      Anival Crawford MD at 01/02/20 1532          Hospitalist Team      Patient Care Team:  Becca Unger MD as PCP - General  Becca Unger MD as PCP - Family Medicine        Chief Complaint:  Follow-up Right Femoral Condyle Fracture; Hyponatremia    Subjective    Feels about the same.  Did work a little w/ PT, but then wanted to go back to bed.  Denies chest pain and dyspnea.  Tolerating PO.  Pain well-controlled.    Objective    Vital Signs  Temp:  [96.3 °F (35.7 °C)-97.9 °F (36.6 °C)] 96.3 °F (35.7 °C)  Heart Rate:  [71-98] 83  Resp:  [20] 20  BP: ()/(67-81) 115/68  Oxygen Therapy  SpO2: 95 %  Pulse Oximetry Type: Intermittent  Device (Oxygen Therapy): nasal cannula  Flow (L/min): 2  Oxygen Concentration (%): (decreased to 2L)}    Flowsheet Rows      First Filed Value  "  Admission Height  165.1 cm (65\") Documented at 12/26/2019 0932   Admission Weight  75.5 kg (166 lb 8 oz) Documented at 12/26/2019 0932        Physical Exam:    General: Appears older than stated age.  Lungs: Clear throughout all fields.  Normal excursion.  CV: IRR.  Normal S1 and S2.  Radial pulses are 2+ and symmetric.  Abdomen: Soft and non-tender w/ active bowel sounds  MSK: No C/C.  Right knee effusion still present.  Brace in place.  No pedal edema.  Neuro: CN II-XII grossly intact  Psych: Flat affect.    Results Review:     I reviewed the patient's new clinical results.    Lab Results (last 24 hours)     Procedure Component Value Units Date/Time    Osmolality, Urine - Urine, Clean Catch [[id number]] Collected:  01/02/20 0007    Specimen:  Urine, Clean Catch Updated:  01/02/20 1133     Osmolality, Urine 325 mOsm/kg     Narrative:       Osmo Normal Reference Ranges:    Random:  mOsm/kg H2O, depending on fluid intake.  Random: >850 mOsm/kg H20, after 12 hour fluid restriction.    24 Hour: 300-900 mOsm/kg H2O.    Procalcitonin [[id number]]  (Normal) Collected:  01/02/20 0356    Specimen:  Blood Updated:  01/02/20 0549     Procalcitonin 0.19 ng/mL     Basic Metabolic Panel [[id number]]  (Abnormal) Collected:  01/02/20 0356    Specimen:  Blood Updated:  01/02/20 0544     Glucose 98 mg/dL      BUN 24 mg/dL      Creatinine 1.01 mg/dL      Sodium 127 mmol/L      Potassium 3.5 mmol/L      Chloride 90 mmol/L      CO2 22.4 mmol/L      Calcium 8.1 mg/dL      eGFR Non African Amer 55 mL/min/1.73      BUN/Creatinine Ratio 23.8     Anion Gap 14.6 mmol/L     Narrative:       GFR Normal >60  Chronic Kidney Disease <60  Kidney Failure <15      Sodium, Urine, Random - Urine, Clean Catch [[id number]] Collected:  01/02/20 0007    Specimen:  Urine, Clean Catch Updated:  01/02/20 0031     Sodium, Urine <20 mmol/L     Narrative:       Reference intervals for random urine have not been established.  Clinical usage is dependent " upon physician's interpretation in combination with other laboratory tests.             Imaging Results (Last 24 Hours)     ** No results found for the last 24 hours. **            Medication Review:   I have reviewed the patient's current medication list    Current Facility-Administered Medications:   •  acetaminophen (TYLENOL) tablet 650 mg, 650 mg, Oral, Q4H PRN **OR** acetaminophen (TYLENOL) 160 MG/5ML solution 650 mg, 650 mg, Oral, Q4H PRN **OR** acetaminophen (TYLENOL) suppository 650 mg, 650 mg, Rectal, Q4H PRN, Ester Delgado MD  •  calcium carbonate (TUMS) chewable tablet 500 mg (200 mg elemental), 2 tablet, Oral, TID PRN, Anival Crawford MD, 2 tablet at 12/30/19 2029  •  carBAMazepine (TEGretol) tablet 200 mg, 200 mg, Oral, BID, Ester Delgado MD, 200 mg at 01/02/20 0846  •  cefTRIAXone (ROCEPHIN) IVPB 1 g/50ml dextrose (premix), 1 g, Intravenous, Q24H, Radha Payne MD, Last Rate: 100 mL/hr at 01/01/20 2035, 1 g at 01/01/20 2035  •  furosemide (LASIX) tablet 20 mg, 20 mg, Oral, Nightly, Anival Crawford MD, 20 mg at 01/01/20 2028  •  furosemide (LASIX) tablet 60 mg, 60 mg, Oral, Daily, Anival Crawford MD, 60 mg at 01/02/20 0846  •  HYDROcodone-acetaminophen (NORCO) 5-325 MG per tablet 1 tablet, 1 tablet, Oral, Q4H PRN, Ester Delgado MD, 1 tablet at 01/02/20 0210  •  ipratropium-albuterol (DUO-NEB) nebulizer solution 3 mL, 3 mL, Nebulization, 4x Daily - RT, Ester Delgado MD, 3 mL at 01/02/20 1207  •  melatonin tablet 5 mg, 5 mg, Oral, Nightly PRN, Ester Delgado MD, 5 mg at 01/01/20 2029  •  metoprolol succinate XL (TOPROL-XL) 24 hr tablet 100 mg, 100 mg, Oral, Daily, Ester Delgado MD, 100 mg at 01/02/20 0846  •  miconazole (MICOTIN) 2 % cream, , Topical, Q12H, Anival Crawford MD  •  miconazole (MICOTIN) 2 % powder, , Topical, Q12H, Radha Payne MD  •  nitroglycerin (NITROSTAT) SL tablet 0.4 mg, 0.4  mg, Sublingual, Q5 Min PRN, Ester Delgado MD  •  pantoprazole (PROTONIX) EC tablet 40 mg, 40 mg, Oral, QAM, Ester Delgado MD, 40 mg at 01/02/20 0615  •  primidone (MYSOLINE) tablet 250 mg, 250 mg, Oral, Daily, Ester Delgado MD, 250 mg at 01/02/20 0846  •  rosuvastatin (CRESTOR) tablet 40 mg, 40 mg, Oral, Nightly, Ester Delgado MD, 40 mg at 01/01/20 2028  •  [COMPLETED] Insert peripheral IV, , , Once **AND** sodium chloride 0.9 % flush 10 mL, 10 mL, Intravenous, PRN, Ester Delgado MD  •  sodium chloride 0.9 % flush 10 mL, 10 mL, Intravenous, PRN, Ester Delgado MD  •  sodium chloride 0.9 % flush 10 mL, 10 mL, Intravenous, Q12H, Ester Delgado MD, 10 mL at 01/02/20 0844  •  sodium chloride 0.9 % infusion 40 mL, 40 mL, Intravenous, PRN, Ester Delgado MD      Assessment/Plan     1.  Right Femoral Condyle Fracture w/ Large Effusion/Hemarthrosis:  Continue to wait for pre-cert.  Encouraged better participation w/ PT.     2.  E coli UTI:  Has completed full course.  Rocephin stopped.     3.  Hyponatremia:  Sodium trending up.  Stopped fluid restriction given Urine sodium and Urine [place].  Will monitor sparsely as she is asymptomatic and her past trend has been similar.  Likely multifactorial etiology.     4.  Chronic Afib: No acute issues.  Rate at goal.     5.  Chronic dCHF/Pulmonary HTN:  No acute issues.  Needs RHC in the future.     6.  COPD/Chronic Hypoxic Resp Failure:  No acute issues.  ILD work-up in progress and so far negative.     7.  CKDIII:  Creatinine remains about baseline.     8.  CAD:  No chest pain.      Plan for disposition: Await pre-cert.    Anival Crawford MD  01/02/20  3:32 PM            Electronically signed by Anival Crawford MD at 01/02/20 1905     Ester Delgado MD at 01/01/20 1417          Hospitalist Team      Patient Care Team:  Becca Unger MD as PCP  "- General  Becca Unger MD as PCP - Family Medicine        Chief Complaint: Follow-up right femoral condyle fracture, urinary tract infection, and hyponatremia.    Subjective    Interval History and ROS:     The patient states she feels okay today.  He notes that her pain is controlled currently with oral pain medication.  He denies any chest pain, shortness of breath, or heart palpitations.  He states that she is eating and drinking normally.  She denies any nausea or vomiting.  She is on her home 2 L of oxygen.  Her blood pressure and heart rate are within normal limits.      Objective    Vital Signs  Temp:  [97.1 °F (36.2 °C)-97.8 °F (36.6 °C)] 97.8 °F (36.6 °C)  Heart Rate:  [62-98] 85  Resp:  [18-24] 20  BP: (102-110)/(70-75) 110/70  Oxygen Therapy  SpO2: 97 %  Pulse Oximetry Type: Continuous  Device (Oxygen Therapy): nasal cannula  Flow (L/min): 2      Flowsheet Rows      First Filed Value   Admission Height  165.1 cm (65\") Documented at 12/26/2019 0932   Admission Weight  75.5 kg (166 lb 8 oz) Documented at 12/26/2019 0932            Physical Exam:    Physical Exam   Constitutional: She is oriented to person, place, and time. She appears well-developed and well-nourished. No distress.   Appears older than her stated age.   HENT:   Head: Normocephalic and atraumatic.   Eyes: Conjunctivae and EOM are normal. No scleral icterus.   Neck: Neck supple. No JVD present.   Cardiovascular: Normal rate and normal heart sounds. Exam reveals no gallop and no friction rub.   No murmur heard.  Irregularly irregular rhythm   Pulmonary/Chest: Effort normal. No respiratory distress. She has no wheezes. She has no rales.   Diminished breath sounds throughout.   Abdominal: Soft. Bowel sounds are normal. There is no tenderness.   Musculoskeletal: She exhibits no edema.   Immobilizer brace present to the right lower extremity.   Neurological: She is alert and oriented to person, place, and time.   Psychiatric:   Flat affect. "   Vitals reviewed.      Results Review:     I reviewed the patient's new clinical results.    Lab Results (last 24 hours)     Procedure Component Value Units Date/Time    Basic Metabolic Panel [[id number]]  (Abnormal) Collected:  01/01/20 0330    Specimen:  Blood Updated:  01/01/20 0522     Glucose 89 mg/dL      BUN 22 mg/dL      Creatinine 1.17 mg/dL      Sodium 126 mmol/L      Potassium 3.6 mmol/L      Chloride 87 mmol/L      CO2 23.6 mmol/L      Calcium 8.8 mg/dL      eGFR Non African Amer 47 mL/min/1.73      BUN/Creatinine Ratio 18.8     Anion Gap 15.4 mmol/L     Narrative:       GFR Normal >60  Chronic Kidney Disease <60  Kidney Failure <15      Cyclic Citrul Peptide Antibody, IgG / IgA [[id number]] Collected:  12/30/19 0456    Specimen:  Blood Updated:  12/31/19 2206     CCP Antibodies IgG/IgA 10 units      Comment:                           Negative               <20                            Weak positive      20 - 39                            Moderate positive  40 - 59                            Strong positive        >59       Narrative:       Performed at:  [address] [place] [address]  [id number]  : Nano Mayo MD, Phone:  [phone]          Imaging Results (Last 24 Hours)     ** No results found for the last 24 hours. **        ECG/EMG Results (most recent)     Procedure Component Value Units Date/Time    ECG 12 Lead [[id number]] Collected:  12/26/19 0957     Updated:  12/26/19 1248    Narrative:       HEART RATE= 88  bpm  RR Interval= 680  ms  KS Interval=   ms  P Horizontal Axis=   deg  P Front Axis=   deg  QRSD Interval= 88  ms  QT Interval= 386  ms  QRS Axis= -1  deg  T Wave Axis= 61  deg  - ABNORMAL ECG -  Atrial fibrillation  Borderline T wave abnormalities  No change from prior tracing  Electronically Signed By: Clare Perez ([facility]) 26-Dec-2019 12:48:40  Date and Time of Study: 2019-12-26 09:57:41          Medication Review:   I have reviewed the  patient's current medication list    Current Facility-Administered Medications:   •  acetaminophen (TYLENOL) tablet 650 mg, 650 mg, Oral, Q4H PRN **OR** acetaminophen (TYLENOL) 160 MG/5ML solution 650 mg, 650 mg, Oral, Q4H PRN **OR** acetaminophen (TYLENOL) suppository 650 mg, 650 mg, Rectal, Q4H PRN, Ester Delgado MD  •  calcium carbonate (TUMS) chewable tablet 500 mg (200 mg elemental), 2 tablet, Oral, TID PRN, Anival Crawford MD, 2 tablet at 12/30/19 2029  •  carBAMazepine (TEGretol) tablet 200 mg, 200 mg, Oral, BID, Ester Delgado MD, 200 mg at 01/01/20 0934  •  cefTRIAXone (ROCEPHIN) IVPB 1 g/50ml dextrose (premix), 1 g, Intravenous, Q24H, Radha Payne MD, Last Rate: 100 mL/hr at 12/31/19 2104, 1 g at 12/31/19 2104  •  furosemide (LASIX) tablet 20 mg, 20 mg, Oral, Nightly, Anival Crawford MD, 20 mg at 12/31/19 2109  •  furosemide (LASIX) tablet 60 mg, 60 mg, Oral, Daily, Anival Crawford MD, 60 mg at 01/01/20 0934  •  HYDROcodone-acetaminophen (NORCO) 5-325 MG per tablet 1 tablet, 1 tablet, Oral, Q4H PRN, Ester Delgado MD, 1 tablet at 12/31/19 2117  •  ipratropium-albuterol (DUO-NEB) nebulizer solution 3 mL, 3 mL, Nebulization, 4x Daily - RT, Ester Delgado MD, 3 mL at 01/01/20 1111  •  melatonin tablet 5 mg, 5 mg, Oral, Nightly PRN, Ester Delgado MD, 5 mg at 12/30/19 2029  •  metoprolol succinate XL (TOPROL-XL) 24 hr tablet 100 mg, 100 mg, Oral, Daily, Ester Delgado MD, 100 mg at 01/01/20 0934  •  miconazole (MICOTIN) 2 % cream, , Topical, Q12H, Anival Crawford MD  •  miconazole (MICOTIN) 2 % powder, , Topical, Q12H, Radha Payne MD  •  nitroglycerin (NITROSTAT) SL tablet 0.4 mg, 0.4 mg, Sublingual, Q5 Min PRN, Ester Delgado MD  •  pantoprazole (PROTONIX) EC tablet 40 mg, 40 mg, Oral, QAM, Ester Delgado MD, 40 mg at 01/01/20 0619  •  primidone (MYSOLINE) tablet 250 mg,  250 mg, Oral, Daily, Ester Delgado MD, 250 mg at 01/01/20 0934  •  rosuvastatin (CRESTOR) tablet 40 mg, 40 mg, Oral, Nightly, Ester Delgado MD, 40 mg at 12/31/19 2109  •  [COMPLETED] Insert peripheral IV, , , Once **AND** sodium chloride 0.9 % flush 10 mL, 10 mL, Intravenous, PRN, Ester Delgado MD  •  sodium chloride 0.9 % flush 10 mL, 10 mL, Intravenous, PRN, Ester Delgado MD  •  sodium chloride 0.9 % flush 10 mL, 10 mL, Intravenous, Q12H, Ester Delgado MD, 10 mL at 01/01/20 0936  •  sodium chloride 0.9 % infusion 40 mL, 40 mL, Intravenous, PRN, Ester Delgado MD      Assessment/Plan     -Right Femoral Condyle Fracture with Large Effusion/Hemarthrosis:   Currently pain controlled on oral [place].  Dr. Reynoso with orthopedic surgery saw the patient here and currently this is being treated conservatively with a long leg hinged knee brace for stabilization and nonweightbearing status.  Patient will need to be seen back in Dr. Reynoso's office in 1 to 2 weeks.  Awaiting approval for TCU.    -E coli UTI: Day #6 of Rocephin.  Will continue for full 7-day course.     -Hyponatremia:   Appears chronic in this patient with low sodium levels dating back to 2015 and beyond.  The patient's serum osmolality is very slightly low.  She appears euvolemic on examination.  I have ordered a urine osmolality and urine sodium level for further evaluation.  Sodium level is stable from yesterday at 126.  I will start the patient on a fluid restriction of 1200 mL/day.  Recheck a BMP in the a.m.  If no improvement or worsening could consider nephrology consultation for assistance.    -Chronic A-fib:   Heart rate is at goal.  Patient is on her home dose of Toprol-XL.  She follows with Dr. Musa as an outpatient, not a candidate for anticoagulation.  No acute issues here.     -Chronic dCHF:  No acute issues here.  On home dose of Lasix.   Appears  euvolemic on examination.     -Chronic Hypoxic Resp Failure:   -Probable COPD:  -Pulmonary HTN:   -Abnormal chest CT:  Pulmonary follow the patient here.  Echo done earlier this month shows severe pulmonary hypertension and pulmonary notes the patient will likely need a right heart cath at some point.  Chest CT concerning for ILD serologies pending.  Patient needs to follow-up in the pulmonary office in 4 to 6 weeks status post discharge.  She will require outpatient PFTs and eventually a repeat CT scan, timing will be determined on an outpatient basis.  Continue current oxygen at 2 L nasal cannula.  Continue duo nebs 4 times daily.     -CKD stage III:   Patient's renal function is at her baseline.     -CAD with history of MI and prior stent placement:  -History of CVA:  No acute issues here currently.  Patient continued on her home dose of Brilinta and Crestor.    -Chronic normocytic anemia:  Baseline hemoglobin appears 9-10 as far back as March 2018.  Has no signs or symptoms of overt bleeding other than the hemarthrosis noted related to her fracture.  Hemoglobin here has been fairly stable in her normal range.       Plan for disposition: Awaiting insurance precertification for our skilled care and rehab unit.    Ester Delgado MD  01/01/20  2:17 PM          Electronically signed by Ester Delgado MD at 01/01/20 1945     Misael Zapata MD at 01/01/20 0651        No further pulmonary recommendations at this time  Ok to d/c on oxygen plan follow up with pulmonary in 4-6 weeks post discharge.  PLan full pft's at that.  Repeat high kwaku ct timing to be determined at that point and need for RHC to be determined then.     Electronically signed by Misael Zapata MD at 01/01/20 0652     Anival Crawford MD at 12/31/19 9036          Hospitalist Team      Patient Care Team:  Becca Unger MD as PCP - General  Becca Unger MD as PCP - Family Medicine        Chief Complaint: Awaiting  "Rehab; Hyponatremia    Subjective    No acute events overnight.  She feels about her baseline.  She has been up from bed today.  Adequate pain control.  Denies chest pain.  Tolerating PO.    Objective    Vital Signs  Temp:  [97.4 °F (36.3 °C)-98.4 °F (36.9 °C)] 97.4 °F (36.3 °C)  Heart Rate:  [62-98] 98  Resp:  [18-22] 20  BP: (104-125)/(72-81) 104/73  Oxygen Therapy  SpO2: 96 %  Pulse Oximetry Type: Intermittent  Device (Oxygen Therapy): nasal cannula  Flow (L/min): 2  Oxygen Concentration (%): (decreased to 2L)}    Flowsheet Rows      First Filed Value   Admission Height  165.1 cm (65\") Documented at 12/26/2019 0932   Admission Weight  75.5 kg (166 lb 8 oz) Documented at 12/26/2019 0932          Physical Exam:    General: Appears much older than stated age in NAD  Lungs: Diminished throughout all fields.  No wheeze or rales.  No accessory use.  CV: Irregularly, irregular w/o murmur.  Radial pulses are 2+ and symmetric.  Abdomen: Soft and non-tender w/ active bowel sounds  MSK: No C/C/E; Brace in place  Neuro: CN II-XII grossly intact  Psych: Flat affect.  Ox3.    Results Review:     I reviewed the patient's new clinical results.    Lab Results (last 24 hours)     Procedure Component Value Units Date/Time    C3+C4+ANNA+RA [[id number]] Collected:  12/30/19 0456    Specimen:  Blood Updated:  12/31/19 1409     C3 Complement 139 mg/dL      C4 Complement 20 mg/dL      ANNA Direct Negative     RA Latex Turbid 11.1 IU/mL     Narrative:       Performed at:  [facility]  [id number]  : Umesh Colon PhD, Phone:  [phone]    Basic Metabolic Panel [[id number]]  (Abnormal) Collected:  12/31/19 0440    Specimen:  Blood Updated:  12/31/19 0524     Glucose 113 mg/dL      BUN 18 mg/dL      Creatinine 1.06 mg/dL      Sodium 126 mmol/L      Potassium 3.8 mmol/L      Chloride 90 mmol/L      CO2 23.5 mmol/L      Calcium 8.6 mg/dL      eGFR Non African Amer 52 mL/min/1.73      " BUN/Creatinine Ratio 17.0     Anion Gap 12.5 mmol/L     Narrative:       GFR Normal >60  Chronic Kidney Disease <60  Kidney Failure <15            Imaging Results (Last 24 Hours)     ** No results found for the last 24 hours. **            Medication Review:   I have reviewed the patient's current medication list    Current Facility-Administered Medications:   •  acetaminophen (TYLENOL) tablet 650 mg, 650 mg, Oral, Q4H PRN **OR** acetaminophen (TYLENOL) 160 MG/5ML solution 650 mg, 650 mg, Oral, Q4H PRN **OR** acetaminophen (TYLENOL) suppository 650 mg, 650 mg, Rectal, Q4H PRN, Ester Delgado MD  •  azithromycin 500 MG/250 ML 0.9% NS IVPB (MBP), 500 mg, Intravenous, Q24H, Radha Payne MD, Stopped at 12/30/19 2145  •  calcium carbonate (TUMS) chewable tablet 500 mg (200 mg elemental), 2 tablet, Oral, TID PRN, Anival Crawford MD, 2 tablet at 12/30/19 2029  •  carBAMazepine (TEGretol) tablet 200 mg, 200 mg, Oral, BID, Ester Delgado MD, 200 mg at 12/31/19 0914  •  cefTRIAXone (ROCEPHIN) IVPB 1 g/50ml dextrose (premix), 1 g, Intravenous, Q24H, Radha Payne MD, Stopped at 12/30/19 2045  •  furosemide (LASIX) tablet 20 mg, 20 mg, Oral, Nightly, Anival Crawford MD, 20 mg at 12/30/19 2029  •  furosemide (LASIX) tablet 60 mg, 60 mg, Oral, Daily, Anival Crawford MD, 60 mg at 12/31/19 0913  •  HYDROcodone-acetaminophen (NORCO) 5-325 MG per tablet 1 tablet, 1 tablet, Oral, Q4H PRN, Ester Delgado MD, 1 tablet at 12/29/19 1321  •  ipratropium-albuterol (DUO-NEB) nebulizer solution 3 mL, 3 mL, Nebulization, 4x Daily - RT, Ester Delgado MD, 3 mL at 12/31/19 1517  •  melatonin tablet 5 mg, 5 mg, Oral, Nightly PRN, Ester Delgado MD, 5 mg at 12/30/19 2029  •  metoprolol succinate XL (TOPROL-XL) 24 hr tablet 100 mg, 100 mg, Oral, Daily, Ester Delgado MD, 100 mg at 12/31/19 0914  •  miconazole (MICOTIN) 2 % cream, , Topical,  Q12H, Anival Crawford MD  •  miconazole (MICOTIN) 2 % powder, , Topical, Q12H, Radha Payne MD  •  nitroglycerin (NITROSTAT) SL tablet 0.4 mg, 0.4 mg, Sublingual, Q5 Min PRN, Ester Delgado MD  •  pantoprazole (PROTONIX) EC tablet 40 mg, 40 mg, Oral, QAM, Ester Delgado MD, 40 mg at 12/31/19 0602  •  primidone (MYSOLINE) tablet 250 mg, 250 mg, Oral, Daily, Ester Delgado MD, 250 mg at 12/31/19 0913  •  rosuvastatin (CRESTOR) tablet 40 mg, 40 mg, Oral, Nightly, Ester Delgado MD, 40 mg at 12/30/19 2029  •  [COMPLETED] Insert peripheral IV, , , Once **AND** sodium chloride 0.9 % flush 10 mL, 10 mL, Intravenous, PRN, Ester Delgado MD  •  sodium chloride 0.9 % flush 10 mL, 10 mL, Intravenous, PRN, Ester Delgado MD  •  sodium chloride 0.9 % flush 10 mL, 10 mL, Intravenous, Q12H, Ester Delgado MD, 10 mL at 12/31/19 0912  •  sodium chloride 0.9 % infusion 40 mL, 40 mL, Intravenous, PRN, Ester Delgado MD      Assessment/Plan     1.  Right Femoral Condyle Fracture w/ Large Effusion/Hemarthrosis: Await pre-cert for Rehab.  She is WBAT.  Pain controlled w/ Norco.     2.  E coli UTI: Day #5 of Rocephin.  Will continue for full 7-day course.     3.  Hyponatremia: Sodium down a little today.  Osms were normal so not hypotonic.  She has been as low as 125 in the past.  Would expect SIADH to be hypotonic.  She is taking in good PO.  Will liberalize diet.  Still await urine Osms.  On Lasix.    4.  Chronic Afib: Rate at goal.  Continue beta blocker.  No an AC candidate.    5.  Chronic dCHF/Pulmonary HTN: Nothing acute.  Continues on Lasix.  Pulm recommends RHC in future.     6.  COPD/Chronic Hypoxic Resp Failure: Nothing acute.  Appreciate Pulmonary help.  Will turn down O2.  So far, work-up for ILD negative.     7.  CKDIII:  Creatinine about baseline.     8.  CAD:  No reported chest pain.      Plan for  disposition: [facility] pre-cert for rehab.    Anival Crawford MD  12/31/19  5:09 PM            Electronically signed by Anival Crawford MD at 12/31/19 9520       Consult Notes (last 7 days) (Notes from 12/31/19 through 01/07/20)    No notes of this type exist for this encounter.

## 2020-01-07 NOTE — THERAPY TREATMENT NOTE
SNF - Physical Therapy Treatment Note   Pat Jung     Patient Name: Destiny Catherine  : 1955  MRN: [id number]  Today's Date: 2020  Onset of Illness/Injury or Date of Surgery: 19  Date of Referral to PT: 20  Referring Physician: Sandy    Admit Date: 1/3/2020    Visit Dx:  No diagnosis found.  Patient Active Problem List   Diagnosis   • Atrial fibrillation (CMS/HCC)   • Chronic diastolic heart failure (CMS/HCC)   • Hypertension   • Pulmonary hypertension (CMS/HCC)   • Nonruptured cerebral aneurysm   • Coronary artery disease involving native coronary artery of native heart without angina pectoris   • History of renal stent   • Tobacco abuse   • Epilepsy (CMS/HCC)   • History of colonic polyps   • History of abnormal cervical Pap smear   • HPV in female   • Acute on chronic respiratory failure with hypoxia (CMS/HCC)   • Chronic right-sided heart failure (CMS/HCC)   • Urinary tract infection without hematuria       Therapy Treatment    Rehabilitation Treatment Summary     Row Name 20 0846             Treatment Time/Intention    Discipline  physical therapist  -BP      Document Type  therapy note (daily note)  -BP      Subjective Information  no complaints  -BP      Mode of Treatment  physical therapy  -BP      Patient/Family Observations  Patient supine in bed with HOB elevated. O2/NC in use. Patient agreeable to PT   -BP      Care Plan Review  risks/benefits reviewed  -BP      Patient Effort  fair  -BP      Comment  Patient agreeable to mininal activity only despite significant education and encouragement. She continues to refuse to progress participation or mobility.   -BP      Existing Precautions/Restrictions  fall;oxygen therapy device and L/min WBAT with brace on   -BP      Patient Response to Treatment  At this time patient consistently continues to refuse to progress activity or participation during therapy sessions. Due to refusals and poor participation/effort with therapy, will  discharge patient from PT on 1/9. Recommend 24/7 care and home health PT at discharge.   -BP      Recorded by [BP] Nicolette Borjas, PT 01/07/20 0930      Row Name 01/07/20 0846             Cognitive Assessment/Intervention- PT/OT    Personal Safety Interventions  gait belt;nonskid shoes/slippers when out of bed  -BP      Recorded by [BP] Nicolette Borjas, PT 01/07/20 0930      Row Name 01/07/20 0846             Safety Issues, Functional Mobility    Safety Issues Affecting Function (Mobility)  insight into deficits/self awareness;judgment;positioning of assistive device;problem solving;safety precaution awareness;safety precautions follow-through/compliance  -BP      Recorded by [BP] Nicolette Borjas, PT 01/07/20 0930      Row Name 01/07/20 0846             Bed Mobility Assessment/Treatment    Bed Mobility Assessment/Treatment  supine-sit  -BP      Supine-Sit [place] (Bed Mobility)  other (see comments);verbal cues SBA  -BP      Bed Mobility, Safety Issues  decreased use of legs for bridging/pushing  -BP      Assistive Device (Bed Mobility)  bed rails;head of bed elevated  -BP      Comment (Bed Mobility)  Patient requires extended time and cues to perform with maximal independence   -BP      Recorded by [BP] Nicolette Borjas, PT 01/07/20 0930      Row Name 01/07/20 0846             Transfer Assessment/Treatment    Transfer Assessment/Treatment  sit-stand transfer;stand-sit transfer  -BP      Comment (Transfers)  Patient requires verbal cues for hand placement during sit to stand transfers. She requires cues for placement of device as well as improved positioning of body during stand to sit transfers   -BP      Recorded by [BP] Nicolette Borjas, PT 01/07/20 0930      Row Name 01/07/20 0846             Sit-Stand Transfer    Sit-Stand [place] (Transfers)  contact guard;verbal cues  -BP      Assistive Device (Sit-Stand Transfers)  walker, front-wheeled  -BP      Recorded by [BP] Nicolette Borjas, PT  "01/07/20 0930      Row Name 01/07/20 0846             Stand-Sit Transfer    Stand-Sit [place] (Transfers)  contact guard;verbal cues  -BP      Assistive Device (Stand-Sit Transfers)  walker, front-wheeled  -BP      Recorded by [BP] Nicolette Borjas, PT 01/07/20 0930      Row Name 01/07/20 0846             Gait/Stairs Assessment/Training    [place] Level (Gait)  contact guard;verbal cues  -BP      Assistive Device (Gait)  walker, front-wheeled  -BP      Distance in Feet (Gait)  5 seated break followed by an additional 5 feet   -BP      Pattern (Gait)  swing-to  -BP      Deviations/Abnormal Patterns (Gait)  base of support, narrow;gait speed decreased;stride length decreased  -BP      Bilateral Gait Deviations  forward flexed posture  -BP      Comment (Gait/Stairs)  Patient refuses further gait distance despite offering significant rest break. Patient states \"i'm done.\" Patient requires cues for improved step length and upright posture. Patient requires cues for improved safety during directional change to recliner.    -BP      Recorded by [BP] Nicolette Borjas, PT 01/07/20 0930      Row Name 01/07/20 0846             Therapeutic Exercise    Comment (Therapeutic Exercise)  Patient refuses ther ex   -BP      Recorded by [BP] Nicolette Borjas, PT 01/07/20 0930      Row Name 01/07/20 0846             Positioning and Restraints    Pre-Treatment Position  in bed  -BP      Post Treatment Position  chair  -BP      In Chair  reclined;call light within reach;encouraged to call for assist  -BP      Recorded by [BP] Nicolette Borjas, PT 01/07/20 0930      Row Name 01/07/20 0846             Pain Scale: Numbers Pre/Post-Treatment    Pain Scale: Numbers, Pretreatment  0/10 - no pain  -BP      Pain Scale: Numbers, Post-Treatment  0/10 - no pain  -BP      Recorded by [BP] Nicolette Borjas, PT 01/07/20 0930      Row Name 01/07/20 0846             Plan of Care Review    Plan of Care Reviewed With  patient  -BP      " Progress  no change  -BP      Outcome Summary  PT: patient performs supine to sit transfer with CGA, sit to/from stand transfers with CGA and gait x 10 feet with one seated rest break with CGA and use of FWW. Patient continues to require cues to perform all mobility with maximal independence as well as significant cues for safety during all functional transfers and gait. She refuses further gait distance or ther ex despite significant rest break. At this time patient continues to refuse to progress activity or participation during therapy sessions. Will discharge patient from PT on Thursdasy 1/9. Recommend 24/7 care as well as home health at discharge.   -BP      Recorded by [BP] Nicolette Borjas, PT 01/07/20 0930      Row Name 01/07/20 0846             Outcome Summary/Treatment Plan (PT)    Daily Summary of Progress (PT)  unable to show any progress toward functional goals  -BP      Barriers to Overall Progress (PT)  participation and motivation   -BP      Plan for Continued Treatment (PT)  Will discharge patient from PT on 1/9.   -BP      Anticipated Discharge Disposition (PT)  home with 24/7 care;home with home health  -BP      Recorded by [BP] Nicoeltte Borjas, PT 01/07/20 0997        User Key  (r) = Recorded By, (t) = Taken By, (c) = Cosigned By    Initials Name Effective Dates Discipline    BP Nicolette Borjas, PT 04/03/18 -  PT          Rash 12/26/19 1639 Left lower breast (Active)   Distribution localized 1/6/2020  8:31 PM   Configuration/Shape asymmetric 1/6/2020  8:31 PM   Borders irregular 1/6/2020  8:31 PM   Characteristics moist 1/6/2020  8:31 PM   Color red 1/6/2020  8:31 PM   Care, Rash cleansed with;skin cleanser;antimicrobial agent applied 1/6/2020  8:31 PM       Rash 12/26/19 1639 Right lower breast (Active)   Distribution localized 1/6/2020  8:31 PM   Configuration/Shape asymmetric 1/6/2020  8:31 PM   Borders irregular 1/6/2020  8:31 PM   Characteristics moist 1/6/2020  8:31 PM   Color pink  1/6/2020  8:31 PM   Care, Rash skin cleanser;other (see comments);antimicrobial agent applied 1/6/2020  8:31 PM               PT Recommendation and Plan  Anticipated Discharge Disposition (PT): home with 24/7 care, home with home health  Outcome Summary/Treatment Plan (PT)  Daily Summary of Progress (PT): unable to show any progress toward functional goals  Barriers to Overall Progress (PT): participation and motivation   Plan for Continued Treatment (PT): Will discharge patient from PT on 1/9.   Anticipated Discharge Disposition (PT): home with 24/7 care, home with home health  Plan of Care Reviewed With: patient  Progress: no change  Outcome Summary: PT: patient performs supine to sit transfer with CGA, sit to/from stand transfers with CGA and gait x 10 feet with one seated rest break with CGA and use of FWW. Patient continues to require cues to perform all mobility with maximal independence as well as significant cues for safety during all functional transfers and gait. She refuses further gait distance or ther ex despite significant rest break. At this time patient continues to refuse to progress activity or participation during therapy sessions. Will discharge patient from PT on Thursdasy 1/9. Recommend 24/7 care as well as home health at discharge.      Time Calculation:   PT Charges     Row Name 01/07/20 1002             Time Calculation    Start Time  0846  -BP      Stop Time  0858  -BP      Time Calculation (min)  12 min  -BP      PT Received On  01/07/20  -BP      PT - Next Appointment  01/08/20  -BP         Timed Charges    40690 - PT Therapeutic Exercise Minutes  12  -BP        User Key  (r) = Recorded By, (t) = Taken By, (c) = Cosigned By    Initials Name Provider Type    BP Nicolette Borjas, PT Physical Therapist        Therapy Charges for Today     Code Description Service Date Service Provider Modifiers Qty    23035903897 HC PT THER PROC EA 15 MIN 1/7/2020 Nicolette Borjas, PT GP 1          PT  G-Codes  AM-PAC 6 Clicks Score (PT): 12    Nicolette Borjas, PT  1/7/2020

## 2020-01-07 NOTE — PROGRESS NOTES
Adult Nutrition  Assessment/PES    Patient Name:  Destiny Catherine  YOB: 1955  MRN: [id number]  Admit Date:  1/3/2020      Assessment Date:  1/7/2020  Comments:  Po intake improved from acute care.  Will cont to follow.    Reason for Assessment     Row Name 01/07/20 1308          Reason for Assessment    Reason For Assessment  per organizational policy admission assessment     Diagnosis  trauma s/p fall, fx, anemia, chronic hyponatremia hx CHF CKD         Nutrition/Diet History     Row Name 01/07/20 1308          Nutrition/Diet History    Typical Food/Fluid Intake  Pt up in chair. NKFA. Denies issue chew/swallowing. Happy with her now Reg diet. Denies any needs. [place] that she is 67 inches despite arm span showing less on measurement.         Anthropometrics     Row Name 01/07/20 1309          Anthropometrics    Weight  -- 82.6 kg  Height: 67 inches       Usual Body Weight (UBW)    Usual Body Weight  -- varies 160-180's     Weight Loss Time Frame  no 30 day wt, no 6 month weight         Body Mass Index (BMI)    BMI Assessment  BMI 25-29.9: overweight         Labs/Tests/Procedures/Meds     Row Name 01/07/20 1310          Labs/Procedures/Meds    Lab Results Reviewed  reviewed     Lab Results Comments  Na 131, chronic        Diagnostic Tests/Procedures    Diagnostic Test/Procedure Reviewed  reviewed        Medications    Pertinent Medications Reviewed  reviewed     Pertinent Medications Comments  lasix BID         Physical Findings     Row Name 01/07/20 1310          Physical Findings    Skin  other (see comments) rash         Estimated/Assessed Needs     Row Name 01/07/20 1310          Estimated/Assessed Needs    Additional Documentation  Calorie Requirements (Group);Protein Requirements (Group);Fluid Requirements (Group)        Calorie Requirements    Estimated Calorie Need Method  Dino Aguirre     Estimated Calorie Requirement Comment  1692 kcal ( mifflin 1.2 )         Protein Requirements    Est  Protein Requirement Amount (gms/kg)  1.0 gm protein 66-83 gm pro (.8- 1 gm/kg for healing *CKD hx noted)        Fluid Requirements    Estimated Fluid Requirement Method  RDA Method 1692 ml         Nutrition Prescription Ordered     Row Name 01/07/20 1311          Nutrition Prescription PO    Current PO Diet  Regular         Evaluation of Received Nutrient/Fluid Intake     Row Name 01/07/20 1312          Fluid Intake Evaluation    Oral Fluid (mL)  [address] x 3, 52%        PO Evaluation    Number of Meals  9     % PO Intake  61               Problem/Interventions:  Problem 1     Row Name 01/07/20 1312          Nutrition Diagnoses Problem 1    Problem 1  Nutrition Appropriate for Condition at this Time               Intervention Goal     Row Name 01/07/20 1312          Intervention Goal    PO  Continue positive trend;PO intake (%)     PO Intake %  75 % or greater     Weight  Appropriate weight loss lasix BID noted         Nutrition Intervention     Row Name 01/07/20 1313          Nutrition Intervention    RD/Tech Action  Interview for preference;Advise alternate selection;Encourage intake;Follow Tx progress           Education/Evaluation     Row Name 01/07/20 1313          Education    Education  Education offered and refused        Monitor/Evaluation    Monitor  Per protocol;I&O;PO intake;Pertinent labs;Weight           Electronically signed by:  Iliana Brown RD  01/07/20 1:13 PM

## 2020-01-07 NOTE — PLAN OF CARE
Problem: Patient Care Overview  Goal: Plan of Care Review  Flowsheets  Taken 1/7/2020 1002  Plan of Care Reviewed With: patient  Taken 1/7/2020 0846  Outcome Summary: PT: patient performs supine to sit transfer with CGA, sit to/from stand transfers with CGA and gait x 10 feet with one seated rest break with CGA and use of FWW. Patient continues to require cues to perform all mobility with maximal independence as well as significant cues for safety during all functional transfers and gait. She refuses further gait distance or ther ex despite significant rest break. At this time patient continues to refuse to progress activity or participation during therapy sessions. Will discharge patient from PT on Thursdasy 1/9. Recommend 24/7 care as well as home health at discharge.

## 2020-01-08 PROCEDURE — 97116 GAIT TRAINING THERAPY: CPT

## 2020-01-08 PROCEDURE — 97535 SELF CARE MNGMENT TRAINING: CPT

## 2020-01-08 RX ORDER — FUROSEMIDE 20 MG/1
20 TABLET ORAL NIGHTLY
Status: DISCONTINUED | OUTPATIENT
Start: 2020-01-08 | End: 2020-01-10 | Stop reason: HOSPADM

## 2020-01-08 RX ADMIN — MICONAZOLE NITRATE: 20 CREAM TOPICAL at 09:45

## 2020-01-08 RX ADMIN — MICONAZOLE NITRATE: 20 CREAM TOPICAL at 22:08

## 2020-01-08 RX ADMIN — IPRATROPIUM BROMIDE AND ALBUTEROL SULFATE 3 ML: .5; 3 SOLUTION RESPIRATORY (INHALATION) at 13:25

## 2020-01-08 RX ADMIN — IPRATROPIUM BROMIDE AND ALBUTEROL SULFATE 3 ML: .5; 3 SOLUTION RESPIRATORY (INHALATION) at 16:45

## 2020-01-08 RX ADMIN — PRIMIDONE 250 MG: 250 TABLET ORAL at 09:45

## 2020-01-08 RX ADMIN — ROSUVASTATIN CALCIUM 40 MG: 5 TABLET, FILM COATED ORAL at 22:07

## 2020-01-08 RX ADMIN — TICAGRELOR 90 MG: 90 TABLET ORAL at 09:45

## 2020-01-08 RX ADMIN — CARBAMAZEPINE 200 MG: 200 TABLET ORAL at 09:45

## 2020-01-08 RX ADMIN — FUROSEMIDE 60 MG: 40 TABLET ORAL at 09:45

## 2020-01-08 RX ADMIN — IPRATROPIUM BROMIDE AND ALBUTEROL SULFATE 3 ML: .5; 3 SOLUTION RESPIRATORY (INHALATION) at 22:07

## 2020-01-08 RX ADMIN — METOPROLOL SUCCINATE 75 MG: 50 TABLET, EXTENDED RELEASE ORAL at 09:44

## 2020-01-08 RX ADMIN — IPRATROPIUM BROMIDE AND ALBUTEROL SULFATE 3 ML: .5; 3 SOLUTION RESPIRATORY (INHALATION) at 07:52

## 2020-01-08 RX ADMIN — MICONAZOLE NITRATE: 20 POWDER TOPICAL at 22:07

## 2020-01-08 RX ADMIN — TICAGRELOR 90 MG: 90 TABLET ORAL at 22:07

## 2020-01-08 RX ADMIN — CARBAMAZEPINE 200 MG: 200 TABLET ORAL at 22:07

## 2020-01-08 RX ADMIN — PANTOPRAZOLE SODIUM 40 MG: 40 TABLET, DELAYED RELEASE ORAL at 06:24

## 2020-01-08 RX ADMIN — FUROSEMIDE 20 MG: 20 TABLET ORAL at 13:25

## 2020-01-08 RX ADMIN — MICONAZOLE NITRATE: 20 POWDER TOPICAL at 09:45

## 2020-01-08 NOTE — PLAN OF CARE
Problem: Patient Care Overview  Goal: Plan of Care Review  Flowsheets (Taken 1/8/2020 2208)  Outcome Summary: OT: pt performed functional transfers from recliner chair and BSC with CGA and RW. pt required cues for hand placment and safety with transfers. pt performed toilet hygeine in standing with SBA. pt required encouragement to perform toileting and transfers with maximal independence

## 2020-01-08 NOTE — PLAN OF CARE
Patient up in chair. Patient non complaint with care at times. Continues with therapy as ordered. Safety measures in place.

## 2020-01-08 NOTE — THERAPY TREATMENT NOTE
SNF - Occupational Therapy Treatment Note  [facility]     Patient Name: Destiny Catherine  : 1955  MRN: [id number]  Today's Date: 2020  Onset of Illness/Injury or Date of Surgery: 19  Date of Referral to OT: 20  Referring Physician: Sandy    Admit Date: 1/3/2020     No diagnosis found.  Patient Active Problem List   Diagnosis   • Atrial fibrillation (CMS/HCC)   • Chronic diastolic heart failure (CMS/HCC)   • Hypertension   • Pulmonary hypertension (CMS/HCC)   • Nonruptured cerebral aneurysm   • Coronary artery disease involving native coronary artery of native heart without angina pectoris   • History of renal stent   • Tobacco abuse   • Epilepsy (CMS/HCC)   • History of colonic polyps   • History of abnormal cervical Pap smear   • HPV in female   • Acute on chronic respiratory failure with hypoxia (CMS/HCC)   • Chronic right-sided heart failure (CMS/HCC)   • Urinary tract infection without hematuria     Past Medical History:   Diagnosis Date   • Anemia    • Chronic diastolic heart failure (CMS/HCC) 2017   • Colon polyp    • COPD (chronic obstructive pulmonary disease) (CMS/HCC)    • Coronary artery disease involving native coronary artery of native heart without angina pectoris 2017   • Epilepsy (CMS/HCC)    • History of abnormal cervical Pap smear 2019   • History of renal stent 2017   • HPV in female 2019   • Hyperlipidemia    • Hypertension    • Malignant neoplasm of anus (CMS/HCC) 2018   • Nonruptured cerebral aneurysm    • Permanent atrial fibrillation    • Pulmonary hypertension (CMS/HCC)    • Stroke (CMS/HCC)      and 2010- [facility]   • Tobacco abuse    • UTI (urinary tract infection)      Past Surgical History:   Procedure Laterality Date   • APPENDECTOMY     • CEREBRAL ANGIOGRAM      stent placement at [facility]   • CHOLECYSTECTOMY     • COLONOSCOPY N/A 2019    Procedure: COLONOSCOPY; POLYPECTOMY;  Surgeon: Deborah Cedeno MD;  Location: [facility]  OR;  Service: Gastroenterology   • CORONARY STENT PLACEMENT     • TOTAL HIP ARTHROPLASTY         Therapy Treatment    Rehabilitation Treatment Summary     Row Name 01/08/20 0852 01/08/20 0825          Treatment Time/Intention    Discipline  occupational therapist  -  physical therapist  -BP     Document Type  therapy note (daily note)  -  therapy note (daily note)  -BP     Subjective Information  no complaints  -  no complaints  -BP     Mode of Treatment  occupational therapy  -  physical therapy  -BP     Patient/Family Observations  pt sitting on BSC, agreed to treatment  -  Patient supine in bed with HOB elevated. Patient initially refuses however with encouragement agrees to PT. 2L O2/NC.   -BP     Care Plan Review  --  evaluation/treatment results reviewed;care plan/treatment goals reviewed;risks/benefits reviewed  -BP     Patient Effort  fair  -  adequate  -BP     Comment  pt requires encouragement for maximal participation with functional activities  -  --     Existing Precautions/Restrictions  fall;oxygen therapy device and L/min WBAT with brace on  -  fall;oxygen therapy device and L/min 2L O2/NC   -BP     Recorded by [JJ] Britta Crisostomo, OTR 01/08/20 1108 [BP] Nicolette Borjas, PT 01/08/20 1102     Row Name 01/08/20 0825             Cognitive Assessment/Intervention- PT/OT    Personal Safety Interventions  gait belt;nonskid shoes/slippers when out of bed  -BP      Recorded by [BP] Nicolette Borjas, PT 01/08/20 1102      Row Name 01/08/20 0825             Safety Issues, Functional Mobility    Safety Issues Affecting Function (Mobility)  judgment;positioning of assistive device;problem solving;impulsivity  -BP      Recorded by [BP] Nicolette Borjas, PT 01/08/20 1102      Row Name 01/08/20 0852 01/08/20 0825          Bed Mobility Assessment/Treatment    Bed Mobility Assessment/Treatment  --  supine-sit  -BP     Supine-Sit Wheeler (Bed Mobility)  --  contact guard  -BP      Comment (Bed Mobility)  deferred up in chair  -  Patient reports spouse assists her with bed mobility   -BP     Recorded by [JJ] Britta Crisostomo, OTR 01/08/20 1108 [BP] Nicolette Borjas, PT 01/08/20 1102     Row Name 01/08/20 0852 01/08/20 0825          Transfer Assessment/Treatment    Transfer Assessment/Treatment  --  sit-stand transfer;stand-sit transfer  -BP     Comment (Transfers)  pt requires cues for hand placement and safety  -  Patient requires verbal cues for hand placement  -BP     Recorded by [JJ] Britta Crisostomo, OTR 01/08/20 1108 [BP] Nicolette Borjas, PT 01/08/20 1102     Row Name 01/08/20 0852 01/08/20 0825          Sit-Stand Transfer    Sit-Stand Walker (Transfers)  contact guard;verbal cues  -  stand by assist  -BP     Assistive Device (Sit-Stand Transfers)  walker, front-wheeled  -JJ  walker, front-wheeled  -BP     Recorded by [JJ] Britta Crisostomo, OTR 01/08/20 1108 [BP] Nicolette Borjas, PT 01/08/20 1102     Row Name 01/08/20 0852 01/08/20 0825          Stand-Sit Transfer    Stand-Sit Walker (Transfers)  contact guard;verbal cues  -  stand by assist  -BP     Assistive Device (Stand-Sit Transfers)  walker, front-wheeled  -JJ  walker, front-wheeled  -BP     Recorded by [JJ] Britta Crisostomo, OTR 01/08/20 1108 [BP] Nicolette Borjas, PT 01/08/20 1102     Row Name 01/08/20 0852             Stand Pivot/Stand Step Transfer    Stand Pivot/Stand Step Walker  contact guard;verbal cues  -      Assistive Device (Stand Pivot Stand Step Transfer)  walker, front-wheeled  -JJ      Recorded by [JJ] Britta Crisostomo, OTR 01/08/20 1108      Row Name 01/08/20 0825             Gait/Stairs Assessment/Training    Walker Level (Gait)  stand by assist;contact guard;verbal cues  -BP      Assistive Device (Gait)  walker, front-wheeled  -BP      Distance in Feet (Gait)  6 x 3. Seated breaks in between   -BP      Pattern (Gait)  swing-to  -BP       Deviations/Abnormal Patterns (Gait)  stride length decreased;gait speed decreased;base of support, narrow  -BP      Bilateral Gait Deviations  forward flexed posture  -BP      Comment (Gait/Stairs)  Patient refuses longer gait distances. She continues to require cues for safety when performing directional changes to chair.   -BP      Recorded by [BP] Nicolette Borjas, PT 01/08/20 1102      Row Name 01/08/20 0852             Lower Body Dressing Assessment/Training    Comment (Lower Body Dressing)  pt provided with and educated on racher for assist with lb adls and home safety  -JJ      Recorded by [JJ] Britta Crisostomo, OTR 01/08/20 1108      Row Name 01/08/20 0852             Toileting Assessment/Training    [place] Level (Toileting)  toileting skills;perform perineal hygiene SBA  -JJ      Assistive Devices (Toileting)  commode, 3-in-1  -JJ      Toileting Position  supported standing  -JJ      Comment (Toileting)  pt required encouragement to perform toilet hygeine with maximal independence  -JJ      Recorded by [JJ] Britta Crisostomo, OTR 01/08/20 1108      Row Name 01/08/20 0852             Static Standing Balance    Level of [place] (Supported Standing, Static Balance)  standby assist  -JJ      Time Able to Maintain Position (Supported Standing, Static Balance)  45 to 60 seconds  -JJ      Assistive Device Utilized (Supported Standing, Static Balance)  walker, rolling  -JJ      Recorded by [JJ] Britta Crisostomo, OTR 01/08/20 1108      Row Name 01/08/20 0852 01/08/20 0825          Positioning and Restraints    Pre-Treatment Position  bedside commode  -JJ  in bed  -BP     Post Treatment Position  chair  -JJ  chair  -BP     In Chair  reclined;call light within reach;encouraged to call for assist  -JJ  reclined;call light within reach;encouraged to call for assist  -BP     Recorded by [JJ] Britta Crisostomo, OTR 01/08/20 1108 [BP] Nicolette Borjas, PT 01/08/20 1102     Row Name  01/08/20 0852 01/08/20 0825          Pain Scale: Numbers Pre/Post-Treatment    Pain Scale: Numbers, Pretreatment  0/10 - no pain  -JJ  0/10 - no pain  -BP     Pain Scale: Numbers, Post-Treatment  0/10 - no pain  -JJ  0/10 - no pain  -BP     Recorded by [JJ] Britta Crisostomo, OTR 01/08/20 1108 [BP] Nicolette Borjas, PT 01/08/20 1105     Row Name 01/08/20 0852 01/08/20 0825          Plan of Care Review    Plan of Care Reviewed With  patient  -HANK  patient  -BP     Progress  --  no change  -BP     Outcome Summary  OT: pt performed functional transfers from recliner chair and BSC with CGA and RW. pt required cues for hand placment and safety with transfers. pt performed toilet hygeine in standing with SBA. pt required encouragement to perform toileting and transfers with maximal independence  -JJ  PT: Patient performs supine to sit transfer with CGA, sit to/from stand transfers with SBA/CGA and gait x 18 feet with 2 seated breaks with CGA/SBA with use of FWW. Patient continues to require encouragement to participate and progress activity. Plan to discharge patient from PT on 1/9. Patient denies concern for return home. Recommend FWW for mobility as well as 24/7 care and home health.   -BP     Recorded by [JJ] Britta Crisostomo, OTR 01/08/20 1108 [BP] Nicolette Borjas, PT 01/08/20 1105     Row Name 01/08/20 0852             Outcome Summary/Treatment Plan (OT)    Daily Summary of Progress (OT)  progress towards functional goals is fair  -JJ      Plan for Continued Treatment (OT)  cont poc  -JJ      Recorded by [JJ] Britta Crisostomo, OTR 01/08/20 1108      Row Name 01/08/20 0825             Outcome Summary/Treatment Plan (PT)    Daily Summary of Progress (PT)  unable to show any progress toward functional goals  -BP      Plan for Continued Treatment (PT)  one additional visit. Will discharge on 1/9  -BP      Anticipated Equipment Needs at Discharge (PT)  front wheeled walker  -BP      Anticipated  Discharge Disposition (PT)  home with home health;home with 24/7 care  -BP      Recorded by [BP] Yuliana BorjasKevin, PT 01/08/20 1105        User Key  (r) = Recorded By, (t) = Taken By, (c) = Cosigned By    Initials Name Effective Dates Discipline    Britta Calderon, OTR 06/22/16 -  OT    BP Yuliana BorjasKevin, PT 04/03/18 -  PT        Rash 12/26/19 1639 Left lower breast (Active)   Distribution localized 1/7/2020  8:26 PM   Configuration/Shape asymmetric 1/7/2020  8:26 PM   Color other (see comments) 1/7/2020  8:26 PM       Rash 12/26/19 1639 Right lower breast (Active)   Distribution localized 1/7/2020  8:26 PM   Configuration/Shape asymmetric 1/7/2020  8:26 PM   Color pink 1/7/2020  8:26 PM           OT Recommendation and Plan  Outcome Summary/Treatment Plan (OT)  Daily Summary of Progress (OT): progress towards functional goals is fair  Plan for Continued Treatment (OT): cont poc  Anticipated Equipment Needs at Discharge (OT): (pt may benefit from long handled ae)  Anticipated Discharge Disposition (OT): home with assist, home with home health  Planned Therapy Interventions (OT Eval): adaptive equipment training, BADL retraining, functional balance retraining, transfer/mobility retraining  Therapy Frequency (OT Eval): 3 times/wk  Daily Summary of Progress (OT): progress towards functional goals is fair  Plan of Care Review  Plan of Care Reviewed With: patient  Plan of Care Reviewed With: patient  Outcome Summary: OT: pt performed functional transfers from recliner chair and BSC with CGA and RW. pt required cues for hand placment and safety with transfers. pt performed toilet hygeine in standing with SBA. pt required encouragement to perform toileting and transfers with maximal independence       Time Calculation:   Time Calculation- OT     Row Name 01/08/20 1109 01/08/20 1106          Time Calculation- OT    OT Start Time  0852  -JJ  --     OT Stop Time  0905 -JJ  --     OT Time Calculation (min)  13  min  -JMOO  --     TCU Minutes- OT  13 min  -JJ  --        Timed Charges    87877 - Gait Training Minutes   --  15  -BP       User Key  (r) = Recorded By, (t) = Taken By, (c) = Cosigned By    Initials Name Provider Type    Britta Calderon, OTR Occupational Therapist    BP Nicolette Borjas, PT Physical Therapist        Therapy Charges for Today     Code Description Service Date Service Provider Modifiers Qty    52319030835 HC OT SELF CARE/MGMT/TRAIN EA 15 MIN 1/8/2020 Britta Crisostomo, OTR GO 1               Britta Crisostomo, OTR  1/8/2020

## 2020-01-08 NOTE — NURSING NOTE
Seen for initial CWON skin assessment on Rehab unit. All bony areas clear and intact. Patient with candidiasis rash to skin under breasts, L>R. Skin is erythematous and moist to deep breast folds. No intertrigo noted. Foul odor also noted and pt complains of discomfort. Miconazole powder and cream have both been ordered. Recommend not using cream to deep skin creases but rather the powder here and it would be appropriate to place a soft pillow case under breasts to minimize skin to skin contact and friction and help wick moisture. Will continue to follow.

## 2020-01-08 NOTE — THERAPY TREATMENT NOTE
SNF - Physical Therapy Treatment Note   [place]     Patient Name: Destiny Catherine  : 1955  MRN: [id number]  Today's Date: 2020  Onset of Illness/Injury or Date of Surgery: 19  Date of Referral to PT: 20  Referring Physician: Sandy    Admit Date: 1/3/2020    Visit Dx:  No diagnosis found.  Patient Active Problem List   Diagnosis   • Atrial fibrillation (CMS/HCC)   • Chronic diastolic heart failure (CMS/HCC)   • Hypertension   • Pulmonary hypertension (CMS/HCC)   • Nonruptured cerebral aneurysm   • Coronary artery disease involving native coronary artery of native heart without angina pectoris   • History of renal stent   • Tobacco abuse   • Epilepsy (CMS/HCC)   • History of colonic polyps   • History of abnormal cervical Pap smear   • HPV in female   • Acute on chronic respiratory failure with hypoxia (CMS/HCC)   • Chronic right-sided heart failure (CMS/HCC)   • Urinary tract infection without hematuria       Therapy Treatment    Rehabilitation Treatment Summary     Row Name 20 0852 20 0825          Treatment Time/Intention    Discipline  occupational therapist  (Pended)   -  physical therapist  -BP     Document Type  therapy note (daily note)  (Pended)   -  therapy note (daily note)  -BP     Subjective Information  no complaints  (Pended)   -  no complaints  -BP     Mode of Treatment  occupational therapy  (Pended)   -  physical therapy  -BP     Patient/Family Observations  pt sitting on BSC, agreed to treatment  (Pended)   -  Patient supine in bed with HOB elevated. Patient initially refuses however with encouragement agrees to PT. 2L O2/NC.   -BP     Care Plan Review  --  evaluation/treatment results reviewed;care plan/treatment goals reviewed;risks/benefits reviewed  -BP     Patient Effort  fair  (Pended)   -  adequate  -BP     Comment  pt requires encouragement for maximal participation with functional activities  (Pended)   -  --     Existing  Precautions/Restrictions  fall;oxygen therapy device and L/min  (Pended)  WBAT with brace on  -  fall;oxygen therapy device and L/min 2L O2/NC   -BP     Recorded by [JJ] Britta Crisostomo, OTR 01/08/20 1057 [BP] Nicolette Borjas, PT 01/08/20 1102     Row Name 01/08/20 0825             Cognitive Assessment/Intervention- PT/OT    Personal Safety Interventions  gait belt;nonskid shoes/slippers when out of bed  -BP      Recorded by [BP] Nicolette Borjas, PT 01/08/20 1102      Row Name 01/08/20 0825             Safety Issues, Functional Mobility    Safety Issues Affecting Function (Mobility)  judgment;positioning of assistive device;problem solving;impulsivity  -BP      Recorded by [BP] Nicolette Borjas, PT 01/08/20 1102      Row Name 01/08/20 0852 01/08/20 0825          Bed Mobility Assessment/Treatment    Bed Mobility Assessment/Treatment  --  supine-sit  -BP     Supine-Sit Kay (Bed Mobility)  --  contact guard  -BP     Comment (Bed Mobility)  deferred up in chair  (Pended)   -  Patient reports spouse assists her with bed mobility   -BP     Recorded by [JJ] Britta Crisostomo, OTR 01/08/20 1057 [BP] Nicolette Borjas, PT 01/08/20 1102     Row Name 01/08/20 0852 01/08/20 0825          Transfer Assessment/Treatment    Transfer Assessment/Treatment  --  sit-stand transfer;stand-sit transfer  -BP     Comment (Transfers)  pt requires cues for hand placement and safety  (Pended)   -  Patient requires verbal cues for hand placement  -BP     Recorded by [JJ] Britta Crisostomo, OTR 01/08/20 1057 [BP] Nicolette Borjas, PT 01/08/20 1102     Row Name 01/08/20 0852 01/08/20 0825          Sit-Stand Transfer    Sit-Stand Kay (Transfers)  contact guard;verbal cues  (Pended)   -  stand by assist  -BP     Assistive Device (Sit-Stand Transfers)  walker, front-wheeled  (Pended)   -  walker, front-wheeled  -BP     Recorded by [JJ] Britta Crisostomo, OTR 01/08/20 1057 [BP] [place],  Nicolette, PT 01/08/20 1102     Row Name 01/08/20 0852 01/08/20 0825          Stand-Sit Transfer    Stand-Sit [place] (Transfers)  contact guard;verbal cues  (Pended)   -JJ  stand by assist  -BP     Assistive Device (Stand-Sit Transfers)  walker, front-wheeled  (Pended)   -JJ  walker, front-wheeled  -BP     Recorded by [JJ] Britta Crisostomo, OTR 01/08/20 1057 [BP] Nicolette Borjas, PT 01/08/20 1102     Row Name 01/08/20 0852             Stand Pivot/Stand Step Transfer    Stand Pivot/Stand Step [place]  contact guard;verbal cues  (Pended)   -JJ      Assistive Device (Stand Pivot Stand Step Transfer)  walker, front-wheeled  (Pended)   -JJ      Recorded by [JJ] Britta Crisostomo, OTR 01/08/20 1057      Row Name 01/08/20 0825             Gait/Stairs Assessment/Training    [place] Level (Gait)  stand by assist;contact guard;verbal cues  -BP      Assistive Device (Gait)  walker, front-wheeled  -BP      Distance in Feet (Gait)  6 x 3. Seated breaks in between   -BP      Pattern (Gait)  swing-to  -BP      Deviations/Abnormal Patterns (Gait)  stride length decreased;gait speed decreased;base of support, narrow  -BP      Bilateral Gait Deviations  forward flexed posture  -BP      Comment (Gait/Stairs)  Patient refuses longer gait distances. She continues to require cues for safety when performing directional changes to chair.   -BP      Recorded by [BP] Nicolette Borjas, PT 01/08/20 1102      Row Name 01/08/20 0852             Lower Body Dressing Assessment/Training    Comment (Lower Body Dressing)  pt provided with and educated on [place] for assist with lb adls and home safety  (Pended)   -JJ      Recorded by [JJ] Britta Crisostomo, OTR 01/08/20 1101      Row Name 01/08/20 0852             Static Standing Balance    Level of [place] (Supported Standing, Static Balance)  standby assist  (Pended)   -JJ      Time Able to Maintain Position (Supported Standing, Static Balance)  45 to 60  seconds  (Pended)   -JJ      Assistive Device Utilized (Supported Standing, Static Balance)  walker, rolling  (Pended)   -JJ      Recorded by [JJ] Britta Crisostomo, OTR 01/08/20 1103      Row Name 01/08/20 0852 01/08/20 0825          Positioning and Restraints    Pre-Treatment Position  bedside commode  (Pended)   -JJ  in bed  -BP     Post Treatment Position  chair  (Pended)   -JJ  chair  -BP     In Chair  reclined;call light within reach;encouraged to call for assist  (Pended)   -JJ  reclined;call light within reach;encouraged to call for assist  -BP     Recorded by [JJ] Britta Crisostomo, OTR 01/08/20 1103 [BP] Nicolette Borjas, PT 01/08/20 1102     Row Name 01/08/20 0852 01/08/20 0825          Pain Scale: Numbers Pre/Post-Treatment    Pain Scale: Numbers, Pretreatment  0/10 - no pain  (Pended)   -JJ  0/10 - no pain  -BP     Pain Scale: Numbers, Post-Treatment  0/10 - no pain  (Pended)   -JJ  0/10 - no pain  -BP     Recorded by [JJ] Britta Crisostomo, OTR 01/08/20 1103 [BP] Nicolette Borjas, PT 01/08/20 1105     Row Name 01/08/20 0825             Plan of Care Review    Plan of Care Reviewed With  patient  -BP      Progress  no change  -BP      Outcome Summary  PT: Patient performs supine to sit transfer with CGA, sit to/from stand transfers with SBA/CGA and gait x 18 feet with 2 seated breaks with CGA/SBA with use of FWW. Patient continues to require encouragement to participate and progress activity. Plan to discharge patient from PT on 1/9. Patient denies concern for return home. Recommend FWW for mobility as well as 24/7 care and home health.   -BP      Recorded by [BP] Nicolette Borjas, PT 01/08/20 1105      Row Name 01/08/20 0825             Outcome Summary/Treatment Plan (PT)    Daily Summary of Progress (PT)  unable to show any progress toward functional goals  -BP      Plan for Continued Treatment (PT)  one additional visit. Will discharge on 1/9  -BP      Anticipated Equipment Needs at  Discharge (PT)  front wheeled walker  -BP      Anticipated Discharge Disposition (PT)  home with home health;home with 24/7 care  -BP      Recorded by [BP] Nicolette Borjas, PT 01/08/20 1105        User Key  (r) = Recorded By, (t) = Taken By, (c) = Cosigned By    Initials Name Effective Dates Discipline    Britta Calderon, OTR 06/22/16 -  OT    BP Nicolette Borjas, PT 04/03/18 -  PT          Rash 12/26/19 1639 Left lower breast (Active)   Distribution localized 1/7/2020  8:26 PM   Configuration/Shape asymmetric 1/7/2020  8:26 PM   Color other (see comments) 1/7/2020  8:26 PM       Rash 12/26/19 1639 Right lower breast (Active)   Distribution localized 1/7/2020  8:26 PM   Configuration/Shape asymmetric 1/7/2020  8:26 PM   Color pink 1/7/2020  8:26 PM               PT Recommendation and Plan  Anticipated Discharge Disposition (PT): home with home health, home with 24/7 care  Therapy Frequency (PT Clinical Impression): 5 times/wk  Outcome Summary/Treatment Plan (PT)  Daily Summary of Progress (PT): unable to show any progress toward functional goals  Barriers to Overall Progress (PT): participation and motivation   Plan for Continued Treatment (PT): one additional visit. Will discharge on 1/9  Anticipated Equipment Needs at Discharge (PT): front wheeled walker  Anticipated Discharge Disposition (PT): home with home health, home with 24/7 care  Plan of Care Reviewed With: patient  Progress: no change  Outcome Summary: PT: Patient performs supine to sit transfer with CGA, sit to/from stand transfers with SBA/CGA and gait x 18 feet with 2 seated breaks with CGA/SBA with use of FWW. Patient continues to require encouragement to participate and progress activity. Plan to discharge patient from PT on 1/9. Patient denies concern for return home. Recommend FWW for mobility as well as 24/7 care and home health.      Time Calculation:   PT Charges     Row Name 01/08/20 1103             Time Calculation    Start Time   0825  -BP      Stop Time  0840  -BP      Time Calculation (min)  15 min  -BP      PT Received On  01/08/20  -BP      PT - Next Appointment  01/09/20  -BP         Timed Charges    35436 - Gait Training Minutes   15  -BP        User Key  (r) = Recorded By, (t) = Taken By, (c) = Cosigned By    Initials Name Provider Type    BP Nicolette Borjas, PT Physical Therapist        Therapy Charges for Today     Code Description Service Date Service Provider Modifiers Qty    69792663314 HC PT THER PROC EA 15 MIN 1/7/2020 Nicolette Borjas, PT GP 1    14653132197 HC GAIT TRAINING EA 15 MIN 1/8/2020 Nicolette Borjas, PT GP 1          PT G-Codes  AM-PAC 6 Clicks Score (PT): 12    Nicolette Borjas, PT  1/8/2020

## 2020-01-09 PROCEDURE — 97110 THERAPEUTIC EXERCISES: CPT

## 2020-01-09 PROCEDURE — 97535 SELF CARE MNGMENT TRAINING: CPT

## 2020-01-09 RX ORDER — BISACODYL 5 MG/1
5 TABLET, DELAYED RELEASE ORAL DAILY PRN
Status: DISCONTINUED | OUTPATIENT
Start: 2020-01-09 | End: 2020-01-10 | Stop reason: HOSPADM

## 2020-01-09 RX ORDER — BISACODYL 10 MG
10 SUPPOSITORY, RECTAL RECTAL DAILY PRN
Status: DISCONTINUED | OUTPATIENT
Start: 2020-01-09 | End: 2020-01-10 | Stop reason: HOSPADM

## 2020-01-09 RX ORDER — DOCUSATE SODIUM 100 MG/1
100 CAPSULE, LIQUID FILLED ORAL 2 TIMES DAILY
Status: DISCONTINUED | OUTPATIENT
Start: 2020-01-09 | End: 2020-01-10 | Stop reason: HOSPADM

## 2020-01-09 RX ADMIN — PANTOPRAZOLE SODIUM 40 MG: 40 TABLET, DELAYED RELEASE ORAL at 06:26

## 2020-01-09 RX ADMIN — IPRATROPIUM BROMIDE AND ALBUTEROL SULFATE 3 ML: .5; 3 SOLUTION RESPIRATORY (INHALATION) at 12:06

## 2020-01-09 RX ADMIN — MICONAZOLE NITRATE: 20 POWDER TOPICAL at 09:06

## 2020-01-09 RX ADMIN — MICONAZOLE NITRATE: 20 CREAM TOPICAL at 21:29

## 2020-01-09 RX ADMIN — METOPROLOL SUCCINATE 75 MG: 50 TABLET, EXTENDED RELEASE ORAL at 09:04

## 2020-01-09 RX ADMIN — TICAGRELOR 90 MG: 90 TABLET ORAL at 21:27

## 2020-01-09 RX ADMIN — MICONAZOLE NITRATE: 20 CREAM TOPICAL at 09:06

## 2020-01-09 RX ADMIN — IPRATROPIUM BROMIDE AND ALBUTEROL SULFATE 3 ML: .5; 3 SOLUTION RESPIRATORY (INHALATION) at 17:54

## 2020-01-09 RX ADMIN — PRIMIDONE 250 MG: 250 TABLET ORAL at 09:04

## 2020-01-09 RX ADMIN — CARBAMAZEPINE 200 MG: 200 TABLET ORAL at 21:25

## 2020-01-09 RX ADMIN — IPRATROPIUM BROMIDE AND ALBUTEROL SULFATE 3 ML: .5; 3 SOLUTION RESPIRATORY (INHALATION) at 09:00

## 2020-01-09 RX ADMIN — CARBAMAZEPINE 200 MG: 200 TABLET ORAL at 09:04

## 2020-01-09 RX ADMIN — ROSUVASTATIN CALCIUM 40 MG: 5 TABLET, FILM COATED ORAL at 21:26

## 2020-01-09 RX ADMIN — FUROSEMIDE 60 MG: 40 TABLET ORAL at 09:04

## 2020-01-09 RX ADMIN — FUROSEMIDE 20 MG: 20 TABLET ORAL at 15:39

## 2020-01-09 RX ADMIN — TICAGRELOR 90 MG: 90 TABLET ORAL at 09:05

## 2020-01-09 RX ADMIN — MICONAZOLE NITRATE: 20 POWDER TOPICAL at 21:29

## 2020-01-09 RX ADMIN — IPRATROPIUM BROMIDE AND ALBUTEROL SULFATE 3 ML: .5; 3 SOLUTION RESPIRATORY (INHALATION) at 21:26

## 2020-01-09 NOTE — THERAPY DISCHARGE NOTE
SNF - Physical Therapy Treatment Note/Discharge  WHITNEY StewartHumphrey     Patient Name: Destiny Catherine  : 1955  MRN: [id number]  Today's Date: 2020  Onset of Illness/Injury or Date of Surgery: 19  Date of Referral to PT: 20  Referring Physician: Sandy    Admit Date: 1/3/2020    Visit Dx:  No diagnosis found.  Patient Active Problem List   Diagnosis   • Atrial fibrillation (CMS/HCC)   • Chronic diastolic heart failure (CMS/HCC)   • Hypertension   • Pulmonary hypertension (CMS/HCC)   • Nonruptured cerebral aneurysm   • Coronary artery disease involving native coronary artery of native heart without angina pectoris   • History of renal stent   • Tobacco abuse   • Epilepsy (CMS/HCC)   • History of colonic polyps   • History of abnormal cervical Pap smear   • HPV in female   • Acute on chronic respiratory failure with hypoxia (CMS/HCC)   • Chronic right-sided heart failure (CMS/HCC)   • Urinary tract infection without hematuria         Rehab Goal Summary     Row Name 20 1303             Bed Mobility Goal 1 (PT)    Activity/Assistive Device (Bed Mobility Goal 1, PT)  sit to supine;supine to sit  -BP      [place] Level/Cues Needed (Bed Mobility Goal 1, PT)  independent  -BP      Time Frame (Bed Mobility Goal 1, PT)  1 week  -BP      Progress/Outcomes (Bed Mobility Goal 1, PT)  goal not met  -BP         Transfer Goal 1 (PT)    Activity/Assistive Device (Transfer Goal 1, PT)  sit-to-stand/stand-to-sit  -BP      [place] Level/Cues Needed (Transfer Goal 1, PT)  supervision required  -BP      Time Frame (Transfer Goal 1, PT)  1 week  -BP      Progress/Outcome (Transfer Goal 1, PT)  goal not met  -BP         Gait Training Goal 1 (PT)    Activity/Assistive Device (Gait Training Goal 1, PT)  gait (walking locomotion);assistive device use;walker, rolling  -BP      [place] Level (Gait Training Goal 1, PT)  contact guard assist;standby assist;1 person assist  -BP      Distance (Gait Goal 1, PT)   "20 feet  -BP      Time Frame (Gait Training Goal 1, PT)  1 week  -BP      Progress/Outcome (Gait Training Goal 1, PT)  goal not met  -BP         Stairs Goal 1 (PT)    Activity/Assistive Device (Stairs Goal 1, PT)  stairs, all skills  -BP      [place] Level/Cues Needed (Stairs Goal 1, PT)  minimum assist (75% or more patient effort);1 person assist  -BP      Number of Stairs (Stairs Goal 1, PT)  1  -BP      Time Frame (Stairs Goal 1, PT)  1 week Patient refuses stair training  -BP         Patient Education Goal (PT)    Activity (Patient Education Goal, PT)  Patient able to perform 10 reps LE exercises. as appropriate; no ROM of right knee until MD velazquez  -BP      [place]/Cues/Accuracy (Memory Goal 2, PT)  demonstrates adequately;verbalizes understanding  -BP      Time Frame (Patient Education Goal, PT)  1 week  -BP      Progress/Outcome (Patient Education Goal, PT)  goal ongoing Patient refuses ther ex   -BP        User Key  (r) = Recorded By, (t) = Taken By, (c) = Cosigned By    Initials Name Provider Type Discipline    BP Nicolette Borjas, PT Physical Therapist PT        Therapy Treatment  Rehabilitation Treatment Summary     Row Name 01/09/20 0946             Treatment Time/Intention    Discipline  physical therapist  -BP      Document Type  discharge treatment;therapy note (daily note)  -BP      Subjective Information  no complaints  -BP      Mode of Treatment  physical therapy  -BP      Patient/Family Observations  Patient reclined in bedside chair. Agreeable to PT with encouragement   -BP      Care Plan Review  risks/benefits reviewed  -BP      Patient Effort  fair  -BP      Comment  Patient agreeable to minimal activity. Refuses stair training states \"my  will just push me in.\"   -BP      Existing Precautions/Restrictions  fall;oxygen therapy device and L/min  -BP      Treatment Considerations/Comments  Patient continues to refuse to progress activity is not agreeable to stair training " "despite having stairs to enter home.   -BP      Patient Response to Treatment  Will discharge patient from PT today. Patient continues to be unreceptive to education regarding improvement in mobility. She continues to be unwilling to progress gait distance or activity beyond current level of activity.   -BP      Recorded by [BP] Nicolette Borjas, PT 01/09/20 1157      Row Name 01/09/20 0946             Safety Issues, Functional Mobility    Safety Issues Affecting Function (Mobility)  impulsivity;judgment;positioning of assistive device;problem solving  -BP      Comment, Safety Issues/Impairments (Mobility)  Patient is unreceptive to cues to improve safety during mobility.   -BP      Recorded by [BP] Nicolette Borjas, PT 01/09/20 1157      Row Name 01/09/20 0946             Bed Mobility Assessment/Treatment    Comment (Bed Mobility)  deferred, patient up in chair  -BP      Recorded by [BP] Nicolette Borjas, PT 01/09/20 1157      Row Name 01/09/20 0946             Transfer Assessment/Treatment    Transfer Assessment/Treatment  sit-stand transfer;stand-sit transfer  -BP      Comment (Transfers)  Patient requires verbal cues and assist to perform stand to sit transfer following directional change to chair. She requires CGA to min A each transfer to chair from stand as well as max cues. Each time patient states \"i know\" but does not demonstrate any carry over on subsequent transfers   -BP      Recorded by [BP] Nicolette Borjas, PT 01/09/20 1157      Row Name 01/09/20 0946             Sit-Stand Transfer    Sit-Stand [place] (Transfers)  stand by assist  -BP      Assistive Device (Sit-Stand Transfers)  walker, front-wheeled  -BP      Recorded by [BP] Nicolette Borjas, PT 01/09/20 1157      Row Name 01/09/20 0946             Stand-Sit Transfer    Stand-Sit [place] (Transfers)  contact guard;minimum assist (75% patient effort);verbal cues  -BP      Assistive Device (Stand-Sit Transfers)  walker, front-wheeled  " "-BP      Recorded by [BP] Nicolette Borjas, PT 01/09/20 1157      Row Name 01/09/20 0946             Gait/Stairs Assessment/Training    [place] Level (Gait)  stand by assist;contact guard;verbal cues  -BP      Assistive Device (Gait)  walker, front-wheeled  -BP      Distance in Feet (Gait)  6 x 4.   -BP      Pattern (Gait)  swing-to  -BP      Deviations/Abnormal Patterns (Gait)  stride length decreased;gait speed decreased;base of support, narrow  -BP      Bilateral Gait Deviations  forward flexed posture  -BP      Comment (Gait/Stairs)  Patient refuses stair training despite significant encouragement and education. She continues require max cues for safety to perform directional change to chair. Patient states \"i know\" but does not demonstrate carry over.   -BP      Recorded by [BP] Nicolette Borjas, PT 01/09/20 1157      Row Name 01/09/20 0946             Positioning and Restraints    Pre-Treatment Position  sitting in chair/recliner  -BP      Post Treatment Position  chair  -BP      In Chair  notified nsg;reclined;call light within reach;encouraged to call for assist  -BP      Recorded by [BP] Nicolette Borjas, PT 01/09/20 1157      Row Name 01/09/20 0946             Pain Assessment    Additional Documentation  Pain Scale: Numbers Pre/Post-Treatment (Group)  -BP      Recorded by [BP] Nicolette Borjas, PT 01/09/20 1157      Row Name 01/09/20 0946             Pain Scale: Numbers Pre/Post-Treatment    Pain Scale: Numbers, Pretreatment  0/10 - no pain  -BP      Pain Scale: Numbers, Post-Treatment  0/10 - no pain  -BP      Recorded by [BP] Nicolette Borjas, PT 01/09/20 1157      Row Name 01/09/20 0946             Plan of Care Review    Plan of Care Reviewed With  patient  -BP      Progress  no change  -BP      Outcome Summary  PT: Patient performs sit to stand transfers with SBA and stand to sit transfers with CGA/min A. She performs gait x 24 feet with 3 seated breaks. Patient continues to require max cues " "for safety to perform directional change to chair. On 2 occasions patient requires min A to safely descend in to chair. Patient states each time \"I know\" but does not demonstrate any carry over on subsequent transfers. Patient adamently refuses stair training, states \"my  will just push me in.\" Will discharge patient from PT at this time. Patient continues to be unreceptive and unwilling to progress activity or participation. Recommend 24/7 supervision as well as home health PT.   -BP      Recorded by [BP] Nicolette Borjas, PT 01/09/20 1157      Row Name 01/09/20 0946             Outcome Summary/Treatment Plan (PT)    Daily Summary of Progress (PT)  prepare for discharge  -BP      Reason for Discharge (PT Discharge Summary)  other (see comments) patient unreceptive and refuses to progress  -BP      Recorded by [BP] Nicolette Borjas, PT 01/09/20 1157        User Key  (r) = Recorded By, (t) = Taken By, (c) = Cosigned By    Initials Name Effective Dates Discipline    BP Nicolette Borjas, PT 04/03/18 -  PT             PT Recommendation and Plan  Anticipated Discharge Disposition (PT): home with 24/7 care  Therapy Frequency (PT Clinical Impression): 5 times/wk  Outcome Summary/Treatment Plan (PT)  Daily Summary of Progress (PT): prepare for discharge  Barriers to Overall Progress (PT): participation and motivation   Plan for Continued Treatment (PT): one additional visit. Will discharge on 1/9  Anticipated Equipment Needs at Discharge (PT): front wheeled walker  Anticipated Discharge Disposition (PT): home with 24/7 care  Reason for Discharge (PT Discharge Summary): other (see comments)(patient unreceptive and refuses to progress)  Plan of Care Reviewed With: patient  Progress: no change  Outcome Summary: PT: Patient performs sit to stand transfers with SBA and stand to sit transfers with CGA/min A. She performs gait x 24 feet with 3 seated breaks. Patient continues to require max cues for safety to perform " "directional change to chair. On 2 occasions patient requires min A to safely descend in to chair. Patient states each time \"I know\" but does not demonstrate any carry over on subsequent transfers. Patient adamently refuses stair training, states \"my  will just push me in.\" Will discharge patient from PT at this time. Patient continues to be unreceptive and unwilling to progress activity or participation. Recommend 24/7 supervision as well as home health PT.          Time Calculation:   PT Charges     Row Name 01/09/20 1305             Time Calculation    Start Time  0946  -BP      Stop Time  0957  -BP      Time Calculation (min)  11 min  -BP      PT Received On  01/09/20  -BP         Timed Charges    93802 - PT Therapeutic Exercise Minutes  11  -BP        User Key  (r) = Recorded By, (t) = Taken By, (c) = Cosigned By    Initials Name Provider Type    BP Nicolette Borjas, PT Physical Therapist        Therapy Charges for Today     Code Description Service Date Service Provider Modifiers Qty    26776095956 HC GAIT TRAINING EA 15 MIN 1/8/2020 Nicolette Borjas, PT GP 1    50871609342 HC PT THER PROC EA 15 MIN 1/9/2020 Nicolette Borjas, PT GP 1          PT G-Codes  AM-PAC 6 Clicks Score (PT): 12    PT Discharge Summary  Anticipated Discharge Disposition (PT): home with 24/7 care  Reason for Discharge: Non-compliant  Outcomes Achieved: Unable to make functional progress toward goals at this time  Discharge Destination: Home with home health(home with 24/7 care )  S: Patient states \"I want to go home.\" She adamantly refuses stair training states \"my  will just push me in.\" She reports no concerns for return home and that her  will be present to assist her 24/7.   O: Patient has been seen daily x 5 days with an emphasis on improved functional mobility. Patient performs supine to sit transfers with varying amounts of assist from supervision to min A. She performs sit to/from stand transfers with " varying amounts of assist from SBA to min A. Patient performs gait a maximum of 6 feet intervals up to 24 feet. She continues to require max cues for safety during transfers and gait. Patient refuses stair training and there ex consistently.   A: Patient has consistently participated minimally during sessions despite maximum encouragement and education regarding benefit of participation. She has required during each session the same cues for safety during transfers and directional changes however demonstrates no carry over within session or from session to session. She has refused to increase gait distance, participation in lower extremity therapeutic exercise as well as stair training.   P: Will discharge patient from PT at this time as patient continues to be unreceptive and unwilling to progress activity or correct for safety deficits. Recommend home health PT as well as 24/7 supervision upon discharge from SNF.   Nicolette Borjas, PT  1/9/2020

## 2020-01-09 NOTE — PLAN OF CARE
Problem: Patient Care Overview  Goal: Plan of Care Review  Flowsheets (Taken 1/9/2020 1005)  Outcome Summary: OT: pt requires SBA for functional transfers from BSC and able to maintain static standing balance álvaro 1 minute with RW. pt requires encouragement for maximal effort with functional activites. pt states no concerns with returning home with assist from . no further skilled OT recommendations at this time. pt to be discharged home tomorrow with 24/7 care from spouse

## 2020-01-09 NOTE — THERAPY DISCHARGE NOTE
"SNF - Occupational Therapy Treatment Note/Discharge   Pat Jung     Patient Name: Destiny Catherine  : 1955  MRN: [id number]  Today's Date: 2020  Onset of Illness/Injury or Date of Surgery: 19  Date of Referral to OT: 20  Referring Physician: Sandy      Admit Date: 1/3/2020    Visit Dx:   No diagnosis found.  Patient Active Problem List   Diagnosis   • Atrial fibrillation (CMS/HCC)   • Chronic diastolic heart failure (CMS/HCC)   • Hypertension   • Pulmonary hypertension (CMS/HCC)   • Nonruptured cerebral aneurysm   • Coronary artery disease involving native coronary artery of native heart without angina pectoris   • History of renal stent   • Tobacco abuse   • Epilepsy (CMS/HCC)   • History of colonic polyps   • History of abnormal cervical Pap smear   • HPV in female   • Acute on chronic respiratory failure with hypoxia (CMS/HCC)   • Chronic right-sided heart failure (CMS/HCC)   • Urinary tract infection without hematuria       Therapy Treatment    Rehabilitation Treatment Summary     Row Name 20 1005 20 0946          Treatment Time/Intention    Discipline  occupational therapist  -JJ  physical therapist  -BP     Document Type  therapy note (daily note)  -  discharge treatment;therapy note (daily note)  -BP     Subjective Information  no complaints  -  no complaints  -BP     Mode of Treatment  occupational therapy  -J  physical therapy  -BP     Patient/Family Observations  pt reclined in bedside, agreed to treatment  -  Patient reclined in bedside chair. Agreeable to PT with encouragement   -BP     Care Plan Review  risks/benefits reviewed  -  risks/benefits reviewed  -BP     Patient Effort  fair  -J  fair  -BP     Comment  pt states no concerns with returning home with assist from spouse  -  Patient agreeable to minimal activity. Refuses stair training states \"my  will just push me in.\"   -BP     Existing Precautions/Restrictions  fall;oxygen therapy device " "and L/min  -JJ  fall;oxygen therapy device and L/min  -BP     Treatment Considerations/Comments  --  Patient continues to refuse to progress activity is not agreeable to stair training despite having stairs to enter home.   -BP     Patient Response to Treatment  --  Will discharge patient from PT today. Patient continues to be unreceptive to education regarding improvement in mobility. She continues to be unwilling to progress gait distance or activity beyond current level of activity.   -BP     Recorded by [JJ] Britta Crisostomo, OTR 01/09/20 1425 [BP] Nicolette Borjas, PT 01/09/20 1157     Row Name 01/09/20 0946             Safety Issues, Functional Mobility    Safety Issues Affecting Function (Mobility)  impulsivity;judgment;positioning of assistive device;problem solving  -BP      Comment, Safety Issues/Impairments (Mobility)  Patient is unreceptive to cues to improve safety during mobility.   -BP      Recorded by [BP] Nicolette Borjas, PT 01/09/20 1157      Row Name 01/09/20 1005 01/09/20 0946          Bed Mobility Assessment/Treatment    Comment (Bed Mobility)  deferred up in chair  -JJ  deferred, patient up in chair  -BP     Recorded by [JJ] Britta Crisostomo, OTR 01/09/20 1425 [BP] Nicolette Borjas, PT 01/09/20 1157     Row Name 01/09/20 0946             Transfer Assessment/Treatment    Transfer Assessment/Treatment  sit-stand transfer;stand-sit transfer  -BP      Comment (Transfers)  Patient requires verbal cues and assist to perform stand to sit transfer following directional change to chair. She requires CGA to min A each transfer to chair from stand as well as max cues. Each time patient states \"i know\" but does not demonstrate any carry over on subsequent transfers   -BP      Recorded by [BP] Nicolette Borjas, PT 01/09/20 1157      Row Name 01/09/20 1005 01/09/20 0946          Sit-Stand Transfer    Sit-Stand [place] (Transfers)  stand by assist;verbal cues  -J  stand by assist  -BP  " "   Assistive Device (Sit-Stand Transfers)  walker, front-wheeled  -JJ  walker, front-wheeled  -BP     Recorded by [JJ] Britta Crisostomo, OTR 01/09/20 1425 [BP] Nicolette Borjas, PT 01/09/20 1157     Row Name 01/09/20 1005 01/09/20 0946          Stand-Sit Transfer    Stand-Sit [place] (Transfers)  stand by assist;verbal cues  -JJ  contact guard;minimum assist (75% patient effort);verbal cues  -BP     Assistive Device (Stand-Sit Transfers)  walker, front-wheeled  -JJ  walker, front-wheeled  -BP     Recorded by [JJ] Britta Crisostomo, OTR 01/09/20 1425 [BP] Nicolette Borjas, PT 01/09/20 1157     Row Name 01/09/20 1005             Toilet Transfer    Type (Toilet Transfer)  sit-stand;stand-sit  -JJ      [place] Level (Toilet Transfer)  stand by assist;verbal cues  -JJ      Assistive Device (Toilet Transfer)  commode, 3-in-1;walker, front-wheeled  -JJ      Recorded by [JJ] Britta Crisostomo, OTR 01/09/20 1425      Row Name 01/09/20 0946             Gait/Stairs Assessment/Training    [place] Level (Gait)  stand by assist;contact guard;verbal cues  -BP      Assistive Device (Gait)  walker, front-wheeled  -BP      Distance in Feet (Gait)  6 x 4.   -BP      Pattern (Gait)  swing-to  -BP      Deviations/Abnormal Patterns (Gait)  stride length decreased;gait speed decreased;base of support, narrow  -BP      Bilateral Gait Deviations  forward flexed posture  -BP      Comment (Gait/Stairs)  Patient refuses stair training despite significant encouragement and education. She continues require max cues for safety to perform directional change to chair. Patient states \"i know\" but does not demonstrate carry over.   -BP      Recorded by [BP] Nicolette Borjas, PT 01/09/20 1157      Row Name 01/09/20 1005             Static Standing Balance    Level of [place] (Supported Standing, Static Balance)  standby assist  -JJ      Time Able to Maintain Position (Supported Standing, Static Balance)  45 to 60 " seconds  -JJ      Assistive Device Utilized (Supported Standing, Static Balance)  walker, rolling  -JJ      Recorded by [JJ] Britta Crisostomo, OTR 01/09/20 1425      Row Name 01/09/20 1005 01/09/20 0946          Positioning and Restraints    Pre-Treatment Position  sitting in chair/recliner  -JJ  sitting in chair/recliner  -BP     Post Treatment Position  chair  -JJ  chair  -BP     In Chair  reclined;call light within reach;encouraged to call for assist  -JJ  notified nsg;reclined;call light within reach;encouraged to call for assist  -BP     Recorded by [JJ] Britta Crisostomo, OTR 01/09/20 1425 [BP] Nicolette Borjas, PT 01/09/20 1157     Row Name 01/09/20 0946             Pain Assessment    Additional Documentation  Pain Scale: Numbers Pre/Post-Treatment (Group)  -BP      Recorded by [BP] Nicolette Borjas, PT 01/09/20 1157      Row Name 01/09/20 1005 01/09/20 0946          Pain Scale: Numbers Pre/Post-Treatment    Pain Scale: Numbers, Pretreatment  0/10 - no pain  -JJ  0/10 - no pain  -BP     Pain Scale: Numbers, Post-Treatment  0/10 - no pain  -JJ  0/10 - no pain  -BP     Recorded by [JJ] Britta Crisostomo, OTR 01/09/20 1425 [BP] Nicolette Borjas, PT 01/09/20 1157     Row Name 01/09/20 1005 01/09/20 0946          Plan of Care Review    Plan of Care Reviewed With  patient  -JJ  patient  -BP     Progress  --  no change  -BP     Outcome Summary  OT: [place] for [place] [facility] [place] to maintain static standing balance álvaro 1 minute with RW. pt requires encouragement for maximal effort with functional activites. pt states no concerns with returning home with assist from . no further skilled OT recommendations at this time. pt to be discharged home tomorrow with 24/7 care from spouse  -J  PT: Patient performs sit to stand transfers with SBA and stand to sit transfers with CGA/min A. She performs gait x 24 feet with 3 seated breaks. Patient continues to require max cues  "for safety to perform directional change to chair. On 2 occasions patient requires min A to safely descend in to chair. Patient states each time \"I know\" but does not demonstrate any carry over on subsequent transfers. Patient adamently refuses stair training, states \"my  will just push me in.\" Will discharge patient from PT at this time. Patient continues to be unreceptive and unwilling to progress activity or participation. Recommend 24/7 supervision as well as home health PT.   -BP     Recorded by [J] Britta Crisostomo, OTR 01/09/20 1425 [BP] Nicolette Borjas, PT 01/09/20 1157     Row Name 01/09/20 1005             Outcome Summary/Treatment Plan (OT)    Daily Summary of Progress (OT)  progress toward functional goals as expected;progress towards functional goals is fair  -      Plan for Continued Treatment (OT)  discharging OT at this time. pt returning home with assist from spouse  -      Recorded by [JJ] Britta Crisostomo, OTR 01/09/20 1425      Row Name 01/09/20 0946             Outcome Summary/Treatment Plan (PT)    Daily Summary of Progress (PT)  prepare for discharge  -BP      Reason for Discharge (PT Discharge Summary)  other (see comments) patient unreceptive and refuses to progress  -BP      Recorded by [BP] Nicolette Borjas, PT 01/09/20 1157        User Key  (r) = Recorded By, (t) = Taken By, (c) = Cosigned By    Initials Name Effective Dates Discipline     Britta Crisostomo, OTR 06/22/16 -  OT    BP Nicolette Borjas, PT 04/03/18 -  PT             Rehab Goal Summary     Row Name 01/09/20 1303 01/09/20 1005          Bed Mobility Goal 1 (PT)    Activity/Assistive Device (Bed Mobility Goal 1, PT)  sit to supine;supine to sit  -BP  --     [place] Level/Cues Needed (Bed Mobility Goal 1, PT)  independent  -BP  --     Time Frame (Bed Mobility Goal 1, PT)  1 week  -BP  --     Progress/Outcomes (Bed Mobility Goal 1, PT)  goal not met  -BP  --        Transfer Goal 1 (PT)    " Activity/Assistive Device (Transfer Goal 1, PT)  sit-to-stand/stand-to-sit  -BP  --     [place] Level/Cues Needed (Transfer Goal 1, PT)  supervision required  -BP  --     Time Frame (Transfer Goal 1, PT)  1 week  -BP  --     Progress/Outcome (Transfer Goal 1, PT)  goal not met  -BP  --        Gait Training Goal 1 (PT)    Activity/Assistive Device (Gait Training Goal 1, PT)  gait (walking locomotion);assistive device use;walker, rolling  -BP  --     [place] Level (Gait Training Goal 1, PT)  contact guard assist;standby assist;1 person assist  -BP  --     Distance (Gait Goal 1, PT)  20 feet  -BP  --     Time Frame (Gait Training Goal 1, PT)  1 week  -BP  --     Progress/Outcome (Gait Training Goal 1, PT)  goal not met  -BP  --        Stairs Goal 1 (PT)    Activity/Assistive Device (Stairs Goal 1, PT)  stairs, all skills  -BP  --     [place] Level/Cues Needed (Stairs Goal 1, PT)  minimum assist (75% or more patient effort);1 person assist  -BP  --     Number of Stairs (Stairs Goal 1, PT)  1  -BP  --     Time Frame (Stairs Goal 1, PT)  1 week Patient refuses stair training  -BP  --        Patient Education Goal (PT)    Activity (Patient Education Goal, PT)  Patient able to perform 10 reps LE exercises. as appropriate; no ROM of right knee until MD velazquez  -BP  --     [place]/Cues/Accuracy (Memory Goal 2, PT)  demonstrates adequately;verbalizes understanding  -BP  --     Time Frame (Patient Education Goal, PT)  1 week  -BP  --     Progress/Outcome (Patient Education Goal, PT)  goal ongoing Patient refuses ther ex   -BP  --        Transfer Goal 1 (OT)    Activity/Assistive Device (Transfer Goal 1, OT)  --  toilet;commode, 3-in-1;rollator  -JJ     [place] Level/Cues Needed (Transfer Goal 1, OT)  --  standby assist  -JJ     Progress/Outcome (Transfer Goal 1, OT)  --  goal met  -JJ        Dressing Goal 1 (OT)    Activity/Assistive Device (Dressing Goal 1, OT)  --  lower body dressing with long handled  ae if needed  -JJ     [place]/Cues Needed (Dressing Goal 1, OT)  --  contact guard assist  -JJ     Progress/Outcome (Dressing Goal 1, OT)  --  goal met  -JJ        Balance Goal 1 (OT)    Activity/Assistive Device (Balance Goal 1, OT)  --  standing, static  -JJ     [place] Level/Cues Needed (Balance Goal 1, OT)  --  standby assist x 1-2 minutes  -JJ     Progress/Outcomes (Balance Goal 1, OT)  --  goal met  -JJ       User Key  (r) = Recorded By, (t) = Taken By, (c) = Cosigned By    Initials Name Provider Type Discipline    Britta Calderon, OTR Occupational Therapist OT    iNcolette Hampton, PT Physical Therapist PT              OT Recommendation and Plan  Outcome Summary/Treatment Plan (OT)  Daily Summary of Progress (OT): progress toward functional goals as expected, progress towards functional goals is fair  Plan for Continued Treatment (OT): discharging OT at this time. pt returning home with assist from spouse  Anticipated Equipment Needs at Discharge (OT): (pt may benefit from long handled ae)  Anticipated Discharge Disposition (OT): home with 24/7 care  Planned Therapy Interventions (OT Eval): adaptive equipment training, BADL retraining, functional balance retraining, transfer/mobility retraining  Therapy Frequency (OT Eval): 3 times/wk  Daily Summary of Progress (OT): progress toward functional goals as expected, progress towards functional goals is fair  Plan of Care Review  Plan of Care Reviewed With: patient  Plan of Care Reviewed With: patient  Outcome Summary: OT: pt requires SBA for functional transfers from [facility] [place] and able to maintain static standing balance álvaro 1 minute with RW. pt requires encouragement for maximal effort with functional activites. pt states no concerns with returning home with assist from . no further skilled OT recommendations at this time. pt to be discharged home tomorrow with 24/7 care from spouse         Time Calculation:    Time Calculation- OT      Row Name 01/09/20 1430             Time Calculation- OT    OT Start Time  1005  -HANK      OT Stop Time  1017  -HANK      OT Time Calculation (min)  12 min  -HANK      TCU Minutes- OT  12 min  -HANK        User Key  (r) = Recorded By, (t) = Taken By, (c) = Cosigned By    Initials Name Provider Type    Britta Calderon, SAMIRA Occupational Therapist        Therapy Suggested Charges     Code   Minutes Charges    None           Therapy Charges for Today     Code Description Service Date Service Provider Modifiers Qty    55228255470 HC OT SELF CARE/MGMT/TRAIN EA 15 MIN 1/8/2020 Britta Crisostomo OTR GO 1    10741760674 HC OT SELF CARE/MGMT/TRAIN EA 15 MIN 1/9/2020 Britta Crisostomo OTR GO 1               OT Discharge Summary  Anticipated Discharge Disposition (OT): home with 24/7 care  Reason for Discharge: All goals achieved  Outcomes Achieved: Able to achieve all goals within established timeline  Discharge Destination: Home with assist    SAMIRA Christiansen  1/9/2020

## 2020-01-09 NOTE — PLAN OF CARE
"  Problem: Patient Care Overview  Goal: Plan of Care Review  Flowsheets  Taken 1/9/2020 4064  Plan of Care Reviewed With: patient  Taken 1/9/2020 8939  Outcome Summary: PT: Patient performs sit to stand transfers with SBA and stand to sit transfers with CGA/min A. She performs gait x 24 feet with 3 seated breaks. Patient continues to require max cues for safety to perform directional change to chair. On 2 occasions patient requires min A to safely descend in to chair. Patient states each time \"I know\" but does not demonstrate any carry over on subsequent transfers. Patient adamently refuses stair training, states \"my  will just push me in.\" Will discharge patient from PT at this time. Patient continues to be unreceptive and unwilling to progress activity or participation. Recommend 24/7 supervision as well as home health PT.      "

## 2020-01-10 VITALS
RESPIRATION RATE: 18 BRPM | OXYGEN SATURATION: 95 % | HEART RATE: 85 BPM | WEIGHT: 174 LBS | HEIGHT: 67 IN | BODY MASS INDEX: 27.31 KG/M2 | TEMPERATURE: 97.6 F | DIASTOLIC BLOOD PRESSURE: 56 MMHG | SYSTOLIC BLOOD PRESSURE: 108 MMHG

## 2020-01-10 LAB
ANION GAP SERPL CALCULATED.3IONS-SCNC: 13.3 MMOL/L (ref 5–15)
BASOPHILS # BLD AUTO: 0.07 10*3/MM3 (ref 0–0.2)
BASOPHILS NFR BLD AUTO: 1 % (ref 0–1.5)
BUN BLD-MCNC: 28 MG/DL (ref 8–23)
BUN/CREAT SERPL: 23.7 (ref 7–25)
CALCIUM SPEC-SCNC: 9.4 MG/DL (ref 8.6–10.5)
CHLORIDE SERPL-SCNC: 91 MMOL/L (ref 98–107)
CO2 SERPL-SCNC: 24.7 MMOL/L (ref 22–29)
CREAT BLD-MCNC: 1.18 MG/DL (ref 0.57–1)
DEPRECATED RDW RBC AUTO: 55.8 FL (ref 37–54)
EOSINOPHIL # BLD AUTO: 0.29 10*3/MM3 (ref 0–0.4)
EOSINOPHIL NFR BLD AUTO: 4 % (ref 0.3–6.2)
ERYTHROCYTE [DISTWIDTH] IN BLOOD BY AUTOMATED COUNT: 19.1 % (ref 12.3–15.4)
GFR SERPL CREATININE-BSD FRML MDRD: 46 ML/MIN/1.73
GLUCOSE BLD-MCNC: 96 MG/DL (ref 65–99)
HCT VFR BLD AUTO: 31.6 % (ref 34–46.6)
HGB BLD-MCNC: 9.7 G/DL (ref 12–15.9)
IMM GRANULOCYTES # BLD AUTO: 0.03 10*3/MM3 (ref 0–0.05)
IMM GRANULOCYTES NFR BLD AUTO: 0.4 % (ref 0–0.5)
LYMPHOCYTES # BLD AUTO: 0.95 10*3/MM3 (ref 0.7–3.1)
LYMPHOCYTES NFR BLD AUTO: 13 % (ref 19.6–45.3)
MCH RBC QN AUTO: 24.6 PG (ref 26.6–33)
MCHC RBC AUTO-ENTMCNC: 30.7 G/DL (ref 31.5–35.7)
MCV RBC AUTO: 80 FL (ref 79–97)
MONOCYTES # BLD AUTO: 0.62 10*3/MM3 (ref 0.1–0.9)
MONOCYTES NFR BLD AUTO: 8.5 % (ref 5–12)
NEUTROPHILS # BLD AUTO: 5.37 10*3/MM3 (ref 1.7–7)
NEUTROPHILS NFR BLD AUTO: 73.1 % (ref 42.7–76)
NRBC BLD AUTO-RTO: 0 /100 WBC (ref 0–0.2)
PLATELET # BLD AUTO: 334 10*3/MM3 (ref 140–450)
PMV BLD AUTO: 9.9 FL (ref 6–12)
POTASSIUM BLD-SCNC: 3.6 MMOL/L (ref 3.5–5.2)
RBC # BLD AUTO: 3.95 10*6/MM3 (ref 3.77–5.28)
SODIUM BLD-SCNC: 129 MMOL/L (ref 136–145)
WBC NRBC COR # BLD: 7.33 10*3/MM3 (ref 3.4–10.8)

## 2020-01-10 PROCEDURE — 99315 NF DSCHRG MGMT 30 MIN/LESS: CPT | Performed by: HOSPITALIST

## 2020-01-10 PROCEDURE — 80048 BASIC METABOLIC PNL TOTAL CA: CPT | Performed by: HOSPITALIST

## 2020-01-10 PROCEDURE — 85025 COMPLETE CBC W/AUTO DIFF WBC: CPT | Performed by: HOSPITALIST

## 2020-01-10 RX ORDER — METOPROLOL SUCCINATE 25 MG/1
75 TABLET, EXTENDED RELEASE ORAL DAILY
Qty: 90 TABLET | Refills: 0 | Status: SHIPPED | OUTPATIENT
Start: 2020-01-11

## 2020-01-10 RX ADMIN — IPRATROPIUM BROMIDE AND ALBUTEROL SULFATE 3 ML: .5; 3 SOLUTION RESPIRATORY (INHALATION) at 13:00

## 2020-01-10 RX ADMIN — PRIMIDONE 250 MG: 250 TABLET ORAL at 09:10

## 2020-01-10 RX ADMIN — FUROSEMIDE 20 MG: 20 TABLET ORAL at 13:35

## 2020-01-10 RX ADMIN — MICONAZOLE NITRATE: 20 CREAM TOPICAL at 09:12

## 2020-01-10 RX ADMIN — METOPROLOL SUCCINATE 75 MG: 50 TABLET, EXTENDED RELEASE ORAL at 09:10

## 2020-01-10 RX ADMIN — DOCUSATE SODIUM 100 MG: 100 CAPSULE, LIQUID FILLED ORAL at 09:11

## 2020-01-10 RX ADMIN — CARBAMAZEPINE 200 MG: 200 TABLET ORAL at 09:11

## 2020-01-10 RX ADMIN — FUROSEMIDE 60 MG: 40 TABLET ORAL at 09:11

## 2020-01-10 RX ADMIN — MICONAZOLE NITRATE: 20 POWDER TOPICAL at 09:12

## 2020-01-10 RX ADMIN — TICAGRELOR 90 MG: 90 TABLET ORAL at 09:12

## 2020-01-10 RX ADMIN — PANTOPRAZOLE SODIUM 40 MG: 40 TABLET, DELAYED RELEASE ORAL at 06:15

## 2020-01-10 RX ADMIN — IPRATROPIUM BROMIDE AND ALBUTEROL SULFATE 3 ML: .5; 3 SOLUTION RESPIRATORY (INHALATION) at 09:11

## 2020-01-10 NOTE — PLAN OF CARE
Problem: Patient Care Overview  Goal: Plan of Care Review  Outcome: Ongoing (interventions implemented as appropriate)  Flowsheets (Taken 1/9/2020 2200 by Marilyn Avendaño RN)  Plan of Care Reviewed With: patient     Problem: Skin Injury Risk (Adult)  Goal: Identify Related Risk Factors and Signs and Symptoms  Outcome: Ongoing (interventions implemented as appropriate)  Flowsheets (Taken 1/10/2020 1407)  Related Risk Factors (Skin Injury Risk): advanced age; body weight extremes; cognitive impairment     Problem: Patient Care Overview  Goal: Interprofessional Rounds/Family Conf  Outcome: Ongoing (interventions implemented as appropriate)

## 2020-01-10 NOTE — DISCHARGE SUMMARY
Destiny Catherine  1955  [id number]    Hospitalists Discharge Summary    Date of Admission: 1/3/2020  Date of Discharge:  1/10/2020    Discharge Diagnoses:    1.  Encounter for rehabilitation or Right Femoral Condyle Fracture w/ Associated Effusion/Hemarthrosis  2.  Chronic Hyponatremia  3.  Chronic Atrial Fibrillation  4.  Chronic dCHF  5.  Chronic Hypoxic Respiratory Failure (work-up for ILD in process)  6.  CKD III  7.  Hx/O CVA  8.  Chronic Normocytic Anemia    History of Present Illness (taken from H&P):    64-year-old female who presented to hospital 12/26/2019 following mechanical fall after tripping in the bathroom, sustained right femoral condyle fracture with associated joint effusion and hemarthrosis.  Patient was seen by Dr. Reynoso with orthopedic surgery and the decision was made for conservative management.  Patient is placed in long-leg hinged knee brace for stabilization and is allowed for weightbearing as tolerated.  Patient now presents for inpatient rehabilitation.  At this time she has no acute complaints or concerns.  Denies chest pain shortness of breath or palpitations, no cough fever chills or nausea vomiting.  Having regular bowel movements.  Pain in right lower extremity is controlled. Was out of bed today.  Tolerating diet.  Working with physical therapy.      Rehab Course:    Ms. Catherine did not participate at an optimal level w/ therapy (please see their notes and documentation).    PCP  Patient Care Team:  Becca Unger MD as PCP - General  Becca Unger MD as PCP - Family Medicine    Consults:   Consults     Date and Time Order Name Status Description    12/28/2019 0030 Inpatient Pulmonology Consult Completed     12/27/2019 0846 Inpatient Orthopedic Surgery Consult Completed     12/17/2019 0022 Inpatient Cardiology Consult Completed           Operations and Procedures Performed:       Xr Chest 2 View    Result Date: 12/26/2019  Narrative: CHEST X-RAY, 12/26/2019     HISTORY:  64-year-old female in the ED with left side chest pain after a fall in bathroom this morning.  TECHNIQUE: AP and lateral chest series obtained portably.  FINDINGS: There are several old healed right rib fractures including the 4th, 5th, 6th and 7th ribs. No visible left rib fracture. No pneumothorax, pulmonary consolidation or pleural effusion. Moderate cardiomegaly. Hiatal hernia.      Impression: 1. No active disease. 2. Multiple old right rib fractures. 3. Cardiomegaly. 4. Hiatal hernia.  This report was finalized on 12/26/2019 11:10 AM by Dr. Forrest Brooks MD.      Xr Knee 1 Or 2 View Right    Result Date: 12/26/2019  Narrative: RIGHT KNEE, 12/26/2019     HISTORY: 64-year-old female in the ED with right knee pain after a fall in bathroom this morning.  TECHNIQUE: AP and cross table lateral radiograph since the right knee.  FINDINGS: There is a very large knee joint effusion. No acute  Fracture or dislocation is visible.      Impression: 1. No acute osseous abnormality. 2. Very large knee joint effusion.  This report was finalized on 12/26/2019 11:07 AM by Dr. Forrest Brooks MD.      Ct Angiogram Chest With & Without Contrast    Result Date: 12/28/2019  Narrative: CT ANGIOGRAM CHEST W WO CONTRAST INDICATION: Pulmonary hypertension. Recent admission for femoral fracture on the right. Weakness and confusion. TECHNIQUE: CT angiogram of the chest with 100 cc of Isovue-300 IV contrast. 3-D reconstructions were obtained and reviewed.   Radiation dose reduction techniques included automated exposure control or exposure modulation based on body size. Count of known CT and cardiac nuc med studies performed in previous 12 months: 1. COMPARISON: None available. FINDINGS: Chest images at mediastinal window show no adenopathy or effusions. No pulmonary artery filling defects are seen to suggest emboli. The central pulmonary arteries are prominent consistent with pulmonary hypertension. The main pulmonary artery  measures 3.8 cm in diameter. Both the left and right atria are enlarged more extensively on the right. Coronary artery calcifications are seen in all 3 coronary distributions. There is a moderate sliding hiatal hernia. Reflux of contrast is noted into the hepatic veins consistent with elevated right heart pressures. Chest images at lung window show a thick walled bleb at the right lung base. Interstitial markings are generally prominent bilaterally.     Impression: There are findings of pulmonary hypertension as detailed above. No pulmonary emboli are noted. Three-vessel coronary artery disease is seen. The lungs show diffuse interstitial lung disease bilaterally possibly secondary to pulmonary hypertension. There is a thick walled bleb at the right lung base measuring 2.5 x 4 cm in transverse diameter. Signer Name: Ortiz Hernandez MD  Signed: 12/28/2019 1:32 PM  Workstation Name: [id number]  [facility]    Nm Lung Ventilation Perfusion    Result Date: 12/16/2019  Narrative: NM Pulm Vent And Perf HISTORY: 64-year-old female with shortness of breath for one day and swollen left leg. Elevated d-dimer. DOSE: *  28.4 mCi Tc99m DTPA. *  5.8 mCi Tc99m MAA. COMPARISON: Correlation is made to chest radiograph 12/16/2019. FINDINGS: Ventilation:  Patchy distribution of radiotracer and centralized clumping of the radiotracer indicates an obstructive lung disease. Perfusion:  There is uniform distribution of radiotracer throughout both lungs. No ventilation/perfusion mismatch to suggest a pulmonary embolus.     Impression: Low probability ventilation/perfusion scan for pulmonary embolus. Signer Name: Kike Matthews MD  Signed: 12/16/2019 7:30 PM  Workstation Name: [id number]  [facility]    Xr Chest 1 View    Result Date: 12/16/2019  Narrative: CR Chest 1 Vw INDICATION: Shortness of air with swelling in legs today COMPARISON:  Chest film 3/2/2015 FINDINGS: Single portable AP  view(s) of the chest.  There is stable cardiomegaly with tortuous aorta. There is mild pulmonary venous distention and mild interstitial prominence in both lungs diffusely fairly similar to or slightly improved from 3/2/2015.     Impression: Cardiomegaly with pulmonary venous distention and bilateral diffuse interstitial changes stable to improved over 3/2/2015. This could represent chronic change, however, recurrent of pulmonary edema could have this appearance. Note that the lungs did not demonstrate a diffuse interstitial pattern on prior chest film 6/12/2010 Signer Name: Trish Lubin MD  Signed: 12/16/2019 5:09 PM  Workstation Name: [email or web address]  [facility] Renal Bilateral    Result Date: 12/17/2019  Narrative: BILATERAL RENAL ULTRASOUND, 12/17/2019  HISTORY: Edema and shortness of air and elevated creatinine.  COMPARISON: None  TECHNIQUE: Grayscale ultrasound imaging of both kidneys and the urinary bladder was performed.  FINDINGS: There is a 7 mm cyst in the right kidney.. . Right Kidney is  10.6cms, Left kidney is 9.5cms.. The kidneys otherwise appear normal. The bladder is collapsed        Impression: 7 mm right renal cyst otherwise normal  This report was finalized on 12/17/2019 4:23 PM by Dr. King Mayers MD.      Ct Lower Extremity Right Without Contrast    Result Date: 12/26/2019  Narrative: CT RIGHT KNEE, 12/26/2019  HISTORY: 64-year-old female in the ED with right knee pain after a fall in the bathroom this morning. X-ray series showed no fracture but did show a large knee joint effusion.  TECHNIQUE:  Axial CT images through the right knee with multiplanar image reconstruction. Radiation dose reduction techniques included automated exposure control or exposure modulation based on body size. Radiation audit for CT and nuclear cardiology exams in the last 12 months: 0.  FINDINGS:  The examination shows a large hemarthrosis filling the joint capsule and distending the  suprapatellar bursa. No osseous intra-articular loose body.  There is a small, nondisplaced, subcortical fracture extending vertically across the medial margin of the medial femoral condyle. This corresponds to the location of the MCL attachment. No other knee fracture is demonstrated. No fracture of the tibial plateau. No displacement of the patella.  Mild degenerative arthropathy with greatest involvement of the lateral tibial plateau. Minimal articular chondrocalcinosis.      Impression: 1.  Nondisplaced vertical subcortical fracture along the medial margin of the medial femoral condyle. This corresponds to the MCL attachment. 2.  No additional acute knee fracture. 3.  Large hemarthrosis. 4.  Degenerative arthropathy with articular changes along the surface of the lateral tibial plateau.  This report was finalized on 12/26/2019 1:55 PM by Dr. Forrest Brooks MD.      Xr Chest Pa & Lateral    Result Date: 12/18/2019  Narrative: CHEST 2 VIEWS 12/18/2019  HISTORY: Acute onset of shortness of breath today, patient on supplemental oxygen. Chronic diastolic heart failure and atrial fibrillation. Benign essential hypertension.  FINDINGS: The heart is enlarged but stable compared with 12/16/2019. There are interstitial infiltrates seen diffusely throughout both lungs suggesting mild pulmonary edema. Poor inspiratory result with bibasilar atelectasis. There are no pleural effusions. No pneumothorax.      Impression: Cardiomegaly and mild pulmonary edema.  This report was finalized on 12/18/2019 3:45 PM by Dr. Jeff Moya MD.        Allergies:  is allergic to plavix [clopidogrel bisulfate] and keflex [cephalexin].    Robbi  reviewed    Discharge Medications:     Discharge Medications      Changes to Medications      Instructions Start Date   metoprolol succinate XL 25 MG 24 hr tablet  Commonly known as:  TOPROL-XL  What changed:    · medication strength  · how much to take   75 mg, Oral, Daily   Start Date:  January  11, 2020        Continue These Medications      Instructions Start Date   BRILINTA 90 MG tablet tablet  Generic drug:  ticagrelor   90 mg, Oral, 2 Times Daily      carBAMazepine 200 MG tablet  Commonly known as:  TEGretol   200 mg, Oral, 2 Times Daily      CRESTOR 40 MG tablet  Generic drug:  rosuvastatin   40 mg, Oral, Nightly      esomeprazole 40 MG capsule  Commonly known as:  nexIUM   40 mg, Oral, Every Morning Before Breakfast      furosemide 20 MG tablet  Commonly known as:  LASIX   60 mg a.m. and 20 mg p.m.      ipratropium-albuterol 0.5-2.5 mg/3 ml nebulizer  Commonly known as:  DUO-NEB   3 mL, Nebulization, 4 Times Daily - RT      miconazole 2 % cream  Commonly known as:  MICOTIN   Topical, Every 12 Hours Scheduled      nitroglycerin 0.4 MG SL tablet  Commonly known as:  NITROSTAT   PLEASE SEE ATTACHED FOR DETAILED DIRECTIONS      primidone 250 MG tablet  Commonly known as:  MYSOLINE   250 mg, Oral, Daily             Last Lab Results:   Lab Results (most recent)     Procedure Component Value Units Date/Time    Basic Metabolic Panel [[id number]]  (Abnormal) Collected:  01/10/20 0535    Specimen:  Blood Updated:  01/10/20 0602     Glucose 96 mg/dL      BUN 28 mg/dL      Creatinine 1.18 mg/dL      Sodium 129 mmol/L      Potassium 3.6 mmol/L      Chloride 91 mmol/L      CO2 24.7 mmol/L      Calcium 9.4 mg/dL      eGFR Non African Amer 46 mL/min/1.73      BUN/Creatinine Ratio 23.7     Anion Gap 13.3 mmol/L     Narrative:       GFR Normal >60  Chronic Kidney Disease <60  Kidney Failure <15      CBC & Differential [[id number]] Collected:  01/10/20 0535    Specimen:  Blood Updated:  01/10/20 0547    Narrative:       The following orders were created for panel order CBC & Differential.  Procedure                               Abnormality         Status                     ---------                               -----------         ------                     CBC Auto Differential[[id number]]        Abnormal             Final result                 Please view results for these tests on the individual orders.    CBC Auto Differential [[id number]]  (Abnormal) Collected:  01/10/20 0535    Specimen:  Blood Updated:  01/10/20 0547     WBC 7.33 10*3/mm3      RBC 3.95 10*6/mm3      Hemoglobin 9.7 g/dL      Hematocrit 31.6 %      MCV 80.0 fL      MCH 24.6 pg      MCHC 30.7 g/dL      RDW 19.1 %      RDW-SD 55.8 fl      MPV 9.9 fL      Platelets 334 10*3/mm3      Neutrophil % 73.1 %      Lymphocyte % 13.0 %      Monocyte % 8.5 %      Eosinophil % 4.0 %      Basophil % 1.0 %      Immature Grans % 0.4 %      Neutrophils, Absolute 5.37 10*3/mm3      Lymphocytes, Absolute 0.95 10*3/mm3      Monocytes, Absolute 0.62 10*3/mm3      Eosinophils, Absolute 0.29 10*3/mm3      Basophils, Absolute 0.07 10*3/mm3      Immature Grans, Absolute 0.03 10*3/mm3      nRBC 0.0 /100 WBC         Imaging Results (Most Recent)     None          PROCEDURES      Condition on Discharge:  Stable    Physical Exam at Discharge  Vital Signs  Temp:  [97.6 °F (36.4 °C)-98.5 °F (36.9 °C)] 97.6 °F (36.4 °C)  Heart Rate:  [] 85  Resp:  [18] 18  BP: (108-129)/(56-80) 108/56    Physical Exam:  Physical Exam   Constitutional: Patient appears much older than stated age and in no acute distress   Cardiovascular: Regular rate, regular rhythm, S1 normal and S2 normal.  Exam reveals no gallop and no friction rub.  No murmur heard.  Pulmonary/Chest: Lungs are diminished to auscultation bilaterally. No respiratory distress. No wheezes. No rhonchi. No rales.   Abdominal: Soft. Bowel sounds are normal. There is no tenderness.   Extremities: No asymmetric edema.   Neurological: Skin: Skin is warm. No rash noted. Nails show no clubbing.  No cyanosis or erythema.    Discharge Disposition  Home w/ Home Health    Visiting Nurse:    No     Home PT/OT:  No     Home Safety Evaluation:  No     DME  Rolling Walker    Discharge Diet:      Dietary Orders (From admission, onward)     Start      Ordered    01/03/20 1439  Diet Regular  Diet Effective Now     Comments:  Pt ok to have pepsi or diet pepsi per verbal from Dr Crawford   Question:  Diet Texture / Consistency  Answer:  Regular    01/03/20 1438    01/03/20 1439  DIET MESSAGE Patient does not want gravy sent with meals and does not like oatmeal.  Please see for preferences for each meal.  Send yogurt with all meals.  Once     Comments:  Patient does not want gravy sent with meals and does not like oatmeal.  Please see for preferences for each meal.  Send yogurt with all meals.    01/03/20 1438                Activity at Discharge:  As tolerated      Follow-up Appointments  Future Appointments   Date Time Provider Department Center   1/15/2020  1:15 PM Agatha Hatfield APRN [facility] None   1/21/2020  2:15 PM Toi Reynoso MD [facility] None   5/12/2020  2:00 PM Lucas Damon MD [facility] OB TC LG None     Additional Instructions for the Follow-ups that You Need to Schedule     Discharge Follow-up with PCP   As directed       Currently Documented PCP:    Becca Unger MD    PCP Phone Number:    [phone]     Follow Up Details:  As scheduled         Discharge Follow-up with Specified Provider: Dr. Musa as scheduled   As directed      To:  Dr. Musa as scheduled         Discharge Follow-up with Specified Provider: Dr. Zapata as scheduled   As directed      To:  Dr. Zapata as scheduled         Discharge Follow-up with Specified Provider: Dr. Reynoso as scheduled   As directed      To:  Dr. Reynoso as scheduled               Test Results Pending at Discharge  None     Anival Crawford MD  01/10/20  3:43 PM

## 2020-01-10 NOTE — PROGRESS NOTES
To be discharged home today where she lives with her spouse. [facility] will be following for PT, OT, and nursing. Follow up with PCP,  on January 17, 2020 at 1:20PM. Appointment with orthopedic surgeon,  on January 21, 2020 at 1:30PM. Follow up with pulmonary,  on January 13, 2020 at 1:20PM. Awaiting return call to schedule appointment with , cardiology. Wears O2 at 2 liters per nasal canula. [employer] supplies her oxygen in which she already has at home. [facility] has also provided her with a rolling walker. She has a rollator, bedside commode, shower chair, nebulizer and a glucometer. She fills her prescriptions at [facility] in [place], [place]. No other DME needed. (1330) Received a call back from [facility] office. Appointment with Agatha STREETER at the cardiology office in [place] on January 15, 2020 at 1:15PM.

## 2020-01-15 ENCOUNTER — OFFICE VISIT (OUTPATIENT)
Dept: CARDIOLOGY | Facility: CLINIC | Age: 65
End: 2020-01-15

## 2020-01-15 VITALS
SYSTOLIC BLOOD PRESSURE: 110 MMHG | BODY MASS INDEX: 27.15 KG/M2 | HEART RATE: 63 BPM | RESPIRATION RATE: 16 BRPM | DIASTOLIC BLOOD PRESSURE: 70 MMHG | WEIGHT: 173 LBS | HEIGHT: 67 IN

## 2020-01-15 DIAGNOSIS — I50.812 CHRONIC RIGHT-SIDED HEART FAILURE (HCC): ICD-10-CM

## 2020-01-15 DIAGNOSIS — I27.20 PULMONARY HYPERTENSION (HCC): Chronic | ICD-10-CM

## 2020-01-15 DIAGNOSIS — I25.10 CORONARY ARTERY DISEASE INVOLVING NATIVE CORONARY ARTERY OF NATIVE HEART WITHOUT ANGINA PECTORIS: Chronic | ICD-10-CM

## 2020-01-15 DIAGNOSIS — I10 ESSENTIAL HYPERTENSION: ICD-10-CM

## 2020-01-15 DIAGNOSIS — I48.21 PERMANENT ATRIAL FIBRILLATION (HCC): Primary | Chronic | ICD-10-CM

## 2020-01-15 PROCEDURE — 93000 ELECTROCARDIOGRAM COMPLETE: CPT | Performed by: NURSE PRACTITIONER

## 2020-01-15 PROCEDURE — 99214 OFFICE O/P EST MOD 30 MIN: CPT | Performed by: NURSE PRACTITIONER

## 2020-01-15 NOTE — PROGRESS NOTES
Date of Office Visit: 01/15/2020  Encounter Provider: FERDINAND Ramos  Place of Service: [facility] CARDIOLOGY  Patient Name: Destiny Catherine  :1955  Primary Cardiologist: Dr. Musa    CC:  Hospital follow up    Dear Dr. Unger    HPI: Destiny Catherine is a pleasant 64 y.o. female who presents 01/15/2020 for cardiac follow up.  She is a new patient to me and I reviewed her past medical records.  She has a patient of Dr. Musa.     She presented to [facility] emergency department 3/2018 after having a syncopal episode.  She was hypoxic in the ED and was transferred to [facility].  and was diagnosed with acute on chronic diastolic heart failure with severe pulmonary hypertension.  She diuresed successfully and was placed on home oxygen.  She was also diagnosed with a urinary tract infection and started on antibiotic therapy.  A 2-D echocardiogram was obtained which showed the following: EF 57%, RVSP 73 mmHg, aortic valve calcification, mild tricuspid valve regurgitation, left atrium moderately dilated, and moderately reduced right ventricular systolic function.     She has a history of CAD and had a myocardial infarction in . She had a catheter-based intervention at that time. That was before she was seen by Dr. Rausch and apparently he was never able to get the records. However, she states that since then she has not had any problems. She has not had any complaints of chest pain, pressure, tightness, squeezing, or heartburn. She has not been hospitalized or her heart for any reason. She has had 2 strokes in the past. She had a stroke in  with distal beading on an angiogram at [facility] of the distal MCA and YOEL, then she had a cerebellar CVA in 2010. Apparently on one of these to hospitalization she was started on Aggrenox and she has been on it ever since until it was just recently stopped.. She had a cardiac evaluation at [facility]  including a transesophageal echocardiogram that was normal. Normal LV systolic function and no cardiac source of emboli seen. Valvular function was normal.    She presented to [facility] on 12/16/2019 with increasing leg swelling and chronic dyspnea.  She was hypoxic upon arrival.  Her proBNP was greater than 10,000 upon arrival.  She did receive 80 mg of IV Lasix and had 1.7 L of urine output overnight.  She felt much better after that.    She had an echo performed 12/17/2019 that revealed left ventricular systolic function is hyperdynamic with an EF of greater than 70%.  Calculated EF was 80%.  Estimated EF appears to be in the range of greater than 70%.  All left ventricular wall segments contract normally.  The left ventricular cavity is small.  Left ventricular wall thickness is consistent with mild to moderate concentric hypertrophy.  Left ventricular diastolic function was indeterminate.  Right ventricle not well visualized.  Right ventricle cavity is dilated.  Mild to moderate tricuspid valve regurgitation is present.  Calculated right ventricular systolic pressure from tricuspid regurgitation is 89.4 mmHg.  Severe pulmonary hypertension is present.    She was able to be discharged home on 12/18/2019.  However she arrived via EMS on 12/26/2019 for evaluation of weakness with multiple falls and a knee injury.  She had fallen twice throughout the night at home.  The first time EMS was called they helped her back up but she did not want to be evaluated.  The second fall was early in the morning when she was in the bathroom and she fell and injured her right knee.  She was unable to get back up or to ambulate.  She reported generalized weakness and leg pain.  CT of the knee indicated a femoral condyle fracture and a large amount of joint effusion.  Orthopedic surgery was consulted and they wanted to treat her conservatively with a long leg hinged knee brace for stabilization.  Patient was weightbearing and  "was then discharged on 1/3/2020 2 rehab at [facility].    Patient did not participate to her optimum level and was discharged back home with home health, PT, OT on 1/10/2020.    She presents today with her  and daughter.  She is quite specific with her verbiage.  She denies any palpitations, chest pain or chest tightness.  She denies any dizziness or lightheadedness.  She denies any edema.  She does get short of breath with minimal activity.  She is using chronic oxygen at 3 L.  She cannot feel her heart racing or skipping.  She states she is taking her medications as directed.  Family states she has already had a follow-up with pulmonology.  She sees you this week and orthopedics next week.  She states she feels \"great\".  Family reports that she does not get up and do much around the house.  She does have her leg brace on her right leg.  That leg is somewhat swollen.  She denies that it is painful.      Past Medical History:   Diagnosis Date   • Anemia    • Chronic diastolic heart failure (CMS/HCC) 4/27/2017   • Colon polyp    • COPD (chronic obstructive pulmonary disease) (CMS/HCC)    • Coronary artery disease involving native coronary artery of native heart without angina pectoris 4/27/2017   • Epilepsy (CMS/HCC)    • History of abnormal cervical Pap smear 4/30/2019   • History of renal stent 4/27/2017   • HPV in female 5/6/2019   • Hyperlipidemia    • Hypertension    • Malignant neoplasm of anus (CMS/HCC) 4/11/2018   • Nonruptured cerebral aneurysm    • Permanent atrial fibrillation    • Pulmonary hypertension (CMS/HCC)    • Stroke (CMS/HCC)     2009 and January 2010- [facility]   • Tobacco abuse    • UTI (urinary tract infection)        Past Surgical History:   Procedure Laterality Date   • APPENDECTOMY     • CEREBRAL ANGIOGRAM      stent placement at [facility]   • CHOLECYSTECTOMY     • COLONOSCOPY N/A 5/8/2019    Procedure: COLONOSCOPY; POLYPECTOMY;  Surgeon: Deborah Cedeno MD;  Location: [facility] OR;  " Service: Gastroenterology   • CORONARY STENT PLACEMENT     • TOTAL HIP ARTHROPLASTY         Social History     Socioeconomic History   • Marital status:      Spouse name: Not on file   • Number of children: Not on file   • Years of education: Not on file   • Highest education level: Not on file   Tobacco Use   • Smoking status: Former Smoker     Types: Cigarettes   • Smokeless tobacco: Never Used   • Tobacco comment: caffine use   Substance and Sexual Activity   • Alcohol use: No   • Drug use: No   • Sexual activity: Not Currently       Family History   Problem Relation Age of Onset   • Hypertension Mother    • Lung cancer Mother    • Heart disease Father    • Stroke Father    • Colon cancer Neg Hx    • Colon polyps Neg Hx        The following portion of the patient's history were reviewed and updated as appropriate: past medical history, past surgical history, past social history, past family history, allergies, current medications, and problem list.    Review of Systems   Constitution: Positive for malaise/fatigue. Negative for diaphoresis and fever.   HENT: Negative for congestion, hearing loss, hoarse voice, nosebleeds and sore throat.    Eyes: Negative for photophobia, vision loss in left eye, vision loss in right eye and visual disturbance.   Cardiovascular: Positive for leg swelling. Negative for chest pain, dyspnea on exertion, irregular heartbeat, near-syncope, orthopnea, palpitations, paroxysmal nocturnal dyspnea and syncope.   Respiratory: Positive for shortness of breath (with minimal activity). Negative for cough, hemoptysis, sleep disturbances due to breathing, snoring, sputum production and wheezing.    Endocrine: Negative for cold intolerance, heat intolerance, polydipsia, polyphagia and polyuria.   Hematologic/Lymphatic: Negative for bleeding problem. Does not bruise/bleed easily.   Skin: Negative for color change, dry skin, poor wound healing, rash and suspicious lesions.   Musculoskeletal:  Positive for joint pain (right kne, in leg brace). Negative for arthritis, back pain, falls, gout, joint swelling, muscle cramps, muscle weakness and myalgias.   Gastrointestinal: Negative for bloating, abdominal pain, constipation, diarrhea, dysphagia, melena, nausea and vomiting.   Neurological: Negative for excessive daytime sleepiness, dizziness, headaches, light-headedness, loss of balance, numbness, paresthesias, seizures, vertigo and weakness.   Psychiatric/Behavioral: Negative for depression, memory loss and substance abuse. The patient is not nervous/anxious.         Aggressive at times in her speech       Allergies   Allergen Reactions   • Plavix [Clopidogrel Bisulfate] Other (See Comments)     bruises   • Keflex [Cephalexin] Rash         Current Outpatient Medications:   •  carBAMazepine (TEGretol) 200 MG tablet, Take 200 mg by mouth 2 (Two) Times a Day., Disp: , Rfl:   •  esomeprazole (nexIUM) 40 MG capsule, Take 40 mg by mouth Every Morning Before Breakfast., Disp: , Rfl:   •  furosemide (LASIX) 20 MG tablet, 60 mg a.m. and 20 mg p.m. (Patient taking differently: Take 20 mg by mouth 2 (Two) Times a Day. 60 mg a.m. and 20 mg p.m.), Disp: 120 tablet, Rfl: 1  •  ipratropium-albuterol (DUO-NEB) 0.5-2.5 mg/3 ml nebulizer, Take 3 mL by nebulization 4 (Four) Times a Day., Disp: 360 mL, Rfl: 1  •  metoprolol succinate XL (TOPROL-XL) 25 MG 24 hr tablet, Take 3 tablets by mouth Daily., Disp: 90 tablet, Rfl: 0  •  miconazole (MICOTIN) 2 % cream, Apply  topically to the appropriate area as directed Every 12 (Twelve) Hours., Disp: 92 g, Rfl: 1  •  nitroglycerin (NITROSTAT) 0.4 MG SL tablet, PLEASE SEE ATTACHED FOR DETAILED DIRECTIONS, Disp: , Rfl: 1  •  primidone (MYSOLINE) 250 MG tablet, Take 250 mg by mouth Daily., Disp: , Rfl:   •  rosuvastatin (CRESTOR) 40 MG tablet, Take 40 mg by mouth Every Night., Disp: , Rfl:   •  ticagrelor (BRILINTA) 90 MG tablet tablet, Take 90 mg by mouth 2 (Two) Times a Day., Disp: ,  "Rfl:         Objective:     Vitals:    01/15/20 1347   BP: 110/70   BP Location: Right arm   Patient Position: Sitting   Cuff Size: Adult   Pulse: 63   Resp: 16   Weight: 78.5 kg (173 lb)   Height: 170.2 cm (67\")     Body mass index is 27.1 kg/m².      Physical Exam   Constitutional: She is oriented to person, place, and time. She appears well-developed and well-nourished. No distress.   HENT:   Head: Normocephalic and atraumatic.   Right Ear: External ear normal.   Left Ear: External ear normal.   Nose: Nose normal.   Eyes: Pupils are equal, round, and reactive to light. Conjunctivae are normal. Right eye exhibits no discharge. Left eye exhibits no discharge.   Neck: Normal range of motion. Neck supple. No JVD present. No tracheal deviation present. No thyromegaly present.   Cardiovascular: Normal rate, normal heart sounds and intact distal pulses. An irregular rhythm present. Exam reveals no gallop and no friction rub.   No murmur heard.  Pulmonary/Chest: Effort normal and breath sounds normal. No respiratory distress. She has no wheezes. She has no rales. She exhibits no tenderness.   Abdominal: Soft. Bowel sounds are normal. She exhibits no distension. There is no tenderness.   Musculoskeletal: She exhibits edema (right LE). She exhibits no tenderness or deformity.   Leg brace to right leg   Lymphadenopathy:     She has no cervical adenopathy.   Neurological: She is alert and oriented to person, place, and time. Coordination normal.   Skin: Skin is warm and dry. No rash noted. No erythema.   Psychiatric: Her speech is normal. Thought content normal. Her affect is blunt. She is agitated. Cognition and memory are impaired. She expresses impulsivity.             ECG 12 Lead  Date/Time: 1/15/2020 1:51 PM  Performed by: Agatha Hatfield APRN  Authorized by: Agatha Hatfield APRN   Comparison: compared with previous ECG from 12/26/2019  Similar to previous ECG  Rhythm: atrial fibrillation  Rate: normal  Conduction: " "conduction normal  ST Segments: ST segments normal  T inversion: V2, V3 and V4  T flattening: III  QRS axis: left    Clinical impression: abnormal EKG              Assessment:       Diagnosis Plan   1. Permanent atrial fibrillation     2. Coronary artery disease involving native coronary artery of native heart without angina pectoris     3. Chronic right-sided heart failure (CMS/HCC)     4. Essential hypertension     5. Pulmonary hypertension (CMS/HCC)            Plan:       1.  Permanent atrial fibrillation-she remains in A. fib, rate controlled, she is on a beta-blocker.  She is not on anticoagulation due to her risk of bleeding  And frequent falls. .  CHADS Vasc score is 6.  Continue beta-blockade for rate control.      2.  CAD-She has a history of CAD and had a myocardial infarction in 2000. She had a catheter-based intervention at that time.  Denies angina    3.  Chronic right-sided heart failure-secondary to the pulmonary hypertension.  Left ventricular systolic function is normal    4.  Essential hypertension-well-controlled    5.  Pulmonary hypertension- followed by pulmonary.  Per  they saw pulmonologist this week and has \"several test lined up soon\".    6.  Mixed hyperlipidemia-continue lipid-lowering therapy.    7.  History of CVA, currently on Brilinta.   She is allergic to clopidogrel.     8.  Status post mechanical fall with fracture offemoral condyle -it was decided to treat her conservatively.  She was placed in a long leg hinged knee brace for stabilization.  She was on TCU for rehabilitation, but she did not fully participate.  She currently has PT OT and nursing coming to her home.  She follows up with Dr. Reynoso next week.    As always, it has been a pleasure to participate in your patient's care. Thank you.     RTO in 2 to 3 months with KATIA    Sincerely,       FERDINAND Ramos      Current Outpatient Medications:   •  carBAMazepine (TEGretol) 200 MG tablet, Take 200 mg by mouth 2 (Two) " Times a Day., Disp: , Rfl:   •  esomeprazole (nexIUM) 40 MG capsule, Take 40 mg by mouth Every Morning Before Breakfast., Disp: , Rfl:   •  furosemide (LASIX) 20 MG tablet, 60 mg a.m. and 20 mg p.m. (Patient taking differently: Take 20 mg by mouth 2 (Two) Times a Day. 60 mg a.m. and 20 mg p.m.), Disp: 120 tablet, Rfl: 1  •  ipratropium-albuterol (DUO-NEB) 0.5-2.5 mg/3 ml nebulizer, Take 3 mL by nebulization 4 (Four) Times a Day., Disp: 360 mL, Rfl: 1  •  metoprolol succinate XL (TOPROL-XL) 25 MG 24 hr tablet, Take 3 tablets by mouth Daily., Disp: 90 tablet, Rfl: 0  •  miconazole (MICOTIN) 2 % cream, Apply  topically to the appropriate area as directed Every 12 (Twelve) Hours., Disp: 92 g, Rfl: 1  •  nitroglycerin (NITROSTAT) 0.4 MG SL tablet, PLEASE SEE ATTACHED FOR DETAILED DIRECTIONS, Disp: , Rfl: 1  •  primidone (MYSOLINE) 250 MG tablet, Take 250 mg by mouth Daily., Disp: , Rfl:   •  rosuvastatin (CRESTOR) 40 MG tablet, Take 40 mg by mouth Every Night., Disp: , Rfl:   •  ticagrelor (BRILINTA) 90 MG tablet tablet, Take 90 mg by mouth 2 (Two) Times a Day., Disp: , Rfl:     Dictated utilizing Dragon dictation

## 2020-01-20 ENCOUNTER — TRANSCRIBE ORDERS (OUTPATIENT)
Dept: ADMINISTRATIVE | Facility: HOSPITAL | Age: 65
End: 2020-01-20

## 2020-01-20 DIAGNOSIS — J84.9 ILD (INTERSTITIAL LUNG DISEASE) (HCC): Primary | ICD-10-CM

## 2020-01-20 DIAGNOSIS — J44.9 CHRONIC OBSTRUCTIVE PULMONARY DISEASE, UNSPECIFIED COPD TYPE (HCC): ICD-10-CM

## 2020-01-21 ENCOUNTER — OFFICE VISIT (OUTPATIENT)
Dept: ORTHOPEDIC SURGERY | Facility: CLINIC | Age: 65
End: 2020-01-21

## 2020-01-21 DIAGNOSIS — S72.434D: ICD-10-CM

## 2020-01-21 DIAGNOSIS — R52 PAIN: Primary | ICD-10-CM

## 2020-01-21 PROCEDURE — 73560 X-RAY EXAM OF KNEE 1 OR 2: CPT | Performed by: ORTHOPAEDIC SURGERY

## 2020-01-21 PROCEDURE — 99024 POSTOP FOLLOW-UP VISIT: CPT | Performed by: ORTHOPAEDIC SURGERY

## 2020-01-21 NOTE — PROGRESS NOTES
CC: Follow-up closed treatment right medial condyle fracture DOI:[id number]    Interval History: Patient returns to clinic today stating she is doing well. The pain in her knee has improved. She has minimal residual pain in the medial aspect of her knee. She states the brace she was given is very uncomfortable and she cannot sleep well in it. She denies any associated numbness and tingling.    Exam:  Right Knee-    ROM 3-105 degrees  No pain on varus or valgus stress  Grade 1 opening on valgus stress at 30 degrees  No opening at full extension  Minimal tenderness to medial femoral condyle  No extensor lag on straight leg raise  Positive sensation light tough all distributions symmetric to contralateral side  Brisk cap refill all digits  2+ dorsalis pedis pulse      Imagin view x-rays of the right knee, AP and lateral views, reviewed by me, closed treatment medial condyle probable fracture right distal femur.  Compared to prior x-rays from hospital.  Fracture is stable position in comparison to prior films.  Great alignment on AP and lateral views    Impression: Follow-up closed treatment right medial condyle fracture     Plan:  1. Patient had all questions answered today and was in agreement with treatment plan.  2. Continue working with home-health therapy.   3. Patient given drytex brace today, she may remove the brace when bathing.   4. Advised patient to ambulate using a walker.    5. Follow-up in 4 weeks with repeat xray of the right knee.    By signing my name here, I Kalyani Drake, attest that all documentation on 20 at 4:12 PM has been prepared under the direction and in the presence of Dr. Toi Reynoso.    I, Dr. Toi Reynoso, personally performed the services described in this documentation, as scribed by Kalyani Drake, in my presence, and it is both accurate and complete.

## 2020-01-23 PROBLEM — S72.434D: Status: ACTIVE | Noted: 2020-01-23

## 2020-02-12 ENCOUNTER — HOSPITAL ENCOUNTER (OUTPATIENT)
Dept: PULMONOLOGY | Facility: HOSPITAL | Age: 65
Discharge: HOME | End: 2020-02-12
Admitting: INTERNAL MEDICINE

## 2020-02-12 ENCOUNTER — HOSPITAL ENCOUNTER (OUTPATIENT)
Dept: CT IMAGING | Facility: HOSPITAL | Age: 65
Discharge: HOME | End: 2020-02-12

## 2020-02-12 VITALS — HEART RATE: 87 BPM | OXYGEN SATURATION: 93 % | RESPIRATION RATE: 20 BRPM

## 2020-02-12 DIAGNOSIS — J84.9 ILD (INTERSTITIAL LUNG DISEASE) (HCC): ICD-10-CM

## 2020-02-12 DIAGNOSIS — J44.9 CHRONIC OBSTRUCTIVE PULMONARY DISEASE, UNSPECIFIED COPD TYPE (HCC): ICD-10-CM

## 2020-02-12 PROCEDURE — 94726 PLETHYSMOGRAPHY LUNG VOLUMES: CPT

## 2020-02-12 PROCEDURE — 71250 CT THORAX DX C-: CPT

## 2020-02-12 PROCEDURE — 94060 EVALUATION OF WHEEZING: CPT

## 2020-02-12 PROCEDURE — 94729 DIFFUSING CAPACITY: CPT

## 2020-02-12 RX ORDER — ALBUTEROL SULFATE 2.5 MG/3ML
2.5 SOLUTION RESPIRATORY (INHALATION) ONCE
Status: DISCONTINUED | OUTPATIENT
Start: 2020-02-12 | End: 2020-02-13 | Stop reason: HOSPADM

## 2020-02-18 ENCOUNTER — OFFICE VISIT (OUTPATIENT)
Dept: ORTHOPEDIC SURGERY | Facility: CLINIC | Age: 65
End: 2020-02-18

## 2020-02-18 VITALS — WEIGHT: 159 LBS | HEIGHT: 63 IN | BODY MASS INDEX: 28.17 KG/M2

## 2020-02-18 DIAGNOSIS — S72.434D: Primary | ICD-10-CM

## 2020-02-18 PROCEDURE — 99024 POSTOP FOLLOW-UP VISIT: CPT | Performed by: ORTHOPAEDIC SURGERY

## 2020-02-18 PROCEDURE — 73560 X-RAY EXAM OF KNEE 1 OR 2: CPT | Performed by: ORTHOPAEDIC SURGERY

## 2020-02-18 NOTE — PROGRESS NOTES
CC: Follow-up closed treatment right medial condyle fracture DOI:[id number]    Interval History: Patient returns to clinic today stating she is doing well. She denies any residual pain at this time. She has been wearing her brace as instructed.     Exam:  Right Knee-    ROM 3-120 degrees  4+/5 on flexion  4+/5 on extension  No opening on varus and valgus stress at 3 and 30  Positive sensation light tough all distributions symmetric to contralateral side  Brisk cap refill all digits  2+ dorsalis pedis pulse    Imagin view x-rays of the right knee, AP and lateral views, reviewed by me, closed treatment medial condyle probable fracture right distal femur.  Compared to prior x-rays from office.  Fracture is stable position in comparison to prior films with mild callus formation and obliteration of radiolucent line noted along medial condyle of femur.  Acceptable alignment on AP and lateral views    Impression: Follow-up closed treatment right medial condyle fracture     Plan:  1. Patient had all questions answered today and was in agreement with treatment plan.  2. Patient may discontinue knee brace at this time. She may wear a compression sleeve for additional support.  3. Follow-up as needed.    By signing my name here, I Kalyani Drake, attest that all documentation on 20 at 9:39 AM has been prepared under the direction and in the presence of Dr. Toi Reynoso.    I, Dr. Toi Reynoso, personally performed the services described in this documentation, as scribed by Kalyani Drake, in my presence, and it is both accurate and complete.

## 2020-03-13 DIAGNOSIS — I50.32 CHRONIC DIASTOLIC HEART FAILURE (HCC): Chronic | ICD-10-CM

## 2020-03-13 DIAGNOSIS — J96.21 ACUTE ON CHRONIC RESPIRATORY FAILURE WITH HYPOXIA (HCC): ICD-10-CM

## 2020-03-13 DIAGNOSIS — I27.20 PULMONARY HYPERTENSION (HCC): Primary | Chronic | ICD-10-CM

## 2020-03-17 ENCOUNTER — TELEPHONE (OUTPATIENT)
Dept: CARDIOLOGY | Facility: CLINIC | Age: 65
End: 2020-03-17

## 2020-03-17 ENCOUNTER — TRANSCRIBE ORDERS (OUTPATIENT)
Dept: CARDIOLOGY | Facility: CLINIC | Age: 65
End: 2020-03-17

## 2020-03-17 DIAGNOSIS — Z13.6 SCREENING FOR ISCHEMIC HEART DISEASE: ICD-10-CM

## 2020-03-17 DIAGNOSIS — Z01.810 PRE-OPERATIVE CARDIOVASCULAR EXAMINATION: Primary | ICD-10-CM

## 2020-03-17 NOTE — TELEPHONE ENCOUNTER
I spoke to the pt regarding her appointment. She was not having any symptoms or concern at this time for . She agree to postpone her appointment for 3-6 month. She is aware that we will place her on a recall list and call her to schedule her an appointment in the future.    Thanks Paulina

## 2020-03-18 ENCOUNTER — TELEPHONE (OUTPATIENT)
Dept: CARDIOLOGY | Facility: CLINIC | Age: 65
End: 2020-03-18

## 2020-03-18 NOTE — TELEPHONE ENCOUNTER
"Dr. Musa,   Due to the new guidelines for scheduling \"elective\" procedures at this time, I need to know if this needs to be scheduled within the next week or two, or can it wait until 3-4 weeks out?  trm   "

## 2020-03-18 NOTE — TELEPHONE ENCOUNTER
----- Message from FERDINAND Sharif sent at 3/18/2020  2:54 PM EDT -----  Regarding: FW: Plz consider pt for a Lt and Rt heart cath   This is the original note.  ----- Message -----  From: Floyd Musa III, MD  Sent: 3/12/2020   9:25 AM EDT  To: Smith Person MD, FERDINAND Sharif  Subject: RE: Plz consider pt for a Lt and Rt heart ca#    Thanks, will do.    Efraín- you just recently saw this patient, could you please get them set up for a Right and Left Heart cath with vasodilatory challenge?    Thanks-  Floyd    ----- Message -----  From: Smith Person MD  Sent: 3/9/2020   2:18 PM EDT  To: Floyd Musa III, MD  Subject: Plz consider pt for a Lt and Rt heart cath       Hey      This pt is getting evaluated by us for severe PH and chronic hypoxia   She has COPD, Restrictive lung dz and mild ILD, Undiagnosed JUANA.   She was dced on lasix and doing better and seems euvoelmic but had h/o CAD and s/p prior PCI - Can we consider her for a Lt and Rt heart cath if u agree plz. It will help in managing her better. She will need a vasodilation challenge if PAH is confirmed. This will likely be a WHO group 2 < 3 situation I feel. Plz feel free to call @ [phone] if u would like to discuss further.     Smith

## 2020-03-18 NOTE — TELEPHONE ENCOUNTER
----- Message from FERDINAND Sharif sent at 3/18/2020  1:22 PM EDT -----  This was ordered at the request of pulmonary.  They sent a note to Dr. Musa who asked me to schedule because I saw her last.  They are trying to determine if her SOA/dyspnea is more cardiac or more lung.  SOOOO?  ----- Message -----  From: Mildred Lal  Sent: 3/18/2020  10:31 AM EDT  To: FERDINAND Sharif    Efraín,   How soon does this cath need to be scheduled? Soon as possible, or can it wait until after 4/1?  trm

## 2020-03-22 ENCOUNTER — TELEPHONE (OUTPATIENT)
Dept: CARDIOLOGY | Facility: CLINIC | Age: 65
End: 2020-03-22

## 2020-04-01 ENCOUNTER — TELEPHONE (OUTPATIENT)
Dept: CARDIOLOGY | Facility: CLINIC | Age: 65
End: 2020-04-01

## 2020-04-01 ENCOUNTER — APPOINTMENT (OUTPATIENT)
Dept: SLEEP MEDICINE | Facility: HOSPITAL | Age: 65
End: 2020-04-01

## 2020-04-01 NOTE — TELEPHONE ENCOUNTER
LM on pt vm to call back to schedule his 1 year appointment. Telephone and video chat the week of 4/6-4/17.    Thanks Paulina

## 2021-11-29 NOTE — PLAN OF CARE
Problem: Patient Care Overview  Goal: Plan of Care Review  Flowsheets (Taken 1/8/2020 0825)  Plan of Care Reviewed With: patient  Outcome Summary: PT: Patient performs supine to sit transfer with CGA, sit to/from stand transfers with SBA/CGA and gait x 18 feet with 2 seated breaks with CGA/SBA with use of FWW. Patient continues to require encouragement to participate and progress activity. Plan to discharge patient from PT on 1/9. Patient denies concern for return home. Recommend FWW for mobility as well as 24/7 care and home health.       5

## (undated) DEVICE — VIAL FORMALIN CAP 10P 40ML

## (undated) DEVICE — Device

## (undated) DEVICE — SYR LL 3CC

## (undated) DEVICE — JACKT LAB KNIT COLR LG BLU

## (undated) DEVICE — SPNG GZ WOVN 4X4IN 12PLY 10/BX STRL

## (undated) DEVICE — MASK,FACE,FLUID RESIST,SHLD,EARLOOP: Brand: MEDLINE

## (undated) DEVICE — HYBRID TUBING/CAP SET FOR OLYMPUS® SCOPES: Brand: ERBE

## (undated) DEVICE — GOWN ISOL W/THUMB UNIV BLU BX/15

## (undated) DEVICE — FRCP BX RADJAW4 NDL 2.8 240CM LG OG BX40

## (undated) DEVICE — Device: Brand: DEFENDO AIR/WATER/SUCTION AND BIOPSY VALVE

## (undated) DEVICE — SUCTION CANISTER, 3000CC,SAFELINER: Brand: DEROYAL

## (undated) DEVICE — GLV SURG SENSICARE MICRO PF LF 6 STRL

## (undated) DEVICE — ENDO. PORT CONNECTOR W/VALVE FOR OLYMPUS® SCOPES: Brand: ERBE

## (undated) DEVICE — BW-412T DISP COMBO CLEANING BRUSH: Brand: SINGLE USE COMBINATION CLEANING BRUSH

## (undated) DEVICE — TRAP POLYP 4CHAMBER